# Patient Record
Sex: FEMALE | Race: WHITE | NOT HISPANIC OR LATINO | Employment: OTHER | URBAN - METROPOLITAN AREA
[De-identification: names, ages, dates, MRNs, and addresses within clinical notes are randomized per-mention and may not be internally consistent; named-entity substitution may affect disease eponyms.]

---

## 2017-12-21 ENCOUNTER — CONVERSION ENCOUNTER (OUTPATIENT)
Dept: GENERAL RADIOLOGY | Facility: HOSPITAL | Age: 72
End: 2017-12-21

## 2018-04-16 ENCOUNTER — OFFICE VISIT CONVERTED (OUTPATIENT)
Dept: FAMILY MEDICINE CLINIC | Facility: CLINIC | Age: 73
End: 2018-04-16
Attending: FAMILY MEDICINE

## 2018-07-10 ENCOUNTER — OFFICE VISIT CONVERTED (OUTPATIENT)
Dept: FAMILY MEDICINE CLINIC | Facility: CLINIC | Age: 73
End: 2018-07-10
Attending: FAMILY MEDICINE

## 2018-10-16 ENCOUNTER — CONVERSION ENCOUNTER (OUTPATIENT)
Dept: OTHER | Facility: HOSPITAL | Age: 73
End: 2018-10-16

## 2018-10-16 ENCOUNTER — OFFICE VISIT CONVERTED (OUTPATIENT)
Dept: FAMILY MEDICINE CLINIC | Facility: CLINIC | Age: 73
End: 2018-10-16
Attending: FAMILY MEDICINE

## 2019-01-02 ENCOUNTER — OFFICE VISIT CONVERTED (OUTPATIENT)
Dept: FAMILY MEDICINE CLINIC | Facility: CLINIC | Age: 74
End: 2019-01-02
Attending: NURSE PRACTITIONER

## 2019-01-14 ENCOUNTER — HOSPITAL ENCOUNTER (OUTPATIENT)
Dept: URGENT CARE | Facility: CLINIC | Age: 74
Discharge: HOME | End: 2019-01-14

## 2019-01-21 ENCOUNTER — OFFICE VISIT CONVERTED (OUTPATIENT)
Dept: FAMILY MEDICINE CLINIC | Facility: CLINIC | Age: 74
End: 2019-01-21
Attending: FAMILY MEDICINE

## 2019-01-29 ENCOUNTER — OFFICE VISIT CONVERTED (OUTPATIENT)
Dept: FAMILY MEDICINE CLINIC | Facility: CLINIC | Age: 74
End: 2019-01-29
Attending: FAMILY MEDICINE

## 2019-01-30 ENCOUNTER — HOSPITAL ENCOUNTER (OUTPATIENT)
Dept: GENERAL RADIOLOGY | Facility: HOSPITAL | Age: 74
Discharge: HOME | End: 2019-01-30
Attending: NURSE PRACTITIONER

## 2019-02-01 ENCOUNTER — HOSPITAL ENCOUNTER (OUTPATIENT)
Dept: FAMILY MEDICINE CLINIC | Facility: CLINIC | Age: 74
Discharge: HOME | End: 2019-02-01
Attending: FAMILY MEDICINE

## 2019-02-03 LAB — BACTERIA SPEC AEROBE CULT: NORMAL

## 2019-03-04 ENCOUNTER — CONVERSION ENCOUNTER (OUTPATIENT)
Dept: FAMILY MEDICINE CLINIC | Facility: CLINIC | Age: 74
End: 2019-03-04

## 2019-03-04 ENCOUNTER — OFFICE VISIT CONVERTED (OUTPATIENT)
Dept: FAMILY MEDICINE CLINIC | Facility: CLINIC | Age: 74
End: 2019-03-04
Attending: FAMILY MEDICINE

## 2019-04-01 ENCOUNTER — OFFICE VISIT CONVERTED (OUTPATIENT)
Dept: FAMILY MEDICINE CLINIC | Facility: CLINIC | Age: 74
End: 2019-04-01
Attending: FAMILY MEDICINE

## 2019-04-05 ENCOUNTER — HOSPITAL ENCOUNTER (OUTPATIENT)
Dept: FAMILY MEDICINE CLINIC | Facility: CLINIC | Age: 74
Discharge: HOME | End: 2019-04-05
Attending: FAMILY MEDICINE

## 2019-04-05 LAB
ALBUMIN SERPL-MCNC: 4.2 G/DL (ref 3.5–5)
ALBUMIN/GLOB SERPL: 1.3 {RATIO} (ref 1.4–2.6)
ALP SERPL-CCNC: 85 U/L (ref 43–160)
ALT SERPL-CCNC: 43 U/L (ref 10–40)
ANION GAP SERPL CALC-SCNC: 14 MMOL/L (ref 8–19)
AST SERPL-CCNC: 32 U/L (ref 15–50)
BILIRUB SERPL-MCNC: 0.42 MG/DL (ref 0.2–1.3)
BUN SERPL-MCNC: 12 MG/DL (ref 5–25)
BUN/CREAT SERPL: 14 {RATIO} (ref 6–20)
CALCIUM SERPL-MCNC: 9.5 MG/DL (ref 8.7–10.4)
CHLORIDE SERPL-SCNC: 98 MMOL/L (ref 99–111)
CHOLEST SERPL-MCNC: 165 MG/DL (ref 107–200)
CHOLEST/HDLC SERPL: 3.4 {RATIO} (ref 3–6)
CONV CO2: 29 MMOL/L (ref 22–32)
CONV TOTAL PROTEIN: 7.5 G/DL (ref 6.3–8.2)
CREAT UR-MCNC: 0.86 MG/DL (ref 0.5–0.9)
GFR SERPLBLD BASED ON 1.73 SQ M-ARVRAT: >60 ML/MIN/{1.73_M2}
GLOBULIN UR ELPH-MCNC: 3.3 G/DL (ref 2–3.5)
GLUCOSE SERPL-MCNC: 113 MG/DL (ref 65–99)
HDLC SERPL-MCNC: 48 MG/DL (ref 40–60)
LDLC SERPL CALC-MCNC: 94 MG/DL (ref 70–100)
OSMOLALITY SERPL CALC.SUM OF ELEC: 285 MOSM/KG (ref 273–304)
POTASSIUM SERPL-SCNC: 3.7 MMOL/L (ref 3.5–5.3)
SODIUM SERPL-SCNC: 137 MMOL/L (ref 135–147)
T4 FREE SERPL-MCNC: 1 NG/DL (ref 0.9–1.8)
TRIGL SERPL-MCNC: 115 MG/DL (ref 40–150)
TSH SERPL-ACNC: 5.21 M[IU]/L (ref 0.27–4.2)
VLDLC SERPL-MCNC: 23 MG/DL (ref 5–37)

## 2019-04-24 ENCOUNTER — CONVERSION ENCOUNTER (OUTPATIENT)
Dept: FAMILY MEDICINE CLINIC | Facility: CLINIC | Age: 74
End: 2019-04-24

## 2019-04-24 ENCOUNTER — OFFICE VISIT CONVERTED (OUTPATIENT)
Dept: FAMILY MEDICINE CLINIC | Facility: CLINIC | Age: 74
End: 2019-04-24
Attending: NURSE PRACTITIONER

## 2019-04-30 ENCOUNTER — HOSPITAL ENCOUNTER (OUTPATIENT)
Dept: MRI IMAGING | Facility: HOSPITAL | Age: 74
Discharge: HOME | End: 2019-04-30
Attending: NURSE PRACTITIONER

## 2019-05-02 ENCOUNTER — OFFICE VISIT CONVERTED (OUTPATIENT)
Dept: UROLOGY | Facility: CLINIC | Age: 74
End: 2019-05-02
Attending: UROLOGY

## 2019-05-02 ENCOUNTER — HOSPITAL ENCOUNTER (OUTPATIENT)
Dept: ULTRASOUND IMAGING | Facility: HOSPITAL | Age: 74
Discharge: HOME | End: 2019-05-02
Attending: NURSE PRACTITIONER

## 2019-05-02 ENCOUNTER — CONVERSION ENCOUNTER (OUTPATIENT)
Dept: SURGERY | Facility: CLINIC | Age: 74
End: 2019-05-02

## 2019-05-10 ENCOUNTER — HOSPITAL ENCOUNTER (OUTPATIENT)
Dept: ULTRASOUND IMAGING | Facility: HOSPITAL | Age: 74
Discharge: HOME | End: 2019-05-10

## 2019-08-01 ENCOUNTER — HOSPITAL ENCOUNTER (OUTPATIENT)
Dept: FAMILY MEDICINE CLINIC | Facility: CLINIC | Age: 74
Discharge: HOME | End: 2019-08-01
Attending: FAMILY MEDICINE

## 2019-08-01 ENCOUNTER — OFFICE VISIT CONVERTED (OUTPATIENT)
Dept: FAMILY MEDICINE CLINIC | Facility: CLINIC | Age: 74
End: 2019-08-01
Attending: FAMILY MEDICINE

## 2019-08-01 LAB
ANION GAP SERPL CALC-SCNC: 18 MMOL/L (ref 8–19)
BASOPHILS # BLD AUTO: 0.07 10*3/UL (ref 0–0.2)
BASOPHILS NFR BLD AUTO: 0.8 % (ref 0–3)
BUN SERPL-MCNC: 14 MG/DL (ref 5–25)
BUN/CREAT SERPL: 17 {RATIO} (ref 6–20)
CALCIUM SERPL-MCNC: 9.9 MG/DL (ref 8.7–10.4)
CHLORIDE SERPL-SCNC: 99 MMOL/L (ref 99–111)
CONV ABS IMM GRAN: 0.04 10*3/UL (ref 0–0.2)
CONV CO2: 27 MMOL/L (ref 22–32)
CONV IMMATURE GRAN: 0.5 % (ref 0–1.8)
CREAT UR-MCNC: 0.81 MG/DL (ref 0.5–0.9)
DEPRECATED RDW RBC AUTO: 45.1 FL (ref 36.4–46.3)
EOSINOPHIL # BLD AUTO: 0.27 10*3/UL (ref 0–0.7)
EOSINOPHIL # BLD AUTO: 3.2 % (ref 0–7)
ERYTHROCYTE [DISTWIDTH] IN BLOOD BY AUTOMATED COUNT: 13.2 % (ref 11.7–14.4)
GFR SERPLBLD BASED ON 1.73 SQ M-ARVRAT: >60 ML/MIN/{1.73_M2}
GLUCOSE SERPL-MCNC: 83 MG/DL (ref 65–99)
HBA1C MFR BLD: 13.7 G/DL (ref 12–16)
HCT VFR BLD AUTO: 42.4 % (ref 37–47)
LYMPHOCYTES # BLD AUTO: 1.92 10*3/UL (ref 1–5)
MCH RBC QN AUTO: 30 PG (ref 27–31)
MCHC RBC AUTO-ENTMCNC: 32.3 G/DL (ref 33–37)
MCV RBC AUTO: 93 FL (ref 81–99)
MONOCYTES # BLD AUTO: 0.68 10*3/UL (ref 0.2–1.2)
MONOCYTES NFR BLD AUTO: 8.1 % (ref 3–10)
NEUTROPHILS # BLD AUTO: 5.38 10*3/UL (ref 2–8)
NEUTROPHILS NFR BLD AUTO: 64.4 % (ref 30–85)
NRBC CBCN: 0 % (ref 0–0.7)
OSMOLALITY SERPL CALC.SUM OF ELEC: 290 MOSM/KG (ref 273–304)
PLATELET # BLD AUTO: 326 10*3/UL (ref 130–400)
PMV BLD AUTO: 10.4 FL (ref 9.4–12.3)
POTASSIUM SERPL-SCNC: 3.8 MMOL/L (ref 3.5–5.3)
RBC # BLD AUTO: 4.56 10*6/UL (ref 4.2–5.4)
SODIUM SERPL-SCNC: 140 MMOL/L (ref 135–147)
T4 FREE SERPL-MCNC: 1.2 NG/DL (ref 0.9–1.8)
TSH SERPL-ACNC: 4.15 M[IU]/L (ref 0.27–4.2)
VARIANT LYMPHS NFR BLD MANUAL: 23 % (ref 20–45)
WBC # BLD AUTO: 8.36 10*3/UL (ref 4.8–10.8)

## 2019-08-08 ENCOUNTER — HOSPITAL ENCOUNTER (OUTPATIENT)
Dept: ONCOLOGY | Facility: HOSPITAL | Age: 74
Discharge: HOME | End: 2019-08-08
Attending: INTERNAL MEDICINE

## 2019-08-08 ENCOUNTER — OFFICE VISIT CONVERTED (OUTPATIENT)
Dept: ONCOLOGY | Facility: HOSPITAL | Age: 74
End: 2019-08-08
Attending: INTERNAL MEDICINE

## 2019-08-14 ENCOUNTER — OFFICE VISIT CONVERTED (OUTPATIENT)
Dept: SURGERY | Facility: CLINIC | Age: 74
End: 2019-08-14
Attending: SURGERY

## 2019-08-20 ENCOUNTER — HOSPITAL ENCOUNTER (OUTPATIENT)
Dept: PERIOP | Facility: HOSPITAL | Age: 74
Setting detail: HOSPITAL OUTPATIENT SURGERY
Discharge: HOME | End: 2019-08-20
Attending: SURGERY

## 2019-08-20 LAB
ANION GAP SERPL CALC-SCNC: 19 MMOL/L (ref 8–19)
BUN SERPL-MCNC: 15 MG/DL (ref 5–25)
BUN/CREAT SERPL: 18 {RATIO} (ref 6–20)
CALCIUM SERPL-MCNC: 9.6 MG/DL (ref 8.7–10.4)
CHLORIDE SERPL-SCNC: 101 MMOL/L (ref 99–111)
CONV CO2: 25 MMOL/L (ref 22–32)
CREAT UR-MCNC: 0.85 MG/DL (ref 0.5–0.9)
GFR SERPLBLD BASED ON 1.73 SQ M-ARVRAT: >60 ML/MIN/{1.73_M2}
GLUCOSE SERPL-MCNC: 138 MG/DL (ref 65–99)
OSMOLALITY SERPL CALC.SUM OF ELEC: 295 MOSM/KG (ref 273–304)
POTASSIUM SERPL-SCNC: 3.5 MMOL/L (ref 3.5–5.3)
SODIUM SERPL-SCNC: 141 MMOL/L (ref 135–147)

## 2019-08-28 ENCOUNTER — OFFICE VISIT CONVERTED (OUTPATIENT)
Dept: SURGERY | Facility: CLINIC | Age: 74
End: 2019-08-28
Attending: SURGERY

## 2019-09-02 ENCOUNTER — HOSPITAL ENCOUNTER (OUTPATIENT)
Dept: URGENT CARE | Facility: CLINIC | Age: 74
Discharge: HOME | End: 2019-09-02
Attending: FAMILY MEDICINE

## 2019-09-04 ENCOUNTER — OFFICE VISIT CONVERTED (OUTPATIENT)
Dept: SURGERY | Facility: CLINIC | Age: 74
End: 2019-09-04
Attending: SURGERY

## 2019-09-05 LAB
BACTERIA SPEC AEROBE CULT: ABNORMAL
CIPROFLOXACIN SUSC ISLT: >=8
CLINDAMYCIN SUSC ISLT: 0.25
DAPTOMYCIN SUSC ISLT: 0.5
DOXYCYCLINE SUSC ISLT: <=0.5
ERYTHROMYCIN SUSC ISLT: >=8
GENTAMICIN SUSC ISLT: >=16
LEVOFLOXACIN SUSC ISLT: 4
OXACILLIN SUSC ISLT: >=4
RIFAMPIN SUSC ISLT: <=0.5
TETRACYCLINE SUSC ISLT: <=1
TMP SMX SUSC ISLT: <=10
VANCOMYCIN SUSC ISLT: 1

## 2019-09-11 ENCOUNTER — OFFICE VISIT CONVERTED (OUTPATIENT)
Dept: SURGERY | Facility: CLINIC | Age: 74
End: 2019-09-11
Attending: SURGERY

## 2019-09-25 ENCOUNTER — OFFICE VISIT CONVERTED (OUTPATIENT)
Dept: SURGERY | Facility: CLINIC | Age: 74
End: 2019-09-25
Attending: NURSE PRACTITIONER

## 2019-10-01 ENCOUNTER — OFFICE VISIT CONVERTED (OUTPATIENT)
Dept: FAMILY MEDICINE CLINIC | Facility: CLINIC | Age: 74
End: 2019-10-01
Attending: FAMILY MEDICINE

## 2019-10-09 ENCOUNTER — OFFICE VISIT CONVERTED (OUTPATIENT)
Dept: SURGERY | Facility: CLINIC | Age: 74
End: 2019-10-09
Attending: NURSE PRACTITIONER

## 2019-12-09 ENCOUNTER — OFFICE VISIT CONVERTED (OUTPATIENT)
Dept: SURGERY | Facility: CLINIC | Age: 74
End: 2019-12-09
Attending: SURGERY

## 2020-02-03 ENCOUNTER — HOSPITAL ENCOUNTER (OUTPATIENT)
Dept: FAMILY MEDICINE CLINIC | Facility: CLINIC | Age: 75
Discharge: HOME | End: 2020-02-03
Attending: FAMILY MEDICINE

## 2020-02-03 ENCOUNTER — OFFICE VISIT CONVERTED (OUTPATIENT)
Dept: FAMILY MEDICINE CLINIC | Facility: CLINIC | Age: 75
End: 2020-02-03
Attending: FAMILY MEDICINE

## 2020-02-03 ENCOUNTER — HOSPITAL ENCOUNTER (OUTPATIENT)
Dept: GENERAL RADIOLOGY | Facility: HOSPITAL | Age: 75
Discharge: HOME | End: 2020-02-03
Attending: INTERNAL MEDICINE

## 2020-02-03 LAB
ALBUMIN SERPL-MCNC: 4.1 G/DL (ref 3.5–5)
ALBUMIN/GLOB SERPL: 1.3 {RATIO} (ref 1.4–2.6)
ALP SERPL-CCNC: 77 U/L (ref 43–160)
ALT SERPL-CCNC: 57 U/L (ref 10–40)
ANION GAP SERPL CALC-SCNC: 16 MMOL/L (ref 8–19)
AST SERPL-CCNC: 46 U/L (ref 15–50)
BASOPHILS # BLD AUTO: 0.06 10*3/UL (ref 0–0.2)
BASOPHILS NFR BLD AUTO: 0.6 % (ref 0–3)
BILIRUB SERPL-MCNC: 0.41 MG/DL (ref 0.2–1.3)
BUN SERPL-MCNC: 15 MG/DL (ref 5–25)
BUN/CREAT SERPL: 16 {RATIO} (ref 6–20)
CALCIUM SERPL-MCNC: 9.8 MG/DL (ref 8.7–10.4)
CHLORIDE SERPL-SCNC: 99 MMOL/L (ref 99–111)
CHOLEST SERPL-MCNC: 164 MG/DL (ref 107–200)
CHOLEST/HDLC SERPL: 4.2 {RATIO} (ref 3–6)
CONV ABS IMM GRAN: 0.05 10*3/UL (ref 0–0.2)
CONV CO2: 27 MMOL/L (ref 22–32)
CONV IMMATURE GRAN: 0.5 % (ref 0–1.8)
CONV TOTAL PROTEIN: 7.3 G/DL (ref 6.3–8.2)
CREAT UR-MCNC: 0.95 MG/DL (ref 0.5–0.9)
DEPRECATED RDW RBC AUTO: 43.3 FL (ref 36.4–46.3)
EOSINOPHIL # BLD AUTO: 0.2 10*3/UL (ref 0–0.7)
EOSINOPHIL # BLD AUTO: 2 % (ref 0–7)
ERYTHROCYTE [DISTWIDTH] IN BLOOD BY AUTOMATED COUNT: 13.2 % (ref 11.7–14.4)
GFR SERPLBLD BASED ON 1.73 SQ M-ARVRAT: 58 ML/MIN/{1.73_M2}
GLOBULIN UR ELPH-MCNC: 3.2 G/DL (ref 2–3.5)
GLUCOSE SERPL-MCNC: 89 MG/DL (ref 65–99)
HCT VFR BLD AUTO: 40.4 % (ref 37–47)
HDLC SERPL-MCNC: 39 MG/DL (ref 40–60)
HGB BLD-MCNC: 13.6 G/DL (ref 12–16)
LDLC SERPL CALC-MCNC: 107 MG/DL (ref 70–100)
LYMPHOCYTES # BLD AUTO: 2.64 10*3/UL (ref 1–5)
LYMPHOCYTES NFR BLD AUTO: 27 % (ref 20–45)
MCH RBC QN AUTO: 30.4 PG (ref 27–31)
MCHC RBC AUTO-ENTMCNC: 33.7 G/DL (ref 33–37)
MCV RBC AUTO: 90.4 FL (ref 81–99)
MONOCYTES # BLD AUTO: 0.79 10*3/UL (ref 0.2–1.2)
MONOCYTES NFR BLD AUTO: 8.1 % (ref 3–10)
NEUTROPHILS # BLD AUTO: 6.03 10*3/UL (ref 2–8)
NEUTROPHILS NFR BLD AUTO: 61.8 % (ref 30–85)
NRBC CBCN: 0 % (ref 0–0.7)
OSMOLALITY SERPL CALC.SUM OF ELEC: 288 MOSM/KG (ref 273–304)
PLATELET # BLD AUTO: 336 10*3/UL (ref 130–400)
PMV BLD AUTO: 10.5 FL (ref 9.4–12.3)
POTASSIUM SERPL-SCNC: 3.2 MMOL/L (ref 3.5–5.3)
RBC # BLD AUTO: 4.47 10*6/UL (ref 4.2–5.4)
SODIUM SERPL-SCNC: 139 MMOL/L (ref 135–147)
T4 FREE SERPL-MCNC: 1.2 NG/DL (ref 0.9–1.8)
TRIGL SERPL-MCNC: 92 MG/DL (ref 40–150)
TSH SERPL-ACNC: 3.62 M[IU]/L (ref 0.27–4.2)
VLDLC SERPL-MCNC: 18 MG/DL (ref 5–37)
WBC # BLD AUTO: 9.77 10*3/UL (ref 4.8–10.8)

## 2020-02-06 ENCOUNTER — HOSPITAL ENCOUNTER (OUTPATIENT)
Dept: GENERAL RADIOLOGY | Facility: HOSPITAL | Age: 75
Discharge: HOME | End: 2020-02-06
Attending: FAMILY MEDICINE

## 2020-02-10 ENCOUNTER — HOSPITAL ENCOUNTER (OUTPATIENT)
Dept: FAMILY MEDICINE CLINIC | Facility: CLINIC | Age: 75
Discharge: HOME | End: 2020-02-10
Attending: FAMILY MEDICINE

## 2020-02-10 LAB
ANION GAP SERPL CALC-SCNC: 15 MMOL/L (ref 8–19)
BUN SERPL-MCNC: 13 MG/DL (ref 5–25)
BUN/CREAT SERPL: 16 {RATIO} (ref 6–20)
CALCIUM SERPL-MCNC: 9.2 MG/DL (ref 8.7–10.4)
CHLORIDE SERPL-SCNC: 93 MMOL/L (ref 99–111)
CONV CO2: 27 MMOL/L (ref 22–32)
CREAT UR-MCNC: 0.82 MG/DL (ref 0.5–0.9)
GFR SERPLBLD BASED ON 1.73 SQ M-ARVRAT: >60 ML/MIN/{1.73_M2}
GLUCOSE SERPL-MCNC: 93 MG/DL (ref 65–99)
OSMOLALITY SERPL CALC.SUM OF ELEC: 274 MOSM/KG (ref 273–304)
POTASSIUM SERPL-SCNC: 3.2 MMOL/L (ref 3.5–5.3)
SODIUM SERPL-SCNC: 132 MMOL/L (ref 135–147)

## 2020-02-13 ENCOUNTER — HOSPITAL ENCOUNTER (OUTPATIENT)
Dept: URGENT CARE | Facility: CLINIC | Age: 75
Discharge: HOME | End: 2020-02-13

## 2020-02-15 LAB — BACTERIA SPEC AEROBE CULT: NORMAL

## 2020-02-17 ENCOUNTER — OFFICE VISIT CONVERTED (OUTPATIENT)
Dept: FAMILY MEDICINE CLINIC | Facility: CLINIC | Age: 75
End: 2020-02-17
Attending: FAMILY MEDICINE

## 2020-02-17 ENCOUNTER — HOSPITAL ENCOUNTER (OUTPATIENT)
Dept: SURGERY | Facility: CLINIC | Age: 75
Discharge: HOME | End: 2020-02-17
Attending: FAMILY MEDICINE

## 2020-02-17 LAB
ANION GAP SERPL CALC-SCNC: 17 MMOL/L (ref 8–19)
BUN SERPL-MCNC: 10 MG/DL (ref 5–25)
BUN/CREAT SERPL: 11 {RATIO} (ref 6–20)
CALCIUM SERPL-MCNC: 9.2 MG/DL (ref 8.7–10.4)
CHLORIDE SERPL-SCNC: 90 MMOL/L (ref 99–111)
CONV CO2: 26 MMOL/L (ref 22–32)
CREAT UR-MCNC: 0.87 MG/DL (ref 0.5–0.9)
GFR SERPLBLD BASED ON 1.73 SQ M-ARVRAT: >60 ML/MIN/{1.73_M2}
GLUCOSE SERPL-MCNC: 94 MG/DL (ref 65–99)
OSMOLALITY SERPL CALC.SUM OF ELEC: 269 MOSM/KG (ref 273–304)
POTASSIUM SERPL-SCNC: 3.4 MMOL/L (ref 3.5–5.3)
SODIUM SERPL-SCNC: 130 MMOL/L (ref 135–147)

## 2020-02-24 ENCOUNTER — HOSPITAL ENCOUNTER (OUTPATIENT)
Dept: FAMILY MEDICINE CLINIC | Facility: CLINIC | Age: 75
Discharge: HOME | End: 2020-02-24
Attending: FAMILY MEDICINE

## 2020-02-24 LAB
ANION GAP SERPL CALC-SCNC: 14 MMOL/L (ref 8–19)
BUN SERPL-MCNC: 13 MG/DL (ref 5–25)
BUN/CREAT SERPL: 15 {RATIO} (ref 6–20)
CALCIUM SERPL-MCNC: 9.3 MG/DL (ref 8.7–10.4)
CHLORIDE SERPL-SCNC: 102 MMOL/L (ref 99–111)
CONV CO2: 25 MMOL/L (ref 22–32)
CREAT UR-MCNC: 0.84 MG/DL (ref 0.5–0.9)
GFR SERPLBLD BASED ON 1.73 SQ M-ARVRAT: >60 ML/MIN/{1.73_M2}
GLUCOSE SERPL-MCNC: 92 MG/DL (ref 65–99)
OSMOLALITY SERPL CALC.SUM OF ELEC: 284 MOSM/KG (ref 273–304)
POTASSIUM SERPL-SCNC: 4.3 MMOL/L (ref 3.5–5.3)
SODIUM SERPL-SCNC: 137 MMOL/L (ref 135–147)

## 2020-03-13 ENCOUNTER — OFFICE VISIT CONVERTED (OUTPATIENT)
Dept: FAMILY MEDICINE CLINIC | Facility: CLINIC | Age: 75
End: 2020-03-13
Attending: FAMILY MEDICINE

## 2020-03-13 ENCOUNTER — CONVERSION ENCOUNTER (OUTPATIENT)
Dept: FAMILY MEDICINE CLINIC | Facility: CLINIC | Age: 75
End: 2020-03-13

## 2020-03-31 ENCOUNTER — HOSPITAL ENCOUNTER (OUTPATIENT)
Dept: FAMILY MEDICINE CLINIC | Facility: CLINIC | Age: 75
Discharge: HOME | End: 2020-03-31
Attending: FAMILY MEDICINE

## 2020-03-31 ENCOUNTER — OFFICE VISIT CONVERTED (OUTPATIENT)
Dept: FAMILY MEDICINE CLINIC | Facility: CLINIC | Age: 75
End: 2020-03-31
Attending: FAMILY MEDICINE

## 2020-03-31 LAB
ANION GAP SERPL CALC-SCNC: 17 MMOL/L (ref 8–19)
BUN SERPL-MCNC: 10 MG/DL (ref 5–25)
BUN/CREAT SERPL: 13 {RATIO} (ref 6–20)
CALCIUM SERPL-MCNC: 9.6 MG/DL (ref 8.7–10.4)
CHLORIDE SERPL-SCNC: 97 MMOL/L (ref 99–111)
CONV CO2: 27 MMOL/L (ref 22–32)
CREAT UR-MCNC: 0.79 MG/DL (ref 0.5–0.9)
GFR SERPLBLD BASED ON 1.73 SQ M-ARVRAT: >60 ML/MIN/{1.73_M2}
GLUCOSE SERPL-MCNC: 117 MG/DL (ref 65–99)
OSMOLALITY SERPL CALC.SUM OF ELEC: 286 MOSM/KG (ref 273–304)
POTASSIUM SERPL-SCNC: 3.3 MMOL/L (ref 3.5–5.3)
SODIUM SERPL-SCNC: 138 MMOL/L (ref 135–147)

## 2020-04-02 LAB
AMPICILLIN SUSC ISLT: <=2
BACTERIA UR CULT: ABNORMAL
CIPROFLOXACIN SUSC ISLT: 1
CONV GENTAMICIN HIGH LEVEL SYNERGY: ABNORMAL
CONV STREPTOMYCIN HIGH LEVEL SYNERGY: ABNORMAL
DAPTOMYCIN SUSC ISLT: 2
DOXYCYCLINE SUSC ISLT: >=16
ERYTHROMYCIN SUSC ISLT: >=8
LEVOFLOXACIN SUSC ISLT: 2
LINEZOLID SUSC ISLT: 2
NITROFURANTOIN SUSC ISLT: <=16
TETRACYCLINE SUSC ISLT: >=16
VANCOMYCIN SUSC ISLT: 2

## 2020-04-14 ENCOUNTER — HOSPITAL ENCOUNTER (OUTPATIENT)
Dept: LAB | Facility: HOSPITAL | Age: 75
Discharge: HOME | End: 2020-04-14
Attending: FAMILY MEDICINE

## 2020-04-14 ENCOUNTER — TELEPHONE CONVERTED (OUTPATIENT)
Dept: FAMILY MEDICINE CLINIC | Facility: CLINIC | Age: 75
End: 2020-04-14
Attending: FAMILY MEDICINE

## 2020-04-14 LAB
ALBUMIN SERPL-MCNC: 4.1 G/DL (ref 3.5–5)
ALBUMIN/GLOB SERPL: 1.2 {RATIO} (ref 1.4–2.6)
ALP SERPL-CCNC: 78 U/L (ref 43–160)
ALT SERPL-CCNC: 45 U/L (ref 10–40)
AMYLASE SERPL-CCNC: 37 U/L (ref 30–150)
ANION GAP SERPL CALC-SCNC: 18 MMOL/L (ref 8–19)
APPEARANCE UR: ABNORMAL
AST SERPL-CCNC: 29 U/L (ref 15–50)
BASOPHILS # BLD AUTO: 0.04 10*3/UL (ref 0–0.2)
BASOPHILS NFR BLD AUTO: 0.4 % (ref 0–3)
BILIRUB SERPL-MCNC: 0.81 MG/DL (ref 0.2–1.3)
BILIRUB UR QL: NEGATIVE
BUN SERPL-MCNC: 8 MG/DL (ref 5–25)
BUN/CREAT SERPL: 10 {RATIO} (ref 6–20)
CALCIUM SERPL-MCNC: 9.6 MG/DL (ref 8.7–10.4)
CHLORIDE SERPL-SCNC: 89 MMOL/L (ref 99–111)
COLOR UR: YELLOW
CONV ABS IMM GRAN: 0.05 10*3/UL (ref 0–0.2)
CONV BACTERIA: NEGATIVE
CONV CO2: 25 MMOL/L (ref 22–32)
CONV COLLECTION SOURCE (UA): ABNORMAL
CONV IMMATURE GRAN: 0.5 % (ref 0–1.8)
CONV TOTAL PROTEIN: 7.6 G/DL (ref 6.3–8.2)
CONV UROBILINOGEN IN URINE BY AUTOMATED TEST STRIP: 0.2 {EHRLICHU}/DL (ref 0.1–1)
CREAT UR-MCNC: 0.79 MG/DL (ref 0.5–0.9)
DEPRECATED RDW RBC AUTO: 38.4 FL (ref 36.4–46.3)
EOSINOPHIL # BLD AUTO: 0.12 10*3/UL (ref 0–0.7)
EOSINOPHIL # BLD AUTO: 1.1 % (ref 0–7)
ERYTHROCYTE [DISTWIDTH] IN BLOOD BY AUTOMATED COUNT: 12.1 % (ref 11.7–14.4)
GFR SERPLBLD BASED ON 1.73 SQ M-ARVRAT: >60 ML/MIN/{1.73_M2}
GLOBULIN UR ELPH-MCNC: 3.5 G/DL (ref 2–3.5)
GLUCOSE SERPL-MCNC: 122 MG/DL (ref 65–99)
GLUCOSE UR QL: NEGATIVE MG/DL
HCT VFR BLD AUTO: 42.2 % (ref 37–47)
HGB BLD-MCNC: 14.9 G/DL (ref 12–16)
HGB UR QL STRIP: ABNORMAL
KETONES UR QL STRIP: ABNORMAL MG/DL
LEUKOCYTE ESTERASE UR QL STRIP: NEGATIVE
LIPASE SERPL-CCNC: 26 U/L (ref 5–51)
LYMPHOCYTES # BLD AUTO: 1.59 10*3/UL (ref 1–5)
LYMPHOCYTES NFR BLD AUTO: 15.2 % (ref 20–45)
MCH RBC QN AUTO: 30.4 PG (ref 27–31)
MCHC RBC AUTO-ENTMCNC: 35.3 G/DL (ref 33–37)
MCV RBC AUTO: 86.1 FL (ref 81–99)
MONOCYTES # BLD AUTO: 0.66 10*3/UL (ref 0.2–1.2)
MONOCYTES NFR BLD AUTO: 6.3 % (ref 3–10)
NEUTROPHILS # BLD AUTO: 8.03 10*3/UL (ref 2–8)
NEUTROPHILS NFR BLD AUTO: 76.5 % (ref 30–85)
NITRITE UR QL STRIP: NEGATIVE
NRBC CBCN: 0 % (ref 0–0.7)
OSMOLALITY SERPL CALC.SUM OF ELEC: 268 MOSM/KG (ref 273–304)
PH UR STRIP.AUTO: 7.5 [PH] (ref 5–8)
PLATELET # BLD AUTO: 360 10*3/UL (ref 130–400)
PMV BLD AUTO: 10.1 FL (ref 9.4–12.3)
POTASSIUM SERPL-SCNC: 3 MMOL/L (ref 3.5–5.3)
PROT UR QL: NEGATIVE MG/DL
RBC # BLD AUTO: 4.9 10*6/UL (ref 4.2–5.4)
RBC #/AREA URNS HPF: ABNORMAL /[HPF]
SODIUM SERPL-SCNC: 129 MMOL/L (ref 135–147)
SP GR UR: 1.01 (ref 1–1.03)
WBC # BLD AUTO: 10.49 10*3/UL (ref 4.8–10.8)
WBC #/AREA URNS HPF: ABNORMAL /[HPF]

## 2020-04-16 LAB — BACTERIA UR CULT: NORMAL

## 2020-04-21 ENCOUNTER — HOSPITAL ENCOUNTER (OUTPATIENT)
Dept: LAB | Facility: HOSPITAL | Age: 75
Discharge: HOME | End: 2020-04-21
Attending: FAMILY MEDICINE

## 2020-04-21 LAB
ANION GAP SERPL CALC-SCNC: 16 MMOL/L (ref 8–19)
BUN SERPL-MCNC: 10 MG/DL (ref 5–25)
BUN/CREAT SERPL: 12 {RATIO} (ref 6–20)
CALCIUM SERPL-MCNC: 9.2 MG/DL (ref 8.7–10.4)
CHLORIDE SERPL-SCNC: 95 MMOL/L (ref 99–111)
CONV CO2: 26 MMOL/L (ref 22–32)
CREAT UR-MCNC: 0.86 MG/DL (ref 0.5–0.9)
GFR SERPLBLD BASED ON 1.73 SQ M-ARVRAT: >60 ML/MIN/{1.73_M2}
GLUCOSE SERPL-MCNC: 74 MG/DL (ref 65–99)
OSMOLALITY SERPL CALC.SUM OF ELEC: 274 MOSM/KG (ref 273–304)
POTASSIUM SERPL-SCNC: 3.5 MMOL/L (ref 3.5–5.3)
SODIUM SERPL-SCNC: 133 MMOL/L (ref 135–147)

## 2020-05-05 ENCOUNTER — CONVERSION ENCOUNTER (OUTPATIENT)
Dept: FAMILY MEDICINE CLINIC | Facility: CLINIC | Age: 75
End: 2020-05-05

## 2020-05-05 ENCOUNTER — OFFICE VISIT CONVERTED (OUTPATIENT)
Dept: FAMILY MEDICINE CLINIC | Facility: CLINIC | Age: 75
End: 2020-05-05
Attending: FAMILY MEDICINE

## 2020-06-01 ENCOUNTER — TELEPHONE CONVERTED (OUTPATIENT)
Dept: UROLOGY | Facility: CLINIC | Age: 75
End: 2020-06-01
Attending: UROLOGY

## 2020-06-02 ENCOUNTER — CONVERSION ENCOUNTER (OUTPATIENT)
Dept: GASTROENTEROLOGY | Facility: CLINIC | Age: 75
End: 2020-06-02
Attending: INTERNAL MEDICINE

## 2020-06-09 ENCOUNTER — CONVERSION ENCOUNTER (OUTPATIENT)
Dept: FAMILY MEDICINE CLINIC | Facility: CLINIC | Age: 75
End: 2020-06-09

## 2020-06-09 ENCOUNTER — OFFICE VISIT CONVERTED (OUTPATIENT)
Dept: FAMILY MEDICINE CLINIC | Facility: CLINIC | Age: 75
End: 2020-06-09
Attending: FAMILY MEDICINE

## 2020-07-07 ENCOUNTER — HOSPITAL ENCOUNTER (OUTPATIENT)
Dept: NUCLEAR MEDICINE | Facility: HOSPITAL | Age: 75
Discharge: HOME | End: 2020-07-07
Attending: FAMILY MEDICINE

## 2020-07-10 ENCOUNTER — TELEPHONE CONVERTED (OUTPATIENT)
Dept: FAMILY MEDICINE CLINIC | Facility: CLINIC | Age: 75
End: 2020-07-10
Attending: FAMILY MEDICINE

## 2020-07-31 ENCOUNTER — HOSPITAL ENCOUNTER (OUTPATIENT)
Dept: PREADMISSION TESTING | Facility: HOSPITAL | Age: 75
Discharge: HOME | End: 2020-07-31
Attending: INTERNAL MEDICINE

## 2020-08-02 LAB — SARS-COV-2 RNA SPEC QL NAA+PROBE: NOT DETECTED

## 2020-08-03 ENCOUNTER — OFFICE VISIT CONVERTED (OUTPATIENT)
Dept: FAMILY MEDICINE CLINIC | Facility: CLINIC | Age: 75
End: 2020-08-03
Attending: FAMILY MEDICINE

## 2020-08-03 ENCOUNTER — CONVERSION ENCOUNTER (OUTPATIENT)
Dept: FAMILY MEDICINE CLINIC | Facility: CLINIC | Age: 75
End: 2020-08-03

## 2020-08-04 ENCOUNTER — HOSPITAL ENCOUNTER (OUTPATIENT)
Dept: GASTROENTEROLOGY | Facility: HOSPITAL | Age: 75
Setting detail: HOSPITAL OUTPATIENT SURGERY
Discharge: HOME | End: 2020-08-04
Attending: INTERNAL MEDICINE

## 2020-08-06 ENCOUNTER — OFFICE VISIT CONVERTED (OUTPATIENT)
Dept: ONCOLOGY | Facility: HOSPITAL | Age: 75
End: 2020-08-06
Attending: INTERNAL MEDICINE

## 2020-08-06 ENCOUNTER — HOSPITAL ENCOUNTER (OUTPATIENT)
Dept: ONCOLOGY | Facility: HOSPITAL | Age: 75
Discharge: HOME | End: 2020-08-06
Attending: INTERNAL MEDICINE

## 2020-09-15 ENCOUNTER — OFFICE VISIT CONVERTED (OUTPATIENT)
Dept: GASTROENTEROLOGY | Facility: CLINIC | Age: 75
End: 2020-09-15
Attending: INTERNAL MEDICINE

## 2020-09-17 ENCOUNTER — OFFICE VISIT CONVERTED (OUTPATIENT)
Dept: FAMILY MEDICINE CLINIC | Facility: CLINIC | Age: 75
End: 2020-09-17
Attending: FAMILY MEDICINE

## 2020-09-19 ENCOUNTER — HOSPITAL ENCOUNTER (OUTPATIENT)
Dept: URGENT CARE | Facility: CLINIC | Age: 75
Discharge: HOME | End: 2020-09-19
Attending: FAMILY MEDICINE

## 2020-10-06 ENCOUNTER — OFFICE VISIT CONVERTED (OUTPATIENT)
Dept: FAMILY MEDICINE CLINIC | Facility: CLINIC | Age: 75
End: 2020-10-06
Attending: FAMILY MEDICINE

## 2020-10-06 ENCOUNTER — CONVERSION ENCOUNTER (OUTPATIENT)
Dept: FAMILY MEDICINE CLINIC | Facility: CLINIC | Age: 75
End: 2020-10-06

## 2020-10-06 ENCOUNTER — HOSPITAL ENCOUNTER (OUTPATIENT)
Dept: LAB | Facility: HOSPITAL | Age: 75
Discharge: HOME | End: 2020-10-06
Attending: FAMILY MEDICINE

## 2020-10-06 LAB
ALBUMIN SERPL-MCNC: 4.2 G/DL (ref 3.5–5)
ALBUMIN/GLOB SERPL: 1.4 {RATIO} (ref 1.4–2.6)
ALP SERPL-CCNC: 78 U/L (ref 43–160)
ALT SERPL-CCNC: 18 U/L (ref 10–40)
ANION GAP SERPL CALC-SCNC: 16 MMOL/L (ref 8–19)
AST SERPL-CCNC: 22 U/L (ref 15–50)
BILIRUB SERPL-MCNC: 0.44 MG/DL (ref 0.2–1.3)
BUN SERPL-MCNC: 13 MG/DL (ref 5–25)
BUN/CREAT SERPL: 13 {RATIO} (ref 6–20)
CALCIUM SERPL-MCNC: 9.7 MG/DL (ref 8.7–10.4)
CHLORIDE SERPL-SCNC: 103 MMOL/L (ref 99–111)
CHOLEST SERPL-MCNC: 158 MG/DL (ref 107–200)
CHOLEST/HDLC SERPL: 3.6 {RATIO} (ref 3–6)
CONV CO2: 25 MMOL/L (ref 22–32)
CONV TOTAL PROTEIN: 7.2 G/DL (ref 6.3–8.2)
CREAT UR-MCNC: 0.97 MG/DL (ref 0.5–0.9)
GFR SERPLBLD BASED ON 1.73 SQ M-ARVRAT: 57 ML/MIN/{1.73_M2}
GLOBULIN UR ELPH-MCNC: 3 G/DL (ref 2–3.5)
GLUCOSE SERPL-MCNC: 100 MG/DL (ref 65–99)
HDLC SERPL-MCNC: 44 MG/DL (ref 40–60)
LDLC SERPL CALC-MCNC: 91 MG/DL (ref 70–100)
OSMOLALITY SERPL CALC.SUM OF ELEC: 290 MOSM/KG (ref 273–304)
POTASSIUM SERPL-SCNC: 4.2 MMOL/L (ref 3.5–5.3)
SODIUM SERPL-SCNC: 140 MMOL/L (ref 135–147)
T4 FREE SERPL-MCNC: 1.1 NG/DL (ref 0.9–1.8)
TRIGL SERPL-MCNC: 114 MG/DL (ref 40–150)
TSH SERPL-ACNC: 3.93 M[IU]/L (ref 0.27–4.2)
VLDLC SERPL-MCNC: 23 MG/DL (ref 5–37)

## 2020-10-16 ENCOUNTER — HOSPITAL ENCOUNTER (OUTPATIENT)
Dept: PHYSICAL THERAPY | Facility: CLINIC | Age: 75
Setting detail: RECURRING SERIES
Discharge: HOME | End: 2020-10-20
Attending: INTERNAL MEDICINE

## 2020-11-09 ENCOUNTER — HOSPITAL ENCOUNTER (OUTPATIENT)
Dept: URGENT CARE | Facility: CLINIC | Age: 75
Discharge: HOME | End: 2020-11-09

## 2020-11-11 LAB — BACTERIA UR CULT: NORMAL

## 2020-11-12 LAB — SARS-COV-2 RNA SPEC QL NAA+PROBE: DETECTED

## 2021-02-01 ENCOUNTER — HOSPITAL ENCOUNTER (OUTPATIENT)
Dept: URGENT CARE | Facility: CLINIC | Age: 76
Discharge: HOME | End: 2021-02-01
Attending: PHYSICIAN ASSISTANT

## 2021-02-04 ENCOUNTER — HOSPITAL ENCOUNTER (OUTPATIENT)
Dept: GENERAL RADIOLOGY | Facility: HOSPITAL | Age: 76
Discharge: HOME | End: 2021-02-04
Attending: INTERNAL MEDICINE

## 2021-02-08 ENCOUNTER — HOSPITAL ENCOUNTER (OUTPATIENT)
Dept: GASTROENTEROLOGY | Facility: HOSPITAL | Age: 76
Setting detail: HOSPITAL OUTPATIENT SURGERY
Discharge: HOME | End: 2021-02-08
Attending: INTERNAL MEDICINE

## 2021-02-12 ENCOUNTER — HOSPITAL ENCOUNTER (OUTPATIENT)
Dept: VACCINE CLINIC | Facility: HOSPITAL | Age: 76
Discharge: HOME | End: 2021-02-12
Attending: INTERNAL MEDICINE

## 2021-02-19 ENCOUNTER — OFFICE VISIT CONVERTED (OUTPATIENT)
Dept: FAMILY MEDICINE CLINIC | Facility: CLINIC | Age: 76
End: 2021-02-19
Attending: FAMILY MEDICINE

## 2021-03-12 ENCOUNTER — HOSPITAL ENCOUNTER (OUTPATIENT)
Dept: VACCINE CLINIC | Facility: HOSPITAL | Age: 76
Discharge: HOME | End: 2021-03-12
Attending: INTERNAL MEDICINE

## 2021-04-01 ENCOUNTER — OFFICE VISIT CONVERTED (OUTPATIENT)
Dept: ONCOLOGY | Facility: HOSPITAL | Age: 76
End: 2021-04-01
Attending: INTERNAL MEDICINE

## 2021-04-01 ENCOUNTER — HOSPITAL ENCOUNTER (OUTPATIENT)
Dept: ONCOLOGY | Facility: HOSPITAL | Age: 76
Discharge: HOME | End: 2021-04-01
Attending: INTERNAL MEDICINE

## 2021-05-10 NOTE — H&P
History and Physical      Patient Name: Alina Velasquez   Patient ID: [id number]   Sex: Female   Birthdate: Lacy 3, 1945    Primary Care Provider: Magnus Lima DO   Referring Provider: Eileen Gutierrez MD    Visit Date: June 2, 2020    Provider: Nelson Almonte MD   Location: [facility]   Location Address: [address] [place] [address]  [id number]   Location Phone: [phone]          Chief Complaint  · Surgical History and Physical  · Screening Colonoscopy      History Of Present Illness  NON-INPATIENT HISTORY AND PHYSICAL  Allergies: I.V. Dye   Chief Complaint/History of Present Illness: EGD for GERD and Screening Colonoscopy, History Colon Polyps-TA   Colon Recall: No   Failed Outpatient Treatment/Contraindications: N/A   Current Medications: atenolol 50 mg oral tablet, benazepril 40 mg oral tablet, clonazepam 0.5 mg oral tablet, Diflucan 150 mg oral tablet, NuLYTELY with Flavor Packs 420 gram oral recon soln, omeprazole 40 mg oral capsule,delayed release(DR/EC), pravastatin 20 mg oral tablet, spironolactone 25 mg oral tablet, and Suprep Bowel Prep Kit 17.5-3.13-1.6 gram oral recon soln   Significant Past Medical History: Bloating, Breast cancer, Breast Neoplasm, Malignant, Essential hypertension, Generalized anxiety disorder, Hematuria, Herpes simplex, Hyperlipidemia, Osteoarthritis; multiple sites, Polyarthritis of multiple sites, Subclinical hypothyroidism, Tinnitus, and Vertigo   Significant Family Medical History: No Family History of Colon Cancer   Significant Past Surgical History: Ankle surgery, Artificial Joints/Limbs, Cataract exctraction with lens implant, Colonoscopy, EGD, Eye Implant, Hysterectomy, Joint Surgery, Knee replacement, right, Laparoscopic cholecystectomy, and Mastectomy, Right   Previous Colonoscopy: Yes YEAR: 2016 By Whom: Rhys Tomlinson MD   Previous EGD: Yes YEAR: 2013 By Whom: Rhys Tomlinson MD   PHYSICAL EXAM:  Heart: Regular Rate and Rhythm   Lungs: Breathing  Unlabored           Assessment  · Preoperative examination     V72.84/Z01.818  · Screening for colon cancer     V76.51/Z12.11  · GERD (gastroesophageal reflux disease)     530.81/K21.9  · History of colon polyps     V12.72/Z86.010      Plan  · Orders  o Consent for Esophagogastroduodenoscopy (EGD) with biopsy - Possible risks/complications, benefits, and alternatives to surgical or invasive procedure have been explained to patient and/or legal guardian. - Patient has been evaluated and can tolerate anesthesia and/or sedation. Risks, benefits, and alternatives to anesthesia and sedation have been explained to patient and/or legal guardian. ([id number]) - 530.81/K21.9 - 08/04/2020  o Consent for Colonoscopy Screening -Possible risk/complications, benefits, and alternatives to surgical or invasive procedure have been explained to patient and/or legal gaurdian. -Patient has been evaluated and can tolerate anethesia and/or sedation. Risk, benefits, and alternatives to anesthesia and sedation have been explained to patient or legal gaurdian. () - V72.84/Z01.818, V76.51/Z12.11, V12.72/Z86.010 - 08/04/2020  · Medications  o Medications have been Reconciled  o Transition of Care or Provider Policy  · Instructions  o ****Surgical Orders****  o ***************  o Outpatient  o ***************  o RISK AND BENEFITS:  o Possible risks/complications, benefits and alternatives to surgical or invasive procedure have been explained to the patient and/or legal guardian.  o Patient has been evaluated and can tolerate anesthesia and/or sedation. Risks, benefits, and alternatives to anesthesia and sedation have been explained to the patient and/or legal guardian.  o ***************  o PREP: Per protocol  o IV: Per Anesthesia  o The above History and Physical Examination has been completed within 30 days of admission.  o This note has been transcribed by CHARLIE Kong MA. I have read and agree with the findings in this  note.  o Electronically Identified Patient Education Materials Provided Electronically  · Disposition  o Call or Return if symptoms worsen or persist.            Electronically Signed by: Bradly Kong, -Author on June 2, 2020 09:18:20 AM

## 2021-05-12 NOTE — PROGRESS NOTES
"   Quick Note      Patient Name: Alina Velasquez   Patient ID: [id number]   Sex: Female   Birthdate: Lacy 3, 1945    Primary Care Provider: Magnus Lima DO   Referring Provider: Eileen Gutierrez MD    Visit Date: April 14, 2020    Provider: Magnus Lima DO   Location: [facility]   Location Address: [address] [place] [address]  [id number]   Location Phone: [phone]          History Of Present Illness  TELEHEALTH VISIT  Chief Complaint: burning in vaginal area, nausea, shaking, BLE pain. Stopped antibiotic 04/6/2020 but had two pills left, took last two pills 04/13/2020   Alina Velasquez is a 74 year old /White female who is presenting for evaluation via telehealth visit. Verbal consent obtained before beginning visit.   Provider spent 20 minutes with the patient during telehealth visit.   The following staff were present during this visit: Marcelino Hodge LPN   Past Medical History/Overview of Patient Symptoms     Telephone visit for f/u UTI. She states that the MacroBid upset her stomach and stopped it after 7 days. Since then she still feels nauseated, shakey. She still has burning upon urination.     Her anxiety is much worse. She has NOT taken the Clonazepam.       Vitals  Date Time BP Position Site L\R Cuff Size HR RR TEMP (F) WT  HT  BMI kg/m2 BSA m2 O2 Sat HC       02/17/2020 08:40 /59 Sitting    73 - R  98.1 145lbs 8oz 5'  2\" 26.61 1.7 97 %    03/13/2020 12:14 /66 Sitting    64 - R   144lbs 6oz 5'  2\" 26.41 1.69 100 %    03/31/2020 08:56 /66 Sitting    83 - R  98.7 141lbs 2oz 5'  2\" 25.81 1.67 100 %              Assessment  · Abdominal pain     789.00/R10.9  will check labs  · Anxiety disorder     300.00/F41.9  worse. She needs to use the Clonazepam   · Urinary tract infection     599.0/N39.0  She did take 6 days of the MacroBid. Will repeat her UA and Cx      Plan  · Orders  o CBC with Auto Diff [facility] ([id number]) - 789.00/R10.9 - 04/14/2020  [facility] " ([id number]) - 789.00/R10.9 - 04/14/2020  o Urinalysis with Reflex Microscopy if abnormal ([facility]) ([id number]) - 599.0/N39.0 - 04/14/2020  o Urine Culture [facility] ([id number]) - 599.0/N39.0 - 04/14/2020  o Amylase/Lipase Profile ([facility]) - 789.00/R10.9 - 04/14/2020  o H pylori breath test ([id number], 69616) - 789.00/R10.9 - 04/14/2020  · Medications  o Medications have been Reconciled  o Transition of Care or Provider Policy  · Instructions  o Instructed to seek medical attention urgently for new or worsening symptoms.  o Increase fluid intake. If you develop fever, chills, N/V, flank pain, or worsening of your symptoms return to the office or go to the ER.  o Plan Of Care:   o Chronic conditions reviewed and taken into consideration for today's treatment plan.  o Patient instructed to seek medical attention urgently for new or worsening symptoms.  o Patient was educated/instructed on their diagnosis, treatment and medications prior to discharge from the clinic today.  o Patient is taking medications as prescribed and doing well.   o Take all medications as prescribed/directed.  o Patient instructed/educated on their diet and exercise program.  · Disposition  o Call or Return if symptoms worsen or persist.  o Return Visit Request in/on 2 weeks +/- 2 days ([id number]).            Electronically Signed by: Magnus Lima DO -Author on April 14, 2020 12:41:01 PM

## 2021-05-13 NOTE — PROGRESS NOTES
Progress Note      Patient Name: Alina Velasquez   Patient ID: [id number]   Sex: Female   Birthdate: Lacy 3, 1945    Primary Care Provider: Magnus Lima DO   Referring Provider: Eileen Gutierrez MD    Visit Date: July 10, 2020    Provider: Magnus Lima DO   Location: [facility]   Location Address: [address] [place] [address]  [id number]   Location Phone: [phone]          Chief Complaint  · pt c/o abdominal pain, pt was seen in [facility] ER, discuss medicaion       History Of Present Illness  TELEHEALTH TELEPHONE VISIT  Chief Complaint: pt c/o abdominal pain, pt was seen in [facility] ER, discuss medicaion   Alina Velasquez is a 75 year old /White female who is presenting for evaluation via telehealth telephone visit. Verbal consent obtained before beginning visit.   Provider spent 15 minutes with patient during telehealth visit.   The following staff were present during this visit: Ailyn Mejia CMA   Past Medical History/Overview of Patient Symptoms     She was recently to [facility] ER with worsening abdominal pain. Her evaluation there was completely normal. They added Carafate. She feels better. She is scheduled for endoscopy the 1st week of August.       Past Medical History  Disease Name Date Onset Notes   Bloating 06/17/2016 --    Breast cancer 10/13/2017 --    Breast Neoplasm, Malignant 1996 Rt breast   Essential hypertension 03/17/2017 --    Generalized anxiety disorder 03/04/2019 --    Hematuria 10/30/2017 --    Herpes simplex 04/16/2018 --    Hyperlipidemia 06/17/2016 --    Osteoarthritis; multiple sites 1994 throughout whole body   Polyarthritis of multiple sites 07/13/2017 --    Subclinical hypothyroidism 06/17/2016 --    Tinnitus 07/13/2017 --    Vertigo 07/13/2017 --          Past Surgical History  Procedure Name Date Notes   Ankle surgery 1995 Lt ankle d/t broken ankle injury   Artificial Joints/Limbs --  --    Cataract exctraction with lens implant 2008 --    Colonoscopy  2013 2016 --    EGD 2013 --    Eye Implant --  yes   Hysterectomy --  Partial hysterectomy   Joint Surgery --  --    Knee replacement, right 2012 Dr. Gupta-Ortho   Laparoscopic cholecystectomy 2015 --    Mastectomy, Right 1996 Dr. Trent Sun         Medication List  Name Date Started Instructions   atenolol 50 mg oral tablet 07/09/2020 TAKE 1 TABLET BY MOUTH ONCE DAILY FOR 90 DAYS   benazepril 40 mg oral tablet 06/05/2020 TAKE 1 TABLET BY MOUTH ONCE DAILY   Carafate 1 gram oral tablet  take 1 tablet (1 gram) by oral route 4 times per day on an empty stomach 1 hour before meals and at bedtime   clonazepam 0.5 mg oral tablet 06/09/2020 TAKE 1 TABLET BY MOUTH ONCE DAILY IN THE MORNING   dicyclomine 10 mg oral capsule 06/09/2020 take 1 capsule (10 mg) by oral route 3 times per day before meals for 30 days   NuLYTELY with Flavor Packs 420 gram oral recon soln 06/02/2020 take as directed for bowel prep   omeprazole 40 mg oral capsule,delayed release(/EC) 01/29/2019 take 1 capsule (40 mg) by oral route once daily before a meal for 90 days   pravastatin 20 mg oral tablet 08/12/2019 TAKE ONE TABLET BY MOUTH ONCE DAILY AT BEDTIME   spironolactone 25 mg oral tablet 07/06/2020 TAKE 1 TABLET BY MOUTH ONCE DAILY   Suprep Bowel Prep Kit 17.5-3.13-1.6 gram oral recon soln 06/02/2020 take as directed for bowel prep         Allergy List  Allergen Name Date Reaction Notes   I.V. Dye --  --  --          Family Medical History  Disease Name Relative/Age Notes   Heart Disease Brother/   Brother   Hypertension Brother/  Mother/   --    Diabetes, unspecified type Brother/   Brother   - No Family History of Colorectal Cancer  --    Family history of certain chronic disabling diseases; arthritis Mother/  Sister/   Mother; Sister   Osteoporosis Mother/  Sister/   Mother; Sister         Reproductive History  Menstrual   Age Menarche: 11   Pregnancy Summary   Total Pregnancies: 2 Full Term: 2 Premature: 0   Ab Induced: 0 Ab Spontaneous: 0  "Ectopics: 0   Multiples: 0 Livin         Social History  Finding Status Start/Stop Quantity Notes   Active but no formal exercise --  --/-- --  --    Alcohol Never --/-- --  2019 - 2019 - 2019 - 10/16/2018 -    Alcohol Use Never --/-- --  does not drink   Claustophobic Unknown --/-- --  yes   Homemaker. --  --/-- --  --    lives with spouse --  --/-- --  --     --  --/-- --  --    No known infection risk --  --/-- --  --    Tobacco Never --/-- --  never smoker          Immunizations  NameDate Admin Mfg Trade Name Lot Number Route Inj VIS Given VIS Publication   Nmfcdpmbt17/2019 [facility] Fluzone Quadrivalent [id number] IM LD 10/01/2019    Comments: NDC 98723-598-21 Pt tolerated without complaints   Ooidezjay51/ [facility] Fluzone Quadrivalent [id number] IM LD 10/16/2018 2015   Comments: NDC:[id number]   Axrcsklwb97/ [facility] Fluzone Quadrivalent [id number] IM LD 10/13/2017 2015   Comments: pt tolerated well   Uzclngqlf29/ [facility] Fluarix, quadrivalent, preservative free [id number] ID LA 10/14/2015 2015   Comments:    InfluenzaRefused 2014 NE Not Entered  NE NE     Comments: Pt refused.   Dfyujrexrxwy20/20/2014 MSD PREVNAR 13 [id number] IM RD 2014   Comments: Pt tolerated well.   Prevnar 131 WAL PREVNAR 13 [id number] IM LD 10/16/2018 2015   Comments: NDC:[id number]   [id number] SKB BOOSTRIX [id number] IM LD 2014   Comments: Pt tolerated well.         Review of Systems  · Constitutional  o Denies  o : fatigue  · Gastrointestinal  o Admits  o : nausea, constipation, abdominal pain  o Denies  o : vomiting, diarrhea, blood in stools, melena      Vitals  Date Time BP Position Site L\R Cuff Size HR RR TEMP (F) WT  HT  BMI kg/m2 BSA m2 O2 Sat HC       2020 03:30 /71 Sitting    72 - R 20 98.4 136lbs 8oz 5'  2\" 24.97 1.65 98 %    2020 04:08 /76 Sitting               2020 02:08 /58 Sitting    60 - R  99 137lbs " "6oz 5'  2\" 25.13 1.65 100 %              Assessment  · Abdominal pain     789.00/R10.9  will continue her current meds and await her endoscopy. I think it is more likely IBS  · Bloating     787.3/R14.0  unchanged      Plan  · Orders  o ACO-39: Current medications updated and reviewed () - - 07/10/2020  · Medications  o Medications have been Reconciled  o Transition of Care or Provider Policy  · Instructions  o Instructed to seek medical attention urgently for new or worsening symptoms.  o Plan Of Care:   o Chronic conditions reviewed and taken into consideration for today's treatment plan.  o Patient instructed to seek medical attention urgently for new or worsening symptoms.  o Patient was educated/instructed on their diagnosis, treatment and medications prior to discharge from the clinic today.  o Patient is taking medications as prescribed and doing well.   o Take all medications as prescribed/directed.  o Patient instructed/educated on their diet and exercise program.  · Disposition  o Call or Return if symptoms worsen or persist.  o Return Visit Request in/on 2 months +/- 2 days ([id number]).            Electronically Signed by: Magnus Lima, DO -Author on July 10, 2020 12:34:43 PM  "

## 2021-05-13 NOTE — PROGRESS NOTES
Progress Note      Patient Name: Alina Velasquez   Patient ID: [id number]   Sex: Female   Birthdate: Lacy 3, 1945    Primary Care Provider: Magnus Lima DO    Visit Date: October 6, 2020    Provider: Magnus Lima DO   Location: [facility]   Location Address: [address] [place] [address]  [id number]   Location Phone: [phone]          Chief Complaint  · f/u HTN and JUICE      History Of Present Illness  Alina Velasquez is a 75 year old /White female who presents for evaluation and treatment of: HTN. She states that she has been checking her BP and it has been better. It has been 120-140/70-80.      She also has generalized anxiety d/o. She states that it is improved. She has been watching less new.       Past Medical History  Disease Name Date Onset Notes   Bloating 06/17/2016 --    Breast cancer 10/13/2017 --    Breast Neoplasm, Malignant 1996 Rt breast   Essential hypertension 03/17/2017 --    Generalized anxiety disorder 03/04/2019 --    Hematuria 10/30/2017 --    Herpes simplex 04/16/2018 --    Hyperlipidemia 06/17/2016 --    Osteoarthritis; multiple sites 1994 throughout whole body   Polyarthritis of multiple sites 07/13/2017 --    Subclinical hypothyroidism 06/17/2016 --    Tinnitus 07/13/2017 --    Vertigo 07/13/2017 --          Past Surgical History  Procedure Name Date Notes   Ankle surgery 1995 Lt ankle d/t broken ankle injury   Artificial Joints/Limbs --  --    Cataract exctraction with lens implant 2008 --    Colonoscopy 2013 2016 2020 --    EGD 2013 2020 --    Eye Implant --  yes   Hysterectomy --  Partial hysterectomy   Joint Surgery --  --    Knee replacement, right 2012 Dr. Gupta-Orth[facility]   Laparoscopic cholecystectomy 2015 --    Mastectomy, Right 1996 Dr. Trent Sun         Medication List  Name Date Started Instructions   atenolol 50 mg oral tablet 09/17/2020 take 1 tablet (50 mg) by oral route 2 times per day for 90 days   benazepril 40 mg  oral tablet 2020 TAKE 1 TABLET BY MOUTH ONCE DAILY   Carafate 1 gram oral tablet 2020 take 1 tablet (1 gram) by oral route 4 times per day on an empty stomach 1 hour before meals and at bedtime for 90 days   clonazepam 0.5 mg oral tablet 2020 TAKE 1 TABLET BY MOUTH ONCE DAILY IN THE MORNING   dicyclomine 10 mg oral capsule 2020 TAKE 1 CAPSULE BY MOUTH THREE TIMES DAILY BEFORE MEAL(S) FOR 30 DAYS   Miralax 17 gram/dose oral powder 09/15/2020 take 17 gram mixed with 8 oz. water, juice, soda, coffee or tea by oral route once daily   omeprazole 40 mg oral capsule,delayed release(/EC) 2019 take 1 capsule (40 mg) by oral route once daily before a meal for 90 days   pravastatin 20 mg oral tablet 2020 TAKE 1 TABLET BY MOUTH ONCE DAILY AT BEDTIME   spironolactone 25 mg oral tablet 2020 TAKE 1 TABLET BY MOUTH ONCE DAILY         Allergy List  Allergen Name Date Reaction Notes   I.V. Dye --  --  --          Family Medical History  Disease Name Relative/Age Notes   Heart Disease Brother/   Brother   Hypertension Brother/  Mother/   --    Diabetes, unspecified type Brother/   Brother   - No Family History of Colorectal Cancer  --    Family history of certain chronic disabling diseases; arthritis Mother/  Sister/   Mother; Sister   Osteoporosis Mother/  Sister/   Mother; Sister         Reproductive History  Menstrual   Age Menarche: 11   Pregnancy Summary   Total Pregnancies: 2 Full Term: 2 Premature: 0   Ab Induced: 0 Ab Spontaneous: 0 Ectopics: 0   Multiples: 0 Livin         Social History  Finding Status Start/Stop Quantity Notes   Active but no formal exercise --  --/-- --  --    Alcohol Never --/-- --  2019 - 2019 - 2019 - 10/16/2018 -    Alcohol Use Never --/-- --  does not drink   Claustophobic Unknown --/-- --  yes   Homemaker. --  --/-- --  --    lives with spouse --  --/-- --  --     --  --/-- --  --    No known infection risk --  --/-- --  --   "  Tobacco Never --/-- --  never smoker          Immunizations  NameDate Admin Mfg Trade Name Lot Number Route Inj VIS Given VIS Publication   Mwuftmcdy02/06/2020 SKB Fluarix, quadrivalent, preservative free [id number] IM LD 10/06/2020 08/15/2019   Comments: PT TOLERATED WELL NDC 35502-778-42   Iurjckbptwee08/20/2014 MSD PREVNAR 13 [id number] IM RD 11/20/2014 02/27/2013   Comments: Pt tolerated well.   Prevnar 1310/16/2018 WAL PREVNAR 13 [id number] IM LD 10/16/2018 11/05/2015   Comments: NDC:[id number]   Tdap11/20/2014 SKB BOOSTRIX [id number] IM LD 11/20/2014 05/09/2013   Comments: Pt tolerated well.         Review of Systems  · Constitutional  o Denies  o : fatigue  · Eyes  o Denies  o : changes in vision  · HENT  o Denies  o : headaches  · Cardiovascular  o Denies  o : chest pain, irregular heart beats, rapid heart rate, dyspnea on exertion  · Respiratory  o Denies  o : shortness of breath, wheezing, cough  · Gastrointestinal  o Admits  o : constipation  o Denies  o : nausea, vomiting, diarrhea, abdominal pain  · Psychiatric  o Admits  o : anxiety  o Denies  o : depression, suicidal ideation, homicidal ideation      Vitals  Date Time BP Position Site L\R Cuff Size HR RR TEMP (F) WT  HT  BMI kg/m2 BSA m2 O2 Sat FR L/min FiO2 HC       09/17/2020 01:30 /78 Sitting    65 - R  98.9 132lbs 2oz 5'  2\" 24.17 1.62 100 %  21%    10/06/2020 10:06 /71 Sitting    67 - R  96.8 131lbs 8oz 5'  2\" 24.05 1.62 100 %  21%    10/06/2020 10:41 /72 Sitting                       Physical Examination  · Constitutional  o Appearance  o : well-nourished, well developed, alert, in no acute distress, well-tended appearance  · Head and Face  o Head  o :   § Inspection  § : atraumatic, normocephalic  o Face  o :   § Inspection  § : no facial lesions  o HEENT  o : Unremarkable  · Eyes  o Conjunctivae  o : conjunctivae normal  o Sclerae  o : sclerae white  o Pupils and Irises  o : pupils equal and round, pupils reactive to light " bilaterally  o Eyelids/Ocular Adnexae  o : eyelid appearance normal  · Ears, Nose, Mouth and Throat  o Ears  o :   § External Ears  § : appearance within normal limits, no lesions present  § Otoscopic Examination  § : tympanic membrane appearance within normal limits bilaterally without perforations, mobility normal  o Nose  o :   § External Nose  § : appearance normal  o Oral Cavity  o :   § Oral Mucosa  § : oral mucosa normal  § Lips  § : lip appearance normal  § Teeth  § : normal dentition for age  § Gums  § : gums pink, non-swollen, no bleeding present  § Tongue  § : tongue appearance normal  § Palate  § : hard palate normal, soft palate appearance normal  o Throat  o :   § Oropharynx  § : no inflammation or lesions present, tonsils within normal limits  · Neck  o Inspection/Palpation  o : normal appearance, no masses or tenderness, trachea midline  o Thyroid  o : gland size normal, nontender, no nodules or masses present on palpation  · Respiratory  o Respiratory Effort  o : breathing unlabored  o Auscultation of Lungs  o : normal breath sounds  · Cardiovascular  o Heart  o :   § Auscultation of Heart  § : regular rate, normal rhythm, no murmurs present  o Peripheral Vascular System  o :   § Extremities  § : no edema  · Lymphatic  o Neck  o : no lymphadenopathy           Assessment  · Need for influenza vaccination     V04.81/Z23  · Essential hypertension     401.9/I10  Her home BP readings are overall good, but elevated here this am. Will recheck. Upon re-check it was better. Her anxiety seems to be an aggravating factor  · GERD (gastroesophageal reflux disease)     530.81/K21.9  improved  · Bloating     787.3/R14.0  I would continue her current meds  · Generalized anxiety disorder     300.02/F41.1  improved  · Subclinical hypothyroidism     244.8/E03.9    Problems Reconciled  Plan  · Orders  o Immunization Admin Fee (Single) ([facility]) (90024) - V04.81/Z23 - 10/06/2020  o Fluzone High Dose Flu Vaccine (88720) -  V04.81/Z23 - 10/06/2020   Vaccine - Influenza; Dose: 0.5; Site: Left Deltoid; Route: Intramuscular; Date: 10/06/2020 11:16:00; Exp: 06/30/2021; Lot: [id number]; Mfg: [employer]; TradeName: Fluarix, quadrivalent, preservative free; Administered By: Juliet Ramires MA; Comment: PT TOLERATED WELL NDC 29310-202-59  o CMP [facility] (79726) - 401.9/I10 - 10/06/2020  o Lipid Panel [facility] (28774) - 401.9/I10 - 10/06/2020  o Thyroid Profile (38441, 76093, THYII) - 244.8/E03.9 - 10/06/2020  o ACO-39: Current medications updated and reviewed (1159F, ) - - 10/06/2020  · Medications  o Medications have been Reconciled  o Transition of Care or Provider Policy  · Instructions  o Patient advised to monitor blood pressure (B/P) at home and journal readings. Patient informed that a B/P reading at home of more than 130/80 is considered hypertension. For readings greater bpbe640/90 or higher patient is advised to follow up in the office with readings for management. Patient advised to limit sodium intake.  o Maintain a healthy weight. Avoid tight fitting clothes. Avoid fried, fatty foods, tomato sauce, chocolate, mint, garlic, onion, alcohol. caffeine. Eat smaller meals, dont lie down after a meal, dont smoke. Elevate the head of your bed 6-9 inches.  o Patient is taking medications as prescribed and doing well.   o Take all medications as prescribed/directed.  o Patient instructed/educated on their diet and exercise program.  o Patient was educated/instructed on their diagnosis, treatment and medications prior to discharge from the clinic today.  o Patient instructed to seek medical attention urgently for new or worsening symptoms.  o Call the office with any concerns or questions.  o Bring all medicines with their bottles to each office visit.  · Disposition  o Call or Return if symptoms worsen or persist.  o Return Visit Request in/on 4 months +/- 2 days (11226).            Electronically Signed by: Magnus Lima, DO -Author on  October 6, 2020 03:01:23 PM

## 2021-05-13 NOTE — PROGRESS NOTES
Quick Note      Patient Name: Alina Velasquez   Patient ID: [id number]   Sex: Female   Birthdate: Lacy 3, 1945    Primary Care Provider: Magnus Lima DO   Referring Provider: Eileen Gutierrez MD    Visit Date: June 1, 2020    Provider: Luciana Dukes MD   Location: [facility]   Location Address: [address] [place] [address]  [id number]   Location Phone: [phone]          History Of Present Illness  The patient is a 72 year old /White female , who is a follow up from Magnus Lima DO , for the evaluation of microhematuria. The patient was found to have microhematuria on an urinalysis approximately 6 years ago.     She states the color of her urine has been grossly clear. The patient has no additional complaints. She denies frequency, urgency, dysuria, back pain, fever, chills, nausea, vomiting, and weight loss.     She states that there is no history of recent abdominal or flank trauma. The patient's past medical history is noncontributory.     The patient has not been previously evaluated for hematuria.   TELEHEALTH TELEPHONE VISIT  Alina Velasquez is a 74 year old /White female who is presenting for evaluation via telehealth telephone visit. Verbal consent obtained before beginning visit.   Provider spent 7 minutes with the patient during telehealth visit.   The following staff were present during this visit: Oanh Arboleda and Luciana Dukes MD      She has had microscopic hematuria dating back in the early 2000's.  She has had recent hematuria in the office with PCP.    All urine cultures negative.  No history of stones  No smoking history    Patient is allergic to IVP dye.  She had oral contrast and it showed - no stones.    She needs to complete her workup with a cysto and retrogrades.    Her cysto and retrogrades were normal in 2017.           Assessment  · Hematuria     599.70/R31.9  · Microscopic hematuria     599.72/R31.29      Plan  · Orders  o Physican  Telephone evaluation, 5-10 min (04734) - 599.72/R31.29, 599.70/R31.9 - 06/01/2020  · Medications  o Diflucan 150 mg oral tablet   SIG: take 1 tablet by oral route QD for 2 days   DISP: (2) tablets with 0 refills  Prescribed on 06/01/2020     o Medications have been Reconciled  o Transition of Care or Provider Policy  · Instructions  o Folllow up in one year. Workup for hematuria is negative.   o Plan Of Care:   o Chronic conditions reviewed and taken into consideration for today's treatment plan.  o Patient instructed to seek medical attention urgently for new or worsening symptoms.  o Patient was educated/instructed on their diagnosis, treatment and medications prior to discharge from the clinic today.  o Discussed Covid-19 precautions including, but not limited to, social distancing, avoid touching your face, and hand washing.             Electronically Signed by: Luciana Dukes MD -Author on June 1, 2020 05:11:56 PM

## 2021-05-13 NOTE — PROGRESS NOTES
Progress Note      Patient Name: Alina Velasquez   Patient ID: [id number]   Sex: Female   Birthdate: Lacy 3, 1945    Primary Care Provider: Magnus Lima DO    Visit Date: September 15, 2020    Provider: Nelson Almonte MD   [facility]   Location Address: [address] [place] [address]  [id number]   Location Phone: [phone]          Chief Complaint  · Follow up of Colonoscopy      History Of Present Illness     75-year-old female returns after having an EGD/colonoscopy on 08/04/2020 by Dr. Almonte.  EGD/colonoscopy revealed small hiatal hernia, diverticuli in the sigmoid colon, 4 mm tubular adenoma polyp in the cecum, 4 mm tubular adenoma in the hepatic flexure, and a single 2 cm tubulovillous adenoma in the ascending colon that was removed in piecemeal fashion.  She does report that her epigastric pain is better, especially after taking Carafate 1 g 4 times daily.  She has been describe dicyclomine, which did help at first, but she feels this not helping as well.  She reports that she is having a bowel movement every 3 days, especially after starting her new GI medications.  She reports using Colace every other day with no relief. She is taking omeprazole 40 mg which is controlling her reflux and heartburn.  She does report she does have occasional nausea, but it does not occur very often.  She denies vomiting.  She denies NSAID use and smoking.  She does have a history of breast cancer which was treated with a right mastectomy, chemo, and radiation.    Her BP is high in office today.  Pt self-administered a blood pressure pill in office because it was due.  She reports that her blood pressure has been running high lately, even after taking her medications.  She reports that she has been getting headaches.       Past Medical History  Bloating; Breast cancer; Breast Neoplasm, Malignant; Essential hypertension; Generalized anxiety disorder; Hematuria; Herpes simplex;  "Hyperlipidemia; Osteoarthritis; multiple sites; Polyarthritis of multiple sites; Subclinical hypothyroidism; Tinnitus; Vertigo         Past Surgical History  Ankle surgery; Artificial Joints/Limbs; Cataract exctraction with lens implant; Colonoscopy; EGD; Eye Implant; Hysterectomy; Joint Surgery; Knee replacement, right; Laparoscopic cholecystectomy; Mastectomy, Right         Medication List  atenolol 50 mg oral tablet; benazepril 40 mg oral tablet; Carafate 1 gram oral tablet; clonazepam 0.5 mg oral tablet; dicyclomine 10 mg oral capsule; omeprazole 40 mg oral capsule,delayed release(DR/EC); pravastatin 20 mg oral tablet; spironolactone 25 mg oral tablet         Allergy List  I.V. Dye         Family Medical History  Heart Disease; Hypertension; Diabetes, unspecified type; - No Family History of Colorectal Cancer; Family history of certain chronic disabling diseases; arthritis; Osteoporosis         Reproductive History   2 Para 2 0 0 0       Social History  Active but no formal exercise; Alcohol (Never); Alcohol Use (Never); Claustophobic (Unknown); Homemaker.; lives with spouse; ; No known infection risk; Tobacco (Never)         Immunizations  Name Date Admin   Influenza    Influenza    Influenza    Influenza    Influenza Refused   Pneumococcal    Prevnar 13    Tdap          Review of Systems  · Constitutional  o Denies  o : chills, fever  · Cardiovascular  o Denies  o : chest pain  · Respiratory  o Denies  o : shortness of breath  · Gastrointestinal  o Denies  o : nausea, vomiting, dysphagia  · Endocrine  o Denies  o : weight gain, weight loss      Vitals  Date Time BP Position Site L\R Cuff Size HR RR TEMP (F) WT  HT  BMI kg/m2 BSA m2 O2 Sat        09/15/2020 03:29 /68 Sitting    64 - R 12  133lbs 1oz 5'  2\" 24.34 1.62 100 %          Physical Examination  · Constitutional  o Appearance  o : Healthy-appearing, awake and alert in no acute distress  · Head and Face  o Head  o : Normocephalic " with no worriesome skin lesions  · Eyes  o Vision  o :   § Visual Fields  § : eyes move symmetrical in all directions  o Sclerae  o : sclerae anicteric  · Neck  o Inspection/Palpation  o : Trachea is midline, no adenopathy  · Respiratory  o Respiratory Effort  o : Breathing is unlabored.  o Inspection of Chest  o : normal appearance  o Auscultation of Lungs  o : Chest is clear to auscultation bilaterally.  · Cardiovascular  o Heart  o :   § Auscultation of Heart  § : no murmurs, rubs, or gallops, RRR  o Peripheral Vascular System  o :   § Extremities  § : no cyanosis, clubbing or edema;   · Gastrointestinal  o Abdominal Examination  o : Abdomen is soft, nontender to palpation, with normal active bowel sounds, no appreciable hepatosplenomegaly.          Assessment  · Colonic Polyps, Personal History of     V12.72  · Gastroesophageal Reflux     530.81/K21.9  · Screening for colon cancer     V76.51/Z12.11      Plan  · Orders  o Consent for Colonoscopy Screening -Possible risk/complications, benefits, and alternatives to surgical or invasive procedure have been explained to patient and/or legal guardian. -Patient has been evaluated and can tolerate anesthesia and/or sedation. Risks, benefits, and alternatives to anesthesia and sedation have been explained to patient and/or legal guardian. () - V12.72, V76.51/Z12.11 - 09/15/2020  · Medications  o Miralax 17 gram/dose oral powder   SIG: take 17 gram mixed with 8 oz. water, juice, soda, coffee or tea by oral route once daily   DISP: (1) 119 gm jar with 3 refills  Prescribed on 09/15/2020     o Medications have been Reconciled  o Transition of Care or Provider Policy  · Instructions  o 75-year-old female returning after having EGD/colonoscopy 2020. I have discussed the results of her EGD/colonoscopy. I recommended that she take dicyclomine only as needed as constipation is a side effect. I have also sent in a prescription of MiraLAX to help improve her bowel movements.  The patient states that she has been weaning herself off the Carafate and has had success. She had a 2 cm tubal villous adenoma removed in her ascending colon in a piecemeal fashion. I have recommended that she have a repeat colonoscopy due to the type and size of her polyp that was removed. Pt is agreeable. We will get her scheduled for a colonoscopy. We are calling to get her into her PCP this week to address her high blood pressure.  o Electronically Identified Patient Education Materials Provided Electronically            Electronically Signed by: Nelson Almonte MD -Author on September 15, 2020 04:59:32 PM

## 2021-05-13 NOTE — PROGRESS NOTES
Progress Note      Patient Name: Alina Velasquez   Patient ID: [id number]   Sex: Female   Birthdate: Lacy 3, 1945    Primary Care Provider: Magnus Lima DO   Referring Provider: Eileen Gutierrez MD    Visit Date: June 9, 2020    Provider: Magnus Lima DO   Location: [facility]   Location Address: [address] [place] [address]  [id number]   Location Phone: [phone]          Chief Complaint  · 4 week follow up - HTN, Bloating, anxiety       History Of Present Illness  Alina Velasquez is a 75 year old /White female who presents for evaluation and treatment of: anxiety. She states that her anxiety has been better. She has been using the Clonazepam once daily. Her appetite is poor, but she is eating. She still has nausea upon eating.       Past Medical History  Disease Name Date Onset Notes   Bloating 06/17/2016 --    Breast cancer 10/13/2017 --    Breast Neoplasm, Malignant 1996 Rt breast   Essential hypertension 03/17/2017 --    Generalized anxiety disorder 03/04/2019 --    Hematuria 10/30/2017 --    Herpes simplex 04/16/2018 --    Hyperlipidemia 06/17/2016 --    Osteoarthritis; multiple sites 1994 throughout whole body   Polyarthritis of multiple sites 07/13/2017 --    Subclinical hypothyroidism 06/17/2016 --    Tinnitus 07/13/2017 --    Vertigo 07/13/2017 --          Past Surgical History  Procedure Name Date Notes   Ankle surgery 1995 Lt ankle d/t broken ankle injury   Artificial Joints/Limbs --  --    Cataract exctraction with lens implant 2008 --    Colonoscopy 2013 2016 --    EGD 2013 --    Eye Implant --  yes   Hysterectomy --  Partial hysterectomy   Joint Surgery --  --    Knee replacement, right 2012 Dr. Gupta-Ortho   Laparoscopic cholecystectomy 2015 --    Mastectomy, Right 1996 Dr. Trent Sun         Medication List  Name Date Started Instructions   atenolol 50 mg oral tablet 04/15/2020 take 1 tablet (50 mg) by oral route once daily for 90 days   benazepril 40 mg  oral tablet 2020 TAKE 1 TABLET BY MOUTH ONCE DAILY   clonazepam 0.5 mg oral tablet 2020 TAKE 1 TABLET BY MOUTH ONCE DAILY IN THE MORNING   NuLYTELY with Flavor Packs 420 gram oral recon soln 2020 take as directed for bowel prep   omeprazole 40 mg oral capsule,delayed release(/EC) 2019 take 1 capsule (40 mg) by oral route once daily before a meal for 90 days   pravastatin 20 mg oral tablet 2019 TAKE ONE TABLET BY MOUTH ONCE DAILY AT BEDTIME   spironolactone 25 mg oral tablet  take 1 tablet (25 mg) by oral route once daily   Suprep Bowel Prep Kit 17.5-3.13-1.6 gram oral recon soln 2020 take as directed for bowel prep         Allergy List  Allergen Name Date Reaction Notes   I.V. Dye --  --  --          Family Medical History  Disease Name Relative/Age Notes   Heart Disease Brother/   Brother   Hypertension Brother/  Mother/   --    Diabetes, unspecified type Brother/   Brother   - No Family History of Colorectal Cancer  --    Family history of certain chronic disabling diseases; arthritis Mother/  Sister/   Mother; Sister   Osteoporosis Mother/  Sister/   Mother; Sister         Reproductive History  Menstrual   Age Menarche: 11   Pregnancy Summary   Total Pregnancies: 2 Full Term: 2 Premature: 0   Ab Induced: 0 Ab Spontaneous: 0 Ectopics: 0   Multiples: 0 Livin         Social History  Finding Status Start/Stop Quantity Notes   Active but no formal exercise --  --/-- --  --    Alcohol Never --/-- --  2019 - 2019 - 2019 - 10/16/2018 -    Alcohol Use Never --/-- --  does not drink   Claustophobic Unknown --/-- --  yes   Homemaker. --  --/-- --  --    lives with spouse --  --/-- --  --     --  --/-- --  --    No known infection risk --  --/-- --  --    Tobacco Never --/-- --  never smoker          Immunizations  NameDate Admin Mfg Trade Name Lot Number Route Inj VIS Given VIS Publication   Cxalftxzg27/2019 [facility] Fluzone Quadrivalent [id number]  LD  "10/01/2019    Comments: NDC 66246-670-50 Pt tolerated without complaints   Lyhvezjuu26/16/2018 [facility] Fluzone Quadrivalent [id number] IM LD 10/16/2018 08/07/2015   Comments: NDC:[id number]   Wfeoasbvm59/13/2017 [facility] Fluzone Quadrivalent [id number] IM LD 10/13/2017 08/07/2015   Comments: pt tolerated well   Hqsurlnul43/14/2015 [facility] Fluarix, quadrivalent, preservative free [id number] ID LA 10/14/2015 06/22/2015   Comments:    InfluenzaRefused 11/20/2014 NE Not Entered  NE NE     Comments: Pt refused.   Ttrujnntwvdr62/20/2014 MSD PREVNAR 13 [id number] IM RD 11/20/2014 02/27/2013   Comments: Pt tolerated well.   Prevnar 1310/16/2018 [facility] PREVNAR 13 [id number] IM LD 10/16/2018 11/05/2015   Comments: NDC:[id number]   Tdap11/20/2014 [facility] BOOSTRIX 9544Y IM LD 11/20/2014 05/09/2013   Comments: Pt tolerated well.         Review of Systems  · Constitutional  o Admits  o : loss of appetite  o Denies  o : fatigue  · Gastrointestinal  o Admits  o : nausea, bloating  o Denies  o : vomiting, diarrhea, constipation  · Psychiatric  o Admits  o : anxiety  o Denies  o : depression, suicidal ideation, homicidal ideation      Vitals  Date Time BP Position Site L\R Cuff Size HR RR TEMP (F) WT  HT  BMI kg/m2 BSA m2 O2 Sat HC       05/05/2020 03:30 /71 Sitting    72 - R 20 98.4 136lbs 8oz 5'  2\" 24.97 1.65 98 %    05/05/2020 04:08 /76 Sitting               06/09/2020 02:08 /58 Sitting    60 - R  99 137lbs 6oz 5'  2\" 25.13 1.65 100 %          Physical Examination  · Constitutional  o Appearance  o : well-nourished, well developed, alert, in no acute distress, well-tended appearance  · Head and Face  o Head  o :   § Inspection  § : atraumatic, normocephalic  o Face  o :   § Inspection  § : no facial lesions  o HEENT  o : Unremarkable  · Eyes  o Conjunctivae  o : conjunctivae normal  o Sclerae  o : sclerae white  o Pupils and Irises  o : pupils equal and round, pupils reactive to light bilaterally  o Eyelids/Ocular Adnexae  o : eyelid appearance " normal  · Ears, Nose, Mouth and Throat  o Ears  o :   § External Ears  § : appearance within normal limits, no lesions present  § Otoscopic Examination  § : tympanic membrane appearance within normal limits bilaterally without perforations, mobility normal  o Nose  o :   § External Nose  § : appearance normal  o Oral Cavity  o :   § Oral Mucosa  § : oral mucosa normal  § Lips  § : lip appearance normal  § Teeth  § : normal dentition for age  § Gums  § : gums pink, non-swollen, no bleeding present  § Tongue  § : tongue appearance normal  § Palate  § : hard palate normal, soft palate appearance normal  o Throat  o :   § Oropharynx  § : no inflammation or lesions present, tonsils within normal limits  · Neck  o Inspection/Palpation  o : normal appearance, no masses or tenderness, trachea midline  o Thyroid  o : gland size normal, nontender, no nodules or masses present on palpation  · Respiratory  o Respiratory Effort  o : breathing unlabored  o Auscultation of Lungs  o : normal breath sounds  · Cardiovascular  o Heart  o :   § Auscultation of Heart  § : regular rate, normal rhythm, no murmurs present  o Peripheral Vascular System  o :   § Extremities  § : no edema  · Gastrointestinal  o Abdominal Examination  o : diffuse abdominal tenderness to palpation present, normal bowel sounds, tone normal without rigidity or guarding, no masses present  o Liver and spleen  o : no hepatomegaly present  · Lymphatic  o Neck  o : no lymphadenopathy           Assessment  · Bloating     787.3/R14.0  Imprved w/o bulk fiber. SHe probably also has IBS  · Generalized anxiety disorder     300.02/F41.1  Improved with the Clonazepam  · Weight loss     783.21/R63.4  her labs have all dameon normal. I think it is more di=ue to inadequate intake.   · Numbness and tingling of both feet       Anesthesia of skin     782.0/R20.0  Paresthesia of skin     782.0/R20.2  She has numbness of both feet for the past year. No burning or pain. Will get an  EMG      Plan  · Orders  o ACO-39: Current medications updated and reviewed () - - 06/09/2020  o EEG (Sleep Deprived) [facility] ([id number]) - 782.0/R20.0, 782.0/R20.2 - 06/09/2020  · Medications  o dicyclomine 10 mg oral capsule   SIG: take 1 capsule (10 mg) by oral route 3 times per day before meals for 30 days   DISP: (90) capsules with 2 refills  Prescribed on 06/09/2020     o clonazepam 0.5 mg oral tablet   SIG: TAKE 1 TABLET BY MOUTH ONCE DAILY IN THE MORNING   DISP: (30) Tablet with 2 refills  Adjusted on 06/09/2020     o Medications have been Reconciled  o Transition of Care or Provider Policy  · Instructions  o Patient is taking medications as prescribed and doing well.   o Take all medications as prescribed/directed.  o Patient instructed/educated on their diet and exercise program.  o Patient was educated/instructed on their diagnosis, treatment and medications prior to discharge from the clinic today.  o Patient instructed to seek medical attention urgently for new or worsening symptoms.  o Call the office with any concerns or questions.  o Bring all medicines with their bottles to each office visit.  · Disposition  o Call or Return if symptoms worsen or persist.  o Return Visit Request in/on 2 months +/- 2 days ([id number]).            Electronically Signed by: Magnus Lima DO -Author on June 9, 2020 02:52:03 PM

## 2021-05-13 NOTE — PROGRESS NOTES
Progress Note      Patient Name: Alina Velasquez   Patient ID: [id number]   Sex: Female   Birthdate: Lacy 3, 1945    Primary Care Provider: Magnus Lima DO    Visit Date: September 17, 2020    Provider: Magnus Lima DO   Location: [facility]   Location Address: [address] [place] [address]  [id number]   Location Phone: [phone]          Chief Complaint  · follow up- high blood pressure at colonoscopy      History Of Present Illness  Alina Velasquez is a 75 year old /White female who presents for evaluation and treatment of: HTN. She was to see GI for consultation and her BP was markedly elevated. She states that she has dameon taking her meds daily as directed. She checks her BP at home. It is typically better in the am and worsens as the day progresses.       Past Medical History  Disease Name Date Onset Notes   Bloating 06/17/2016 --    Breast cancer 10/13/2017 --    Breast Neoplasm, Malignant 1996 Rt breast   Essential hypertension 03/17/2017 --    Generalized anxiety disorder 03/04/2019 --    Hematuria 10/30/2017 --    Herpes simplex 04/16/2018 --    Hyperlipidemia 06/17/2016 --    Osteoarthritis; multiple sites 1994 throughout whole body   Polyarthritis of multiple sites 07/13/2017 --    Subclinical hypothyroidism 06/17/2016 --    Tinnitus 07/13/2017 --    Vertigo 07/13/2017 --          Past Surgical History  Procedure Name Date Notes   Ankle surgery 1995 Lt ankle d/t broken ankle injury   Artificial Joints/Limbs --  --    Cataract exctraction with lens implant 2008 --    Colonoscopy 2013 2016 2020 --    EGD 2013 2020 --    Eye Implant --  yes   Hysterectomy --  Partial hysterectomy   Joint Surgery --  --    Knee replacement, right 2012 Dr. Gupta-Ortho   Laparoscopic cholecystectomy 2015 --    Mastectomy, Right 1996 Dr. Trent Sun         Medication List  Name Date Started Instructions   atenolol 50 mg oral tablet 07/09/2020 TAKE 1 TABLET BY MOUTH ONCE  DAILY FOR 90 DAYS   benazepril 40 mg oral tablet 2020 TAKE 1 TABLET BY MOUTH ONCE DAILY   Carafate 1 gram oral tablet 2020 take 1 tablet (1 gram) by oral route 4 times per day on an empty stomach 1 hour before meals and at bedtime for 90 days   clonazepam 0.5 mg oral tablet 2020 TAKE 1 TABLET BY MOUTH ONCE DAILY IN THE MORNING   dicyclomine 10 mg oral capsule 2020 TAKE 1 CAPSULE BY MOUTH THREE TIMES DAILY BEFORE MEAL(S) FOR 30 DAYS   Miralax 17 gram/dose oral powder 09/15/2020 take 17 gram mixed with 8 oz. water, juice, soda, coffee or tea by oral route once daily   omeprazole 40 mg oral capsule,delayed release(/EC) 2019 take 1 capsule (40 mg) by oral route once daily before a meal for 90 days   pravastatin 20 mg oral tablet 2020 TAKE 1 TABLET BY MOUTH ONCE DAILY AT BEDTIME   spironolactone 25 mg oral tablet 2020 TAKE 1 TABLET BY MOUTH ONCE DAILY         Allergy List  Allergen Name Date Reaction Notes   I.V. Dye --  --  --          Family Medical History  Disease Name Relative/Age Notes   Heart Disease Brother/   Brother   Hypertension Brother/  Mother/   --    Diabetes, unspecified type Brother/   Brother   - No Family History of Colorectal Cancer  --    Family history of certain chronic disabling diseases; arthritis Mother/  Sister/   Mother; Sister   Osteoporosis Mother/  Sister/   Mother; Sister         Reproductive History  Menstrual   Age Menarche: 11   Pregnancy Summary   Total Pregnancies: 2 Full Term: 2 Premature: 0   Ab Induced: 0 Ab Spontaneous: 0 Ectopics: 0   Multiples: 0 Livin         Social History  Finding Status Start/Stop Quantity Notes   Active but no formal exercise --  --/-- --  --    Alcohol Never --/-- --  2019 - 2019 - 2019 - 10/16/2018 -    Alcohol Use Never --/-- --  does not drink   Claustophobic Unknown --/-- --  yes   Homemaker. --  --/-- --  --    lives with spouse --  --/-- --  --     --  --/-- --  --    No known  "infection risk --  --/-- --  --    Tobacco Never --/-- --  never smoker          Immunizations  NameDate Admin Mfg Trade Name Lot Number Route Inj VIS Given VIS Publication   Ezcozcfax33/01/2019 [facility] Fluzone Quadrivalent [id number] IM LD 10/01/2019    Comments: NDC 04942-978-77 Pt tolerated without complaints   Zcoucgtok29/16/2018 [facility] Fluzone Quadrivalent [id number] IM LD 10/16/2018 08/07/2015   Comments: NDC:[id number]   Invzdkhdj77/13/2017 [facility] Fluzone Quadrivalent [id number] IM LD 10/13/2017 08/07/2015   Comments: pt tolerated well   Gnnrghlws29/14/2015 [facility] Fluarix, quadrivalent, preservative free 32NZ7 ID LA 10/14/2015 06/22/2015   Comments:    InfluenzaRefused 11/20/2014 NE Not Entered  NE NE     Comments: Pt refused.   Vedirtyygyrn52/20/2014 MSD PREVNAR 13 [id number] IM RD 11/20/2014 02/27/2013   Comments: Pt tolerated well.   Prevnar 1310/16/2018 [facility] PREVNAR 13 [id number] IM LD 10/16/2018 11/05/2015   Comments: NDC:[id number]   Tdap11/20/2014 SKB BOOSTRIX 9544Y IM LD 11/20/2014 05/09/2013   Comments: Pt tolerated well.         Review of Systems  · Constitutional  o Denies  o : fatigue  · HENT  o Admits  o : headaches  · Cardiovascular  o Denies  o : chest pain, irregular heart beats, rapid heart rate  · Respiratory  o Denies  o : shortness of breath, wheezing, cough, dyspnea on exertion      Vitals  Date Time BP Position Site L\R Cuff Size HR RR TEMP (F) WT  HT  BMI kg/m2 BSA m2 O2 Sat HC       08/03/2020 01:56 /70 Sitting    57 - R  97.1 132lbs 8oz 5'  2\" 24.23 1.62 100 %    09/15/2020 03:29 /68 Sitting    64 - R 12  133lbs 1oz 5'  2\" 24.34 1.62 100 %    09/17/2020 01:30 /78 Sitting    65 - R  98.9 132lbs 2oz 5'  2\" 24.17 1.62 100 %          Physical Examination  · Constitutional  o Appearance  o : well-nourished, well developed, no obvious deformities present  · Respiratory  o Respiratory Effort  o : breathing unlabored, no accessory muscle use  o Auscultation of Lungs  o : normal breath " sounds  · Cardiovascular  o Heart  o :   § Auscultation of Heart  § : regular rate, normal rhythm, no murmurs present          Assessment  · Screening for depression     V79.0/Z13.89  · Essential hypertension     401.9/I10  Her BP is uncontrolled. Ti adjust her medication for better control.      Plan  · Orders  o ACO-18: Negative screen for clinical depression using a standardized tool () - V79.0/Z13.89 - 09/17/2020  o ACO-39: Current medications updated and reviewed () - - 09/17/2020  · Medications  o atenolol 50 mg oral tablet   SIG: take 1 tablet (50 mg) by oral route 2 times per day for 90 days   DISP: (180) Tablet with 1 refills  Adjusted on 09/17/2020     o Medications have been Reconciled  o Transition of Care or Provider Policy  · Instructions  o Depression Screen completed and scanned into the EMR under the designated folder within the patient's documents.  o Today's PHQ-9 result is 3  o Patient advised to monitor blood pressure (B/P) at home and journal readings. Patient informed that a B/P reading at home of more than 130/80 is considered hypertension. For readings greater gvwh256/90 or higher patient is advised to follow up in the office with readings for management. Patient advised to limit sodium intake.  o Patient is taking medications as prescribed and doing well.   o Take all medications as prescribed/directed.  o Patient instructed/educated on their diet and exercise program.  o Patient was educated/instructed on their diagnosis, treatment and medications prior to discharge from the clinic today.  o Patient instructed to seek medical attention urgently for new or worsening symptoms.  o Call the office with any concerns or questions.  o Bring all medicines with their bottles to each office visit.  · Disposition  o Call or Return if symptoms worsen or persist.  o Return Visit Request in/on 2 weeks +/- 2 days ([id number]).            Electronically Signed by: Magnus Lima, DO -Author on  September 17, 2020 01:56:59 PM

## 2021-05-13 NOTE — PROGRESS NOTES
Progress Note      Patient Name: Alina Velasquez   Patient ID: [id number]   Sex: Female   Birthdate: Lacy 3, 1945    Primary Care Provider: Magnus Lima DO   Referring Provider: Eileen Gutierrez MD    Visit Date: August 3, 2020    Provider: Magnus Lima DO   Location: [facility]   Location Address: [address] [place] [address]  [id number]   Location Phone: [phone]          Chief Complaint  · 6 month follow up - Anxiety, HTN, HLD, abdominal pain, bloating  · pt does not remember being in here at last visit   · pt has EGD/Colonoscopy in the morning      History Of Present Illness  Alina Velasquez is a 75 year old /White female who presents for evaluation and treatment of: abd pain. She states that her abdominal pain is improved. She is still doing the Carafate She has an endoscopy tomorrow AM. She states that she has no appetite, but has been attempting to eat.      She has chronic anxiety. She states that she is taking her medication daily and feels that it is well controlled       Past Medical History  Disease Name Date Onset Notes   Bloating 06/17/2016 --    Breast cancer 10/13/2017 --    Breast Neoplasm, Malignant 1996 Rt breast   Essential hypertension 03/17/2017 --    Generalized anxiety disorder 03/04/2019 --    Hematuria 10/30/2017 --    Herpes simplex 04/16/2018 --    Hyperlipidemia 06/17/2016 --    Osteoarthritis; multiple sites 1994 throughout whole body   Polyarthritis of multiple sites 07/13/2017 --    Subclinical hypothyroidism 06/17/2016 --    Tinnitus 07/13/2017 --    Vertigo 07/13/2017 --          Past Surgical History  Procedure Name Date Notes   Ankle surgery 1995 Lt ankle d/t broken ankle injury   Artificial Joints/Limbs --  --    Cataract exctraction with lens implant 2008 --    Colonoscopy 2013 2016 --    EGD 2013 --    Eye Implant --  yes   Hysterectomy --  Partial hysterectomy   Joint Surgery --  --    Knee replacement, right 2012 Dr. Gupta-Ortho    Laparoscopic cholecystectomy  --    Mastectomy, Right 1996 Dr. Trent Sun         Medication List  Name Date Started Instructions   atenolol 50 mg oral tablet 2020 TAKE 1 TABLET BY MOUTH ONCE DAILY FOR 90 DAYS   benazepril 40 mg oral tablet 2020 TAKE 1 TABLET BY MOUTH ONCE DAILY   Carafate 1 gram oral tablet 2020 take 1 tablet (1 gram) by oral route 4 times per day on an empty stomach 1 hour before meals and at bedtime for 90 days   clonazepam 0.5 mg oral tablet 2020 TAKE 1 TABLET BY MOUTH ONCE DAILY IN THE MORNING   dicyclomine 10 mg oral capsule 2020 take 1 capsule (10 mg) by oral route 3 times per day before meals for 30 days   NuLYTELY with Flavor Packs 420 gram oral recon soln 2020 take as directed for bowel prep   omeprazole 40 mg oral capsule,delayed release(/EC) 2019 take 1 capsule (40 mg) by oral route once daily before a meal for 90 days   pravastatin 20 mg oral tablet 2019 TAKE ONE TABLET BY MOUTH ONCE DAILY AT BEDTIME   spironolactone 25 mg oral tablet 2020 TAKE 1 TABLET BY MOUTH ONCE DAILY   Suprep Bowel Prep Kit 17.5-3.13-1.6 gram oral recon soln 2020 take as directed for bowel prep         Allergy List  Allergen Name Date Reaction Notes   I.V. Dye --  --  --          Family Medical History  Disease Name Relative/Age Notes   Heart Disease Brother/   Brother   Hypertension Brother/  Mother/   --    Diabetes, unspecified type Brother/   Brother   - No Family History of Colorectal Cancer  --    Family history of certain chronic disabling diseases; arthritis Mother/  Sister/   Mother; Sister   Osteoporosis Mother/  Sister/   Mother; Sister         Reproductive History  Menstrual   Age Menarche: 11   Pregnancy Summary   Total Pregnancies: 2 Full Term: 2 Premature: 0   Ab Induced: 0 Ab Spontaneous: 0 Ectopics: 0   Multiples: 0 Livin         Social History  Finding Status Start/Stop Quantity Notes   Active but no formal exercise --  --/--  --  --    Alcohol Never --/-- --  03/04/2019 - 01/29/2019 - 01/21/2019 - 10/16/2018 -    Alcohol Use Never --/-- --  does not drink   Claustophobic Unknown --/-- --  yes   Homemaker. --  --/-- --  --    lives with spouse --  --/-- --  --     --  --/-- --  --    No known infection risk --  --/-- --  --    Tobacco Never --/-- --  never smoker          Immunizations  NameDate Admin Mfg Trade Name Lot Number Route Inj VIS Given VIS Publication   Kkrficnxg13/01/2019 [facility] Fluzone Quadrivalent [id number] IM LD 10/01/2019    Comments: NDC 92520-982-24 Pt tolerated without complaints   Mayoopdhf07/16/2018 [facility] Fluzone Quadrivalent [id number] IM  10/16/2018 08/07/2015   Comments: NDC:[id number]   Fwsgrbgbk28/13/2017 [facility] Fluzone Quadrivalent [id number] IM LD 10/13/2017 08/07/2015   Comments: pt tolerated well   Bvhktkyjx97/14/2015 [facility] Fluarix, quadrivalent, preservative free [id number] ID LA 10/14/2015 06/22/2015   Comments:    InfluenzaRefused 11/20/2014 NE Not Entered  NE NE     Comments: Pt refused.   Impmkppbqakg64/20/2014 [employer] PREVNAR 13 [id number] IM RD 11/20/2014 02/27/2013   Comments: Pt tolerated well.   Prevnar 1310/16/2018 [facility] PREVNAR 13 [id number] IM LD 10/16/2018 11/05/2015   Comments: NDC:[id number]   Tdap11/20/2014 [facility] BOOSTRIX [id number] IM LD 11/20/2014 05/09/2013   Comments: Pt tolerated well.         Review of Systems  · Constitutional  o Admits  o : fatigue, loss of appetite  · HENT  o Denies  o : headaches  · Cardiovascular  o Admits  o : chest pain  o Denies  o : irregular heart beats, rapid heart rate  · Respiratory  o Denies  o : shortness of breath, wheezing, cough, dyspnea on exertion  · Gastrointestinal  o Admits  o : constipation, abdominal pain  o Denies  o : nausea, vomiting, diarrhea  · Psychiatric  o Admits  o : anxiety      Vitals  Date Time BP Position Site L\R Cuff Size HR RR TEMP (F) WT  HT  BMI kg/m2 BSA m2 O2 Sat HC       05/05/2020 04:08 /76 Sitting               06/09/2020 02:08 /58 Sitting     "60 - R  99 137lbs 6oz 5'  2\" 25.13 1.65 100 %    08/03/2020 01:56 /70 Sitting    57 - R  97.1 132lbs 8oz 5'  2\" 24.23 1.62 100 %    08/03/2020 02:35 /74 Sitting                     Physical Examination  · Constitutional  o Appearance  o : well-nourished, well developed, alert, in no acute distress, well-tended appearance  · Head and Face  o Head  o :   § Inspection  § : atraumatic, normocephalic  o Face  o :   § Inspection  § : no facial lesions  o HEENT  o : Unremarkable  · Eyes  o Conjunctivae  o : conjunctivae normal  o Sclerae  o : sclerae white  o Pupils and Irises  o : pupils equal and round, pupils reactive to light bilaterally  o Eyelids/Ocular Adnexae  o : eyelid appearance normal  · Ears, Nose, Mouth and Throat  o Ears  o :   § External Ears  § : appearance within normal limits, no lesions present  § Otoscopic Examination  § : tympanic membrane appearance within normal limits bilaterally without perforations, mobility normal  o Nose  o :   § External Nose  § : appearance normal  o Oral Cavity  o :   § Oral Mucosa  § : oral mucosa normal  § Lips  § : lip appearance normal  § Teeth  § : normal dentition for age  § Gums  § : gums pink, non-swollen, no bleeding present  § Tongue  § : tongue appearance normal  § Palate  § : hard palate normal, soft palate appearance normal  o Throat  o :   § Oropharynx  § : no inflammation or lesions present, tonsils within normal limits  · Neck  o Inspection/Palpation  o : normal appearance, no masses or tenderness, trachea midline  o Thyroid  o : gland size normal, nontender, no nodules or masses present on palpation  · Respiratory  o Respiratory Effort  o : breathing unlabored  o Auscultation of Lungs  o : normal breath sounds  · Cardiovascular  o Heart  o :   § Auscultation of Heart  § : regular rate, normal rhythm, grade I/VI systolic flow murmur present  o Peripheral Vascular System  o :   § Extremities  § : no edema  · Gastrointestinal  o Abdominal " Examination  o : epigastric tenderness to palpation present, normal bowel sounds, tone normal without rigidity or guarding, no masses present  o Liver and spleen  o : no hepatomegaly present  · Lymphatic  o Neck  o : no lymphadenopathy           Assessment  · Epigastric pain     789.06/R10.13  Her pain is better. She has endoscopy tomorrow am. Will continue her current meds until after that.   · Bloating     787.3/R14.0  unchanged.   · Generalized anxiety disorder     300.02/F41.1  improved      Plan  · Orders  o ACO-39: Current medications updated and reviewed () - - 08/03/2020  · Medications  o Medications have been Reconciled  o Transition of Care or Provider Policy  · Instructions  o Instructed to seek medical attention urgently for new or worsening symptoms.  o Patient is taking medications as prescribed and doing well.   o Take all medications as prescribed/directed.  o Patient instructed/educated on their diet and exercise program.  o Patient was educated/instructed on their diagnosis, treatment and medications prior to discharge from the clinic today.  o Patient instructed to seek medical attention urgently for new or worsening symptoms.  o Call the office with any concerns or questions.  o Bring all medicines with their bottles to each office visit.  · Disposition  o Call or Return if symptoms worsen or persist.  o Return Visit Request in/on 2 months +/- 2 days ([id number]).            Electronically Signed by: Magnus Lima, DO -Author on August 3, 2020 02:37:54 PM

## 2021-05-13 NOTE — PROGRESS NOTES
"   Progress Note      Patient Name: Alina Velasquez   Patient ID: [id number]   Sex: Female   Birthdate: Lacy 3, 1945    Primary Care Provider: Magnus Lima DO   Referring Provider: Eileen Gutierrez MD    Visit Date: May 5, 2020    Provider: Magnus Lima DO   Location: [facility]   Location Address: [address] [place] [address]  [id number]   Location Phone: [phone]          Chief Complaint  · Acute visit for: SOB, abdominal pain, \"reaction to medication\"      History Of Present Illness  Alina Velasquez is a 74 year old /White female who presents for evaluation and treatment of: sob. She states that she felt sob last PM, but she feels better today. Her chest felt heavy, but no palpitations or tachycardia. She has been checking her BP and it has been 112/69 - 130/80.       Past Medical History  Disease Name Date Onset Notes   Bloating 06/17/2016 --    Breast cancer 10/13/2017 --    Breast Neoplasm, Malignant 1996 Rt breast   Essential hypertension 03/17/2017 --    Generalized anxiety disorder 03/04/2019 --    Hematuria 10/30/2017 --    Herpes simplex 04/16/2018 --    Hyperlipidemia 06/17/2016 --    Osteoarthritis; multiple sites 1994 throughout whole body   Polyarthritis of multiple sites 07/13/2017 --    Subclinical hypothyroidism 06/17/2016 --    Tinnitus 07/13/2017 --    Vertigo 07/13/2017 --          Past Surgical History  Procedure Name Date Notes   Ankle surgery 1995 Lt ankle d/t broken ankle injury   Artificial Joints/Limbs --  --    Cataract exctraction with lens implant 2008 --    Colonoscopy --  --    Eye Implant --  yes   Gallbladder --  --    Hysterectomy --  Partial hysterectomy   Joint Surgery --  --    Knee replacement, right 2012 Dr. Gupta-Ortho   Laparoscopic cholecystectomy 2015 --    Mastectomy, Right 1996 Dr. Trent Sun         Medication List  Name Date Started Instructions   atenolol 50 mg oral tablet 04/15/2020 take 1 tablet (50 mg) by oral route once " daily for 90 days   benazepril 40 mg oral tablet 2019 TAKE 1 TABLET BY MOUTH ONCE DAILY   clonazepam 0.5 mg oral tablet 2019 take 1 tablet by oral route daily as needed for 30 days   fiber 2 gram oral tablet,chewable  chew 1 tablet by oral route daily for 30 days   omeprazole 40 mg oral capsule,delayed release(DR/EC) 2019 take 1 capsule (40 mg) by oral route once daily before a meal for 90 days   pravastatin 20 mg oral tablet 2019 TAKE ONE TABLET BY MOUTH ONCE DAILY AT BEDTIME   spironolactone 25 mg oral tablet 04/15/2020 take 1 tablet (25 mg) by oral route once daily for 90 days         Allergy List  Allergen Name Date Reaction Notes   I.V. Dye --  --  --          Family Medical History  Disease Name Relative/Age Notes   Heart Disease Brother/   Brother   Hypertension Brother/  Mother/   --    Diabetes, unspecified type Brother/   Brother   Family history of certain chronic disabling diseases; arthritis Mother/  Sister/   Mother; Sister   Osteoporosis Mother/  Sister/   Mother; Sister         Reproductive History  Menstrual   Age Menarche: 11   Pregnancy Summary   Total Pregnancies: 2 Full Term: 2 Premature: 0   Ab Induced: 0 Ab Spontaneous: 0 Ectopics: 0   Multiples: 0 Livin         Social History  Finding Status Start/Stop Quantity Notes   Active but no formal exercise --  --/-- --  --    Alcohol Never --/-- --  2019 - 2019 - 2019 - 10/16/2018 -    Alcohol Use Never --/-- --  does not drink   Claustophobic Unknown --/-- --  yes   Homemaker. --  --/-- --  --    lives with spouse --  --/-- --  --     --  --/-- --  --    No known infection risk --  --/-- --  --    Tobacco Never --/-- --  never smoker          Immunizations  NameDate Admin Mfg Trade Name Lot Number Route Inj VIS Given VIS Publication   Xbbagwfer55/2019 [facility] Fluzone Quadrivalent [id number] IM LD 10/01/2019    Comments: NDC 70920-471-88 Pt tolerated without complaints   Iuhnplmdm06/ [facility]  "Fluzone Quadrivalent [id number] IM LD 10/16/2018 08/07/2015   Comments: NDC:[id number]   Jthppovbj46/13/2017 [facility] Fluzone Quadrivalent [id number] IM LD 10/13/2017 08/07/2015   Comments: pt tolerated well   Bfpyeiwap12/14/2015 [facility] Fluarix, quadrivalent, preservative free 32NZ7 ID LA 10/14/2015 06/22/2015   Comments:    InfluenzaRefused 11/20/2014 NE Not Entered  NE NE     Comments: Pt refused.   Aklgsqltlgqa99/20/2014 MSD PREVNAR 13 [id number] IM RD 11/20/2014 02/27/2013   Comments: Pt tolerated well.   Prevnar 1310/16/2018 [facility] PREVNAR 13 [id number] IM LD 10/16/2018 11/05/2015   Comments: NDC:[id number]   Tdap11/20/2014 [facility] BOOSTRIX 9544Y IM LD 11/20/2014 05/09/2013   Comments: Pt tolerated well.         Review of Systems  · Constitutional  o Admits  o : fatigue  · HENT  o Denies  o : headaches  · Cardiovascular  o Admits  o : chest pain  o Denies  o : irregular heart beats, rapid heart rate, dyspnea on exertion  · Respiratory  o Admits  o : shortness of breath  o Denies  o : wheezing, cough, dyspnea on exertion  · Psychiatric  o Admits  o : depression, difficulty sleeping      Vitals  Date Time BP Position Site L\R Cuff Size HR RR TEMP (F) WT  HT  BMI kg/m2 BSA m2 O2 Sat        03/13/2020 12:14 /66 Sitting    64 - R   144lbs 6oz 5'  2\" 26.41 1.69 100 %    03/31/2020 08:56 /66 Sitting    83 - R  98.7 141lbs 2oz 5'  2\" 25.81 1.67 100 %    05/05/2020 03:30 /71 Sitting    72 - R 20 98.4 136lbs 8oz 5'  2\" 24.97 1.65 98 %    05/05/2020 04:08 /76 Sitting                     Physical Examination  · Constitutional  o Appearance  o : well-nourished, well developed, alert, no obvious deformities present  · Head and Face  o Head  o :   § Inspection  § : atraumatic, normocephalic  o Face  o :   § Inspection  § : no facial lesions  o HEENT  o : Unremarkable  · Eyes  o Conjunctivae  o : conjunctivae normal  o Sclerae  o : sclerae white  o Pupils and Irises  o : pupils equal and round, pupils reactive to light " bilaterally  o Eyelids/Ocular Adnexae  o : eyelid appearance normal  · Ears, Nose, Mouth and Throat  o Ears  o :   § External Ears  § : appearance within normal limits, no lesions present  § Otoscopic Examination  § : tympanic membrane appearance within normal limits bilaterally without perforations, mobility normal  o Nose  o :   § External Nose  § : appearance normal  o Oral Cavity  o :   § Oral Mucosa  § : oral mucosa normal  § Lips  § : lip appearance normal  § Teeth  § : normal dentition for age  § Gums  § : gums pink, non-swollen, no bleeding present  § Tongue  § : tongue appearance normal  § Palate  § : hard palate normal, soft palate appearance normal  o Throat  o :   § Oropharynx  § : no inflammation or lesions present, tonsils within normal limits  · Neck  o Inspection/Palpation  o : normal appearance, no masses or tenderness, trachea midline  o Thyroid  o : gland size normal, nontender, no nodules or masses present on palpation  · Respiratory  o Respiratory Effort  o : breathing unlabored  o Auscultation of Lungs  o : normal breath sounds  · Cardiovascular  o Heart  o :   § Auscultation of Heart  § : regular rate, normal rhythm, no murmurs present  · Lymphatic  o Neck  o : no lymphadenopathy   · Psychiatric  o Mood and Affect  o : anxiety, affect appropriate          Assessment  · Chest pain     786.50/R07.9  She has chest heaviness and sob. This is probably anxiety related. SHe has NOT had any cardiac testing in the past 6-7 years.   · Essential hypertension     401.9/I10  Her initial BP was elevated, but upon re-check it was normal  · Bloating     787.3/R14.0  he has been taking her Omeprazole daily. She also does bulk fiber which may be causing her bloating. She had a gastric emptying study 3 years ago for the same thing and it was normal. Her symptoms may be IBS  · Generalized anxiety disorder     300.02/F41.1  She appears rather anxious.       Plan  · Orders  o ACO-39: Current medications updated and  reviewed () - - 05/05/2020  o Dual Isotope Treadmill Stress Test [facility] ([id number], [id number]) - 786.50/R07.9 - 05/05/2020  · Medications  o citalopram 20 mg oral tablet   SIG: take 1 tablet (20 mg) by oral route once daily in the evening for 30 days   DISP: (30) tablets with 1 refills  Prescribed on 05/05/2020     o Medications have been Reconciled  o Transition of Care or Provider Policy  · Instructions  o Patient advised to monitor blood pressure (B/P) at home and journal readings. Patient informed that a B/P reading at home of more than 130/80 is considered hypertension. For readings greater lvml749/90 or higher patient is advised to follow up in the office with readings for management. Patient advised to limit sodium intake.  o Patient is taking medications as prescribed and doing well.   o Take all medications as prescribed/directed.  o Patient instructed/educated on their diet and exercise program.  o Patient was educated/instructed on their diagnosis, treatment and medications prior to discharge from the clinic today.  o Patient instructed to seek medical attention urgently for new or worsening symptoms.  o Call the office with any concerns or questions.  o Bring all medicines with their bottles to each office visit.  · Disposition  o Call or Return if symptoms worsen or persist.  o Return Visit Request in/on 4 weeks +/- 2 days ([id number]).            Electronically Signed by: Magnus Lima DO -Author on May 5, 2020 04:18:26 PM

## 2021-05-14 VITALS
HEIGHT: 62 IN | DIASTOLIC BLOOD PRESSURE: 68 MMHG | OXYGEN SATURATION: 100 % | RESPIRATION RATE: 12 BRPM | BODY MASS INDEX: 24.48 KG/M2 | HEART RATE: 64 BPM | SYSTOLIC BLOOD PRESSURE: 207 MMHG | WEIGHT: 133.06 LBS

## 2021-05-14 VITALS
WEIGHT: 133.37 LBS | HEART RATE: 63 BPM | BODY MASS INDEX: 24.54 KG/M2 | HEIGHT: 62 IN | DIASTOLIC BLOOD PRESSURE: 58 MMHG | TEMPERATURE: 97.5 F | OXYGEN SATURATION: 100 % | SYSTOLIC BLOOD PRESSURE: 148 MMHG

## 2021-05-14 VITALS
OXYGEN SATURATION: 100 % | HEART RATE: 65 BPM | DIASTOLIC BLOOD PRESSURE: 78 MMHG | HEIGHT: 62 IN | SYSTOLIC BLOOD PRESSURE: 182 MMHG | WEIGHT: 132.12 LBS | TEMPERATURE: 98.9 F | BODY MASS INDEX: 24.31 KG/M2

## 2021-05-14 VITALS
OXYGEN SATURATION: 100 % | WEIGHT: 131.5 LBS | SYSTOLIC BLOOD PRESSURE: 153 MMHG | HEART RATE: 67 BPM | DIASTOLIC BLOOD PRESSURE: 71 MMHG | HEIGHT: 62 IN | TEMPERATURE: 96.8 F | BODY MASS INDEX: 24.2 KG/M2

## 2021-05-14 NOTE — PROGRESS NOTES
Progress Note      Patient Name: Alina Velasquez   Patient ID: [id number]   Sex: Female   Birthdate: Lacy 3, 1945    Primary Care Provider: Magnus Lima DO    Visit Date: February 19, 2021    Provider: Magnus Lima DO   Location: [facility]   Location Address: [address] [place] [address]  [id number]   Location Phone: [phone]          Chief Complaint  · Follow up - HTN, GERD, Anxiety, bloating, Subclinical Hypothyroidism      History Of Present Illness  Alina Velasquez is a 75 year old /White female who presents for evaluation and treatment of: HTN, HLD, and GERD.      HTN- SHe states that she has been checking her BP occasionally and it has been good. She is taking her meds daily      HLD- She is taking her Pravastatin daily.      GERD- Her reflux is well controlled. She takes her Omeprazole every other day       Past Medical History  Disease Name Date Onset Notes   Bloating 06/17/2016 --    Breast cancer 10/13/2017 --    Breast Neoplasm, Malignant 1996 Rt breast   Essential hypertension 03/17/2017 --    Generalized anxiety disorder 03/04/2019 --    Hematuria 10/30/2017 --    Herpes simplex 04/16/2018 --    Hyperlipidemia 06/17/2016 --    Osteoarthritis; multiple sites 1994 throughout whole body   Polyarthritis of multiple sites 07/13/2017 --    Subclinical hypothyroidism 06/17/2016 --    Tinnitus 07/13/2017 --    Vertigo 07/13/2017 --          Past Surgical History  Procedure Name Date Notes   Ankle surgery 1995 Lt ankle d/t broken ankle injury   Artificial Joints/Limbs --  --    Cataract exctraction with lens implant 2008 --    Colonoscopy 2013 2016 2020 2021 --    EGD 2013 2020 --    Eye Implant --  yes   Hysterectomy --  Partial hysterectomy   Joint Surgery --  --    Knee replacement, right 2012 Dr. Gupta-Ortho   Laparoscopic cholecystectomy 2015 --    Mastectomy, Right 1996 Dr. Trent Sun         Medication List  Name Date Started Instructions    atenolol 50 mg oral tablet 2020 take 1 tablet (50 mg) by oral route 2 times per day for 90 days   benazepril 40 mg oral tablet 2020 TAKE 1 TABLET BY MOUTH ONCE DAILY   clonazepam 0.5 mg oral tablet 2020 TAKE 1 TABLET BY MOUTH ONCE DAILY IN THE MORNING   omeprazole 40 mg oral capsule,delayed release(DR/EC) 2019 take 1 capsule (40 mg) by oral route once daily before a meal for 90 days   pravastatin 20 mg oral tablet 2020 TAKE 1 TABLET BY MOUTH ONCE DAILY AT BEDTIME   spironolactone 25 mg oral tablet 2021 TAKE 1 TABLET BY MOUTH ONCE DAILY         Allergy List  Allergen Name Date Reaction Notes   I.V. Dye --  --  --          Family Medical History  Disease Name Relative/Age Notes   Heart Disease Brother/   Brother   Hypertension Brother/  Mother/   --    Diabetes, unspecified type Brother/   Brother   - No Family History of Colorectal Cancer  --    Family history of certain chronic disabling diseases; arthritis Mother/  Sister/   Mother; Sister   Osteoporosis Mother/  Sister/   Mother; Sister         Reproductive History  Menstrual   Age Menarche: 11   Pregnancy Summary   Total Pregnancies: 2 Full Term: 2 Premature: 0   Ab Induced: 0 Ab Spontaneous: 0 Ectopics: 0   Multiples: 0 Livin         Social History  Finding Status Start/Stop Quantity Notes   Active but no formal exercise --  --/-- --  --    Alcohol Never --/-- --  2019 - 2019 - 2019 - 10/16/2018 -    Alcohol Use Never --/-- --  does not drink   Claustophobic Unknown --/-- --  yes   Homemaker. --  --/-- --  --    lives with spouse --  --/-- --  --     --  --/-- --  --    No known infection risk --  --/-- --  --    Tobacco Never --/-- --  never smoker          Immunizations  NameDate Admin Mfg Trade Name Lot Number Route Inj VIS Given VIS Publication   Vnqmtsrbe56/2020 SKB Fluarix, quadrivalent, preservative free [id number] IM LD 10/06/2020 08/15/2019   Comments: PT TOLERATED WELL NDC 43293-121-45  "  Hbnskzfdzwor71/20/2014 MSD PREVNAR 13 [id number] IM RD 11/20/2014 02/27/2013   Comments: Pt tolerated well.   Prevnar 1310/16/2018 WAL PREVNAR 13 [id number] IM LD 10/16/2018 11/05/2015   Comments: NDC:[id number]   Tdap11/20/2014 SKB BOOSTRIX [id number] IM LD 11/20/2014 05/09/2013   Comments: Pt tolerated well.         Review of Systems  · Constitutional  o Denies  o : fatigue  · Eyes  o Denies  o : blurred vision, changes in vision  · HENT  o Denies  o : headaches  · Cardiovascular  o Denies  o : chest pain, irregular heart beats, rapid heart rate  · Respiratory  o Denies  o : shortness of breath, wheezing, cough  · Gastrointestinal  o Denies  o : nausea, vomiting, diarrhea, constipation, heartburn, reflux      Vitals  Date Time BP Position Site L\R Cuff Size HR RR TEMP (F) WT  HT  BMI kg/m2 BSA m2 O2 Sat FR L/min FiO2 HC       09/17/2020 01:30 /78 Sitting    65 - R  98.9 132lbs 2oz 5'  2\" 24.17 1.62 100 %  21%    10/06/2020 10:06 /71 Sitting    67 - R  96.8 131lbs 8oz 5'  2\" 24.05 1.62 100 %  21%    10/06/2020 10:41 /72 Sitting                 02/19/2021 11:58 /58 Sitting    63 - R  97.5 133lbs 6oz 5'  2\" 24.39 1.63 100 %  21%          Physical Examination  · Constitutional  o Appearance  o : well-nourished, well developed, alert, in no acute distress, well-tended appearance  · Head and Face  o Head  o :   § Inspection  § : atraumatic, normocephalic  o Face  o :   § Inspection  § : no facial lesions  o HEENT  o : Unremarkable  · Eyes  o Conjunctivae  o : conjunctivae normal  o Sclerae  o : sclerae white  o Pupils and Irises  o : pupils equal and round, pupils reactive to light bilaterally  o Eyelids/Ocular Adnexae  o : eyelid appearance normal  · Ears, Nose, Mouth and Throat  o Ears  o :   § External Ears  § : appearance within normal limits, no lesions present  § Otoscopic Examination  § : tympanic membrane appearance within normal limits bilaterally without perforations, mobility normal  o Nose  o : "   § External Nose  § : appearance normal  o Oral Cavity  o :   § Oral Mucosa  § : oral mucosa normal  § Lips  § : lip appearance normal  § Teeth  § : normal dentition for age  § Gums  § : gums pink, non-swollen, no bleeding present  § Tongue  § : tongue appearance normal  § Palate  § : hard palate normal, soft palate appearance normal  o Throat  o :   § Oropharynx  § : no inflammation or lesions present, tonsils within normal limits  · Neck  o Inspection/Palpation  o : normal appearance, no masses or tenderness, trachea midline  o Thyroid  o : gland size normal, nontender, no nodules or masses present on palpation  · Respiratory  o Respiratory Effort  o : breathing unlabored  o Auscultation of Lungs  o : normal breath sounds  · Cardiovascular  o Heart  o :   § Auscultation of Heart  § : regular rate, normal rhythm, no murmurs present  o Peripheral Vascular System  o :   § Extremities  § : no edema  · Lymphatic  o Neck  o : no lymphadenopathy           Assessment  · Essential hypertension     401.9/I10  adequate control  · GERD (gastroesophageal reflux disease)     530.81/K21.9  controlled  · Hyperlipidemia     272.4/E78.5  stable  · Generalized anxiety disorder     300.02/F41.1  stable      Plan  · Orders  o ACO-39: Current medications updated and reviewed (, 1159F) - - 02/19/2021  · Medications  o Medications have been Reconciled  o Transition of Care or Provider Policy  · Instructions  o Patient was educated and given low cholesterol diet information.  o Recommended exercise program to assist with cholesterol, weight loss and overall health improvement.  o Advised that cheeses and other sources of dairy fats, animal fats, fast food, and the extras (candy, pastries, pies, doughnuts and cookies) all contain LDL raising nutrients. Advised to increase fruits, vegetables, whole grains, and to monitor portion sizes.   o Patient is taking medications as prescribed and doing well.   o Take all medications as  prescribed/directed.  o Patient instructed/educated on their diet and exercise program.  o Patient was educated/instructed on their diagnosis, treatment and medications prior to discharge from the clinic today.  o Patient instructed to seek medical attention urgently for new or worsening symptoms.  o Call the office with any concerns or questions.  o Bring all medicines with their bottles to each office visit.  · Disposition  o Call or Return if symptoms worsen or persist.  o Return Visit Request in/on 6 months +/- 2 days ([id number]).            Electronically Signed by: Magnus Lima DO -Author on February 19, 2021 12:58:04 PM

## 2021-05-15 VITALS
HEART RATE: 62 BPM | TEMPERATURE: 97.4 F | BODY MASS INDEX: 26.93 KG/M2 | WEIGHT: 146.37 LBS | SYSTOLIC BLOOD PRESSURE: 134 MMHG | DIASTOLIC BLOOD PRESSURE: 55 MMHG | OXYGEN SATURATION: 100 % | HEIGHT: 62 IN

## 2021-05-15 VITALS
WEIGHT: 132.5 LBS | OXYGEN SATURATION: 100 % | HEART RATE: 57 BPM | SYSTOLIC BLOOD PRESSURE: 177 MMHG | TEMPERATURE: 97.1 F | BODY MASS INDEX: 24.38 KG/M2 | SYSTOLIC BLOOD PRESSURE: 138 MMHG | HEIGHT: 62 IN | DIASTOLIC BLOOD PRESSURE: 74 MMHG | DIASTOLIC BLOOD PRESSURE: 70 MMHG

## 2021-05-15 VITALS
BODY MASS INDEX: 26.75 KG/M2 | HEIGHT: 62 IN | WEIGHT: 145.37 LBS | SYSTOLIC BLOOD PRESSURE: 122 MMHG | DIASTOLIC BLOOD PRESSURE: 82 MMHG

## 2021-05-15 VITALS
WEIGHT: 145 LBS | SYSTOLIC BLOOD PRESSURE: 118 MMHG | HEART RATE: 67 BPM | OXYGEN SATURATION: 97 % | RESPIRATION RATE: 30 BRPM | BODY MASS INDEX: 26.68 KG/M2 | TEMPERATURE: 98.1 F | DIASTOLIC BLOOD PRESSURE: 46 MMHG | HEIGHT: 62 IN

## 2021-05-15 VITALS
HEART RATE: 64 BPM | OXYGEN SATURATION: 100 % | DIASTOLIC BLOOD PRESSURE: 66 MMHG | HEIGHT: 62 IN | BODY MASS INDEX: 26.57 KG/M2 | WEIGHT: 144.37 LBS | SYSTOLIC BLOOD PRESSURE: 133 MMHG

## 2021-05-15 VITALS
SYSTOLIC BLOOD PRESSURE: 180 MMHG | DIASTOLIC BLOOD PRESSURE: 76 MMHG | RESPIRATION RATE: 20 BRPM | HEIGHT: 62 IN | TEMPERATURE: 98.4 F | HEART RATE: 72 BPM | SYSTOLIC BLOOD PRESSURE: 138 MMHG | WEIGHT: 136.5 LBS | BODY MASS INDEX: 25.12 KG/M2 | DIASTOLIC BLOOD PRESSURE: 71 MMHG | OXYGEN SATURATION: 98 %

## 2021-05-15 VITALS — RESPIRATION RATE: 14 BRPM | HEIGHT: 62 IN | BODY MASS INDEX: 26.15 KG/M2 | WEIGHT: 142.12 LBS

## 2021-05-15 VITALS — HEIGHT: 62 IN | RESPIRATION RATE: 14 BRPM | BODY MASS INDEX: 26.5 KG/M2 | WEIGHT: 144 LBS

## 2021-05-15 VITALS
OXYGEN SATURATION: 100 % | SYSTOLIC BLOOD PRESSURE: 109 MMHG | WEIGHT: 147.37 LBS | HEART RATE: 67 BPM | DIASTOLIC BLOOD PRESSURE: 55 MMHG | BODY MASS INDEX: 27.12 KG/M2 | HEIGHT: 62 IN

## 2021-05-15 VITALS
DIASTOLIC BLOOD PRESSURE: 66 MMHG | SYSTOLIC BLOOD PRESSURE: 124 MMHG | WEIGHT: 141.12 LBS | HEART RATE: 83 BPM | OXYGEN SATURATION: 100 % | TEMPERATURE: 98.7 F | BODY MASS INDEX: 25.97 KG/M2 | HEIGHT: 62 IN

## 2021-05-15 VITALS — HEIGHT: 62 IN | WEIGHT: 144 LBS | BODY MASS INDEX: 26.5 KG/M2 | RESPIRATION RATE: 16 BRPM

## 2021-05-15 VITALS
HEIGHT: 62 IN | SYSTOLIC BLOOD PRESSURE: 130 MMHG | DIASTOLIC BLOOD PRESSURE: 57 MMHG | BODY MASS INDEX: 26.68 KG/M2 | WEIGHT: 145 LBS | HEART RATE: 69 BPM

## 2021-05-15 VITALS
SYSTOLIC BLOOD PRESSURE: 149 MMHG | HEART RATE: 60 BPM | WEIGHT: 137.37 LBS | DIASTOLIC BLOOD PRESSURE: 58 MMHG | HEIGHT: 62 IN | OXYGEN SATURATION: 100 % | TEMPERATURE: 99 F | BODY MASS INDEX: 25.28 KG/M2

## 2021-05-15 VITALS — HEIGHT: 62 IN | RESPIRATION RATE: 14 BRPM | WEIGHT: 143 LBS | BODY MASS INDEX: 26.31 KG/M2

## 2021-05-15 VITALS
BODY MASS INDEX: 26.78 KG/M2 | OXYGEN SATURATION: 97 % | SYSTOLIC BLOOD PRESSURE: 100 MMHG | TEMPERATURE: 98.1 F | DIASTOLIC BLOOD PRESSURE: 59 MMHG | WEIGHT: 145.5 LBS | HEIGHT: 62 IN | HEART RATE: 73 BPM

## 2021-05-15 VITALS — RESPIRATION RATE: 16 BRPM | WEIGHT: 143 LBS | HEIGHT: 62 IN | BODY MASS INDEX: 26.31 KG/M2

## 2021-05-15 VITALS
SYSTOLIC BLOOD PRESSURE: 121 MMHG | HEART RATE: 74 BPM | BODY MASS INDEX: 26.5 KG/M2 | HEIGHT: 62 IN | DIASTOLIC BLOOD PRESSURE: 61 MMHG | WEIGHT: 144 LBS

## 2021-05-15 VITALS — BODY MASS INDEX: 26.87 KG/M2 | HEIGHT: 62 IN | WEIGHT: 146 LBS | RESPIRATION RATE: 12 BRPM

## 2021-05-15 VITALS — HEIGHT: 62 IN | WEIGHT: 143 LBS | BODY MASS INDEX: 26.31 KG/M2 | RESPIRATION RATE: 14 BRPM

## 2021-05-16 VITALS
HEIGHT: 62 IN | DIASTOLIC BLOOD PRESSURE: 60 MMHG | BODY MASS INDEX: 26.87 KG/M2 | SYSTOLIC BLOOD PRESSURE: 134 MMHG | WEIGHT: 146 LBS | TEMPERATURE: 98.1 F | HEART RATE: 76 BPM | OXYGEN SATURATION: 98 % | RESPIRATION RATE: 23 BRPM

## 2021-05-16 VITALS
OXYGEN SATURATION: 99 % | DIASTOLIC BLOOD PRESSURE: 62 MMHG | BODY MASS INDEX: 25.95 KG/M2 | WEIGHT: 141 LBS | HEIGHT: 62 IN | TEMPERATURE: 99.2 F | HEART RATE: 78 BPM | SYSTOLIC BLOOD PRESSURE: 125 MMHG

## 2021-05-16 VITALS
TEMPERATURE: 98.6 F | BODY MASS INDEX: 26.58 KG/M2 | HEART RATE: 70 BPM | DIASTOLIC BLOOD PRESSURE: 49 MMHG | SYSTOLIC BLOOD PRESSURE: 138 MMHG | HEIGHT: 63 IN | RESPIRATION RATE: 12 BRPM | OXYGEN SATURATION: 97 % | WEIGHT: 150 LBS

## 2021-05-16 VITALS
OXYGEN SATURATION: 99 % | DIASTOLIC BLOOD PRESSURE: 76 MMHG | BODY MASS INDEX: 26.31 KG/M2 | HEIGHT: 62 IN | HEART RATE: 70 BPM | SYSTOLIC BLOOD PRESSURE: 116 MMHG | TEMPERATURE: 97.8 F | WEIGHT: 143 LBS

## 2021-05-16 VITALS
WEIGHT: 149.37 LBS | BODY MASS INDEX: 27.49 KG/M2 | SYSTOLIC BLOOD PRESSURE: 111 MMHG | OXYGEN SATURATION: 96 % | TEMPERATURE: 97.5 F | DIASTOLIC BLOOD PRESSURE: 49 MMHG | HEART RATE: 70 BPM | HEIGHT: 62 IN

## 2021-05-16 VITALS
DIASTOLIC BLOOD PRESSURE: 57 MMHG | HEIGHT: 63 IN | HEART RATE: 67 BPM | SYSTOLIC BLOOD PRESSURE: 123 MMHG | BODY MASS INDEX: 25.87 KG/M2 | WEIGHT: 146 LBS

## 2021-05-16 VITALS
BODY MASS INDEX: 27.05 KG/M2 | OXYGEN SATURATION: 98 % | WEIGHT: 147 LBS | DIASTOLIC BLOOD PRESSURE: 56 MMHG | SYSTOLIC BLOOD PRESSURE: 118 MMHG | HEIGHT: 62 IN | TEMPERATURE: 97.4 F | HEART RATE: 78 BPM

## 2021-05-22 ENCOUNTER — TRANSCRIBE ORDERS (OUTPATIENT)
Dept: ONCOLOGY | Facility: HOSPITAL | Age: 76
End: 2021-05-22

## 2021-05-22 DIAGNOSIS — Z12.31 SCREENING MAMMOGRAM FOR HIGH-RISK PATIENT: Primary | ICD-10-CM

## 2021-05-28 VITALS
HEART RATE: 59 BPM | TEMPERATURE: 97.1 F | TEMPERATURE: 98.1 F | BODY MASS INDEX: 27.02 KG/M2 | WEIGHT: 146.83 LBS | OXYGEN SATURATION: 100 % | RESPIRATION RATE: 16 BRPM | HEART RATE: 66 BPM | WEIGHT: 132.5 LBS | BODY MASS INDEX: 24.23 KG/M2 | DIASTOLIC BLOOD PRESSURE: 53 MMHG | DIASTOLIC BLOOD PRESSURE: 55 MMHG | RESPIRATION RATE: 16 BRPM | SYSTOLIC BLOOD PRESSURE: 137 MMHG | SYSTOLIC BLOOD PRESSURE: 153 MMHG | HEIGHT: 62 IN | OXYGEN SATURATION: 97 %

## 2021-05-28 VITALS
HEART RATE: 61 BPM | TEMPERATURE: 96.3 F | WEIGHT: 132.28 LBS | DIASTOLIC BLOOD PRESSURE: 62 MMHG | BODY MASS INDEX: 24.19 KG/M2 | OXYGEN SATURATION: 99 % | SYSTOLIC BLOOD PRESSURE: 129 MMHG | RESPIRATION RATE: 16 BRPM

## 2021-05-28 NOTE — PROGRESS NOTES
Patient: ELIOT DOMINIQUE     Acct: [id number]     Report: #[id number]  UNIT #: [id number]     : 1945    Encounter Date:2020  PRIMARY CARE: NATHALIA ADAME  ***Signed***  --------------------------------------------------------------------------------------------------------------------  NURSE INTAKE      Visit Type      Established Patient Visit            Chief Complaint      BREAST CA F/U      Intent of Therapy:  Curative            Referring Provider/Copies To      Referring Provider:  NATHALIA ADAME      Primary Care Provider:  NATHALIA ADAME      Copies To:   NATHALIA ADAME            History and Present Illness      Past Oncology Illness History      Ms. Dominique is a 74yo WF dx with right breast ca in .  Pt underwent right     radical mastectomy with lymph node dissection on 96.  Pathology: diffusely     infiltrating poorly differentiated ductal carcinoma with 4 of 24 lymph nodes +     for metastases.  Superior and deep margins were negative.  Per pt and     documentation states pt completed adj chemotherapy followed by radiation and     then completed 5yrs of Tamoxifen therapy.            HPI - Oncology Interim      Patient presents for ongoing follow-up of her right breast cancer.  She is 25     years out from her initial right radical mastectomy.  Status post treatments as     outlined.  On return today, she states that she has been doing well from the     breast standpoint.  She had a small cyst excised from the mastectomy scar last     year but the pathology was benign.  She denies any issues from her left breast.     She had mammogram earlier this year.  She denies any masses lymphadenopathy.  No    unusual aches or pains.  She is having issues with her stomach and reports that     she had a colonoscopy earlier this week.  She has follow-up already scheduled.      She has no other complaints.            Cancer Details            Right breast-poorly differentiated invasive  ductal carcinoma            Clinical Staging      Stage III            Treatments      Chemotherapy      1997 completed adj chemotherapy      Radiation Therapy      1997 completed adj XRT rt chest wall/axilla            Clinical Trial Participant      No            ECOG Performance Status      0            PAST, FAMILY   Past Medical History      Past Medical History:  Arthritis, High Cholesterol, Hypertension      Other PMH:        8/2020 EGD      Hematology/Oncology (F):  Breast Cancer            Past Surgical History      Cholecystectomy, Hysterectomy, Joint Replacement, Mastectomy Right            Family History            son and daughter healthy. no cancer in the family of which she is aware.            Social History      Marital Status:        Lives independently:  Yes      Number of Children:  2      Occupation:  RETIRED            Tobacco Use      Tobacco status:  Never smoker            Alcohol Use      Alcohol intake:  None            Substance Use      Substance use:  Denies use            REVIEW OF SYSTEMS      General:  Admits: Fatigue      Eye:  Admits Corrective Lenses      ENT:  Denies Sore Throat      Respiratory:  Denies: Cough, Shortness of Air      Musculoskeletal:  Denies Muscle Pain, Denies Painful Joints      Neurologic:  Denies Dizziness, Denies Numbness\Tingling      Reproductive:  Denies: Still Menstruating            VITAL SIGNS AND SCORES      Vitals      Weight 132 lbs 7.943 oz / 60.1 kg      Temperature 98.1 F / 36.72 C - Temporal      Pulse 59      Respirations 16      Blood Pressure 153/55 Sitting, Left Arm      Pulse Oximetry 100%, rm air            Pain Score      Pain Scale Used:  Numerical      Pain Intensity:  0            Fatigue Score      Fatigue (0-10 scale):  1            EXAM      General Appearance:  Positive for: Alert, Cooperative;          Negative for: Acute Distress      Neck:  Positive for: Supple;          Negative for: JVD, Masses      Respiratory:   Positive for: CTAB, Normal Respiratory Effort      Breast - Left:  Positive for: Appearance (Well-healed mastectomy incision     without evidence of intra-scar recurrence, masses or axillary adenopathy);          Negative for: Adenopathy      Breast - Right:  Positive for: Appearance (Normal-appearing female breast);          Negative for: Adenopathy      Abdomen/Gastro:  Positive for: Normal Active Bowel Sounds, Soft;          Negative for: Distention, Hepatosplenomegaly, Tenderness      Cardiovascular:  Positive for: RRR;          Negative for: Gallop, Murmur, Peripheral Edema, Rub      Psychiatric:  Positive for: Appropriate Affect, Intact Judgement      Lymphatic:  Negative for: Axillary, Cervical, Infraclavicular, Supraclavicular            PREVENTION      Hx Influenza Vaccination:  Yes      Date Influenza Vaccine Given:  Sep 1, 2019      2 or More Falls in Past Year?:  No      Fall Past Year with Injury?:  No      Hx Pneumococcal Vaccination:  Yes      Encouraged to follow-up with:  PCP regarding preventative exams.      Chart initiated by      PIOTR KEITH MA            ALLERGY/MEDS      Allergies      Coded Allergies:             IODINATED CONTRAST MEDIA (Verified  Allergy, Unknown, BURNING IN CHEST,     8/6/20)            Medications      Last Reconciled on 8/6/20 11:50 by JEZ HASSAN      Atenolol (ATENOLOL) 25 Mg Tablet      50 MG PO HS, #60 TAB 0 Refills         Reported         8/6/20       Dicyclomine Hcl (DICYCLOMINE HCL) 10 Mg Capsule      10 MG PO TID, CAP         Reported         8/3/20       Sucralfate (Sucralfate) 1 Gm Tablet      1 GM PO QIDAC, TAB         Reported         8/3/20       Spironolactone (Spironolactone*) 25 Mg Tablet      25 MG PO QDAY, #30 TAB 0 Refills         Reported         8/3/20       Omeprazole (priLOSEC) 20 Mg Capsule.dr      20 MG PO QDAY PRN for HEARTBURN, #30 CAP 0 Refills         Reported         8/15/19       clonazePAM (clonazePAM) 0.5 Mg Tablet      0.25 MG PO  QDAY PRN for ANXIETY, #30 TAB 0 Refills         Reported         8/8/19       Eliazar-Fluticasone (Flonase*) 16 Gm Naspr      1 PUFF NA PRN         Reported         9/26/12       Pravastatin Sodium (Pravachol*) 20 Mg Tablet      20 MG PO HS         Reported         9/26/12       Benazepril HCl (Benazepril HCl) 40 Mg Tab      40 MG PO QAM         Reported         9/26/12      Medications Reviewed:  No Changes made to meds            IMPRESSION/PLAN      Diagnosis      Breast cancer, right         Malignant neoplasm of right breast in female, estrogen receptor positive,        unspecified site of breast         Breast location: unspecified site of breast         Estrogen receptor status: positive         Patient sex: female      Patient is 25 years out from her diagnosis and treatment.  I see no evidence of     disease recurrence per history, physical examination or recent mammogram.  I     will plan to see her back in February of next year with mammogram prior or based    on symptoms.            Notes      New Medications      * ATENOLOL 25 MG TABLET: 50 MG PO HS #60      New Diagnostics      * Screening Mammo Left, 08/04/21         Dx: Breast cancer - C50.919            Patient Education            Aerobic Exercise      Patient Education Provided:  Yes            Electronically signed by JEZ HASSAN  08/06/2020 11:50       Disclaimer: Converted document may not contain table formatting or lab diagrams. Please see [employer] for the authenticated document.

## 2021-05-28 NOTE — PROGRESS NOTES
Patient: ELIOT DOMINIQUE     Acct: [id number]     Report: #[id number]  UNIT #: [id number]     : 1945    Encounter Date:2021  PRIMARY CARE: NATHALIA ADAME  ***Signed***  --------------------------------------------------------------------------------------------------------------------  NURSE INTAKE      Visit Type      Established Patient Visit            Chief Complaint      BREAST CA F/U      Intent of Therapy:  Curative            Referring Provider/Copies To      Primary Care Provider:  MANUEL MIR      Copies To:   MANUEL MIR            History and Present Illness      Past Oncology Illness History      Patient presents for ongoing follow-up of her right breast cancer.  She is 26     years out from her initial right radical mastectomy. Ms. Dominique is a 76yo WF dx    with right breast ca in .  Pt underwent right radical mastectomy with lymph     node dissection on 96.  Pathology: diffusely infiltrating poorly     differentiated ductal carcinoma with 4 of 24 lymph nodes + for metastases.      Superior and deep margins were negative.  Per pt and documentation states pt     completed adj chemotherapy followed by radiation and then completed 5yrs of     Tamoxifen therapy.            HPI - Oncology Interim      Patient returns for follow-up of breast cancer.  She is status post right     mastectomy .  She has not had any trouble since that time.  She does routine    mammography which she had in February of this year.  She denies any masses or     adenopathy.  No unusual aches or pains.  She does report occasional numbness in     her feet and is working with her primary care provider to further evaluate that     issue.  She reports good appetite and energy level.            Cancer Details            Right breast-poorly differentiated invasive ductal carcinoma            Clinical Staging      Stage III            Treatments      Chemotherapy       completed adj chemotherapy       Radiation Therapy      1997 completed adj XRT rt chest wall/axilla            Clinical Trial Participant      No            ECOG Performance Status      0            Most Recent Imaging Findings      Patient: ELIOT DOMINIQUE   Acct: #[id number]   Report: #[id number]            UNIT #: [id number]    DOS: 21 1100      INSURANCE:MEDICARE PART A   LOCATION:[facility]     : 1945            PROVIDERS      ADMITTING:     ATTENDING: JEZ HASSAN      FAMILY:  NONE,MD   ORDERING:  JEZ HASSAN         OTHER:    DICTATING:  YANA LR MD            REQ #:[id number]   EXAM:[id number] - DIG SCREENING SULTANA LEFT w OLIVIER      REASON FOR EXAM:        REASON FOR VISIT:  SCREEN            *******Signed******         PROCEDURE:   DIGITAL SCREENING LEFT MAMOGRAM WITH 3D TOMOSYNTHESIS             COMPARISON:   [facility], MG, DIG SCREENING SULTANA LEFT W     OLIVIER, 2017, 17:07.        [facility], MG, DIG SCREENING SULTANA LEFT W OLIVIER, 2019,    11:43.  [facility], MR, BREAST BILATERAL W/WO CONTRAST, 2019, 14:45.      [facility], MG, DIG SCREENING SULTANA LEFT W OLIVIER, 2020, 8:56.             VIEWS:  Left CC AND MLO VIEWS WERE OBTAINED UTILIZING 3D TOMOSYNTHESIS AND R2     CAD SOFTWARE             INDICATIONS:   SCREENING.  Personal history of right-sided breast cancer status     post mastectomy.             FINDINGS:             No suspicious mass, area of architectural distortion or suspicious     microcalcification is       identified.              CONCLUSION:             Benign mammogram. Suggest routine mammographic screening.             RECOMMENDATION(S):          ROUTINE MAMMOGRAM AND CLINICAL EVALUATION IN 12 MONTHS.               BIRADS:          DIAGNOSTIC CATEGORY 1--NEGATIVE.               BREAST COMPOSITION:   Scattered areas fibroglandular density.             PLEASE NOTE:  A NORMAL MAMMOGRAM DOES NOT  EXCLUDE THE POSSIBILITY OF BREAST     CANCER.       ANY CLINICALLY SUSPICIOUS PALPABLE LUMP SHOULD BE BIOPSIED.                YANA LR MD             Electronically Signed and Approved By: YANA LR MD on 2/04/2021 at 11:37                                  Until signed, this is an unconfirmed preliminary report that may contain      errors and is subject to change.                                              BATB1:      D:02/04/21 1137            PAST, FAMILY   Past Medical History      Past Medical History:  Arthritis, High Cholesterol, Hypertension      Other PMH:        8/2020 EGD      Hematology/Oncology (F):  Breast Cancer            Past Surgical History      Cholecystectomy, Hysterectomy, Joint Replacement, Mastectomy Right            Family History            son and daughter healthy. no cancer in the family of which she is aware.            Social History      Lives independently:  Yes      Number of Children:  2      Occupation:  RETIRED            Tobacco Use      Tobacco status:  Never smoker            Substance Use      Substance use:  Denies use            REVIEW OF SYSTEMS      General:  Admits: Fatigue      Eye:  Admits Corrective Lenses      ENT:  Admits Hearing Loss      Cardiovascular:  Denies Chest Pain      Respiratory:  Denies: Productive Cough, Shortness of Air      Gastrointestinal:  Denies Diarrhea      Musculoskeletal:  Admits Painful Joints      Neurologic:  Denies Seizures      Hematologic/Lymphatic:  Denies Enlarged Lymph Nodes            VITAL SIGNS AND SCORES      Vitals      Weight 132 lbs 4.416 oz / 60 kg      Temperature 96.3 F / 35.72 C - Temporal      Pulse 61      Respirations 16      Blood Pressure 129/62 Sitting, Left Arm      Pulse Oximetry 99%, RM AIR            Pain Score      Pain Scale Used:  Numerical      Pain Intensity:  0            Fatigue Score      Fatigue (0-10 scale):  3            EXAM      General Appearance:  Positive for: Alert, Cooperative;           Negative for: Acute Distress      Eye:  Positive for: Anicteric Sclerae, Moist Conjunctiva      Respiratory:  Positive for: CTAB, Normal Respiratory Effort      Breast - Left:  Positive for: Appearance (Normal-appearing female breast);          Negative for: Adenopathy      Breast - Right:  Positive for: Appearance ( well-healed mastectomy incision     without evidence of intra scar recurrence, masses or axillary adenopathy);          Negative for: Adenopathy      Abdomen/Gastro:  Positive for: Normal Active Bowel Sounds, Soft;          Negative for: Hepatosplenomegaly, Tenderness      Cardiovascular:  Positive for: RRR;          Negative for: Gallop, Murmur, Peripheral Edema, Rub      Psychiatric:  Positive for: Appropriate Affect, Intact Judgement      Lymphatic:  Negative for: Axillary, Cervical, Infraclavicular, Supraclavicular            PREVENTION      Date Influenza Vaccine Given:  Sep 1, 2019      2 or More Falls in Past Year?:  No      Fall Past Year with Injury?:  No      Encouraged to follow-up with:  PCP regarding preventative exams.      Chart initiated by      PIOTR KEITH MA            ALLERGY/MEDS      Allergies      Coded Allergies:             IODINATED CONTRAST MEDIA (Verified  Allergy, Severe, BURNING IN CHEST,     4/1/21)            Medications      Last Reconciled on 4/1/21 14:46 by JEZ HASSAN      clonazePAM (clonazePAM) 0.5 Mg Tablet      0.5 MG PO QDAY PRN for ANXIETY, #30 TAB 0 Refills         Reported         2/5/21       Atenolol (ATENOLOL) 50 Mg Tablet      50 MG PO QPM, #30 TAB 0 Refills         Reported         2/5/21       Spironolactone (Spironolactone*) 25 Mg Tablet      25 MG PO QDAY, #30 TAB 0 Refills         Reported         11/9/20       Omeprazole (priLOSEC) 20 Mg Capsule.dr      20 MG PO QDAY PRN for HEARTBURN, #30 CAP 0 Refills         Reported         8/15/19       Pravastatin Sodium (Pravachol*) 20 Mg Tablet      20 MG PO HS         Reported         9/26/12        Benazepril HCl (Benazepril HCl) 40 Mg Tab      40 MG PO QAM         Reported         9/26/12      Medications Reviewed:  No Changes made to meds            IMPRESSION/PLAN      Diagnosis      Breast cancer, right         Malignant neoplasm of right female breast, unspecified estrogen receptor        status, unspecified site of breast         Breast location: unspecified site of breast         Estrogen receptor status: unspecified         Patient sex: female      Status post treatments as outlined.  Patient is doing well.  I see no evidence     of disease recurrence per history, physical examination or recent mammogram.      Continue follow-up with mammogram prior on a yearly basis            Notes      New Diagnostics      * Screening Mammo Left, 02/07/22         Dx: Breast cancer, right - C50.911            Advanced Care Plan Discussion      Declines Discussion 1124F            Patient Education            Aerobic Exercise      Eat Well, Exercise Well, Be Well: Dietary and Fitness Guidelines      Patient Education Provided:  Yes            Electronically signed by JEZ HASSAN  04/01/2021 14:46       Disclaimer: Converted document may not contain table formatting or lab diagrams. Please see [employer] for the authenticated document.

## 2021-05-28 NOTE — PROGRESS NOTES
Patient: ELIOT DOMINIQUE     Acct: [id number]     Report: #[id number]  UNIT #: [id number]     : 1945    Encounter Date:2019  PRIMARY CARE: NATHALIA ADAME  ***Signed***  --------------------------------------------------------------------------------------------------------------------  NURSE INTAKE      Visit Type      New Patient Visit            Chief Complaint      HX BREAST CA      Intent of Therapy:  Curative            Referring Provider/Copies To      Referring Provider:  NATHALIA ADAME      Primary Care Provider:  NATHALIA ADAME            History and Present Illness      Past Oncology Illness History      Ms. Dominique is a 73yo WF dx with right breast ca in .  Pt underwent right     radical mastectomy with lymph node dissection on 96.  Pathology: diffusely     infiltrating poorly differentiated ductal carcinoma with 4 of 24 lymph nodes +     for metastases.  Superior and deep margins were negative.  Per pt and     documentation states pt completed adj chemotherapy followed by radiation and     then completed 5yrs of Tamoxifen therapy.  Pt has had routine follow-up w/o     issues until recently when she developed a small area that opened on mastectomy     incision.  scab-felt to be infection and was treated with PO antbx.  Since that     time the area has either remained scabbed or opens and had an opening.  She was     previously seen by Dr. Edwards/oncology and he retired.  Was sent to Dr. Ryan Carmona/[facility]-breast surgeon for evaluation. He recommended excising the area but    patient wasn't inclined to proceed at that time.            HPI - Oncology Interim      Initial visit to establish care--she currently has small scabbed at the inner     aspect of rt mastectomy incision.  Scab is dry at this time; no s/sx infection.     Pt was seen by breast surgeon regarding resection of area; however, told her it     was up to her when she did it-no urgency.  She is anxious and  concerned about po    ssible cancer recurrence.  Pt denies changes to left breast-4/30/19 MRI     breasts--benign.  No distress noted at this time.            Cancer Details            Right breast-poorly differentiated invasive ductal carcinoma            Clinical Staging      Stage III            Treatments      Chemotherapy      1997 completed adj chemotherapy      Radiation Therapy      1997 completed adj XRT rt chest wall/axilla            Clinical Trial Participant      No            ECOG Performance Status      0            Most Recent Lab Findings      Laboratory Tests      8/1/19 12:55            PAST, FAMILY   Past Medical History      Past Medical History:  Arthritis, High Cholesterol, Hypertension      Hematology/Oncology (F):  Breast Cancer            Past Surgical History      Cholecystectomy, Hysterectomy, Joint Replacement, Mastectomy Right            Family History            son and daughter healthy. no cancer in the family of which she is aware.            Social History      Marital Status:        Lives independently:  Yes      Number of Children:  2      Occupation:  RETIRED            Tobacco Use      Tobacco status:  Never smoker            Alcohol Use      Alcohol intake:  None            Substance Use      Substance use:  Denies use            REVIEW OF SYSTEMS      General:  Denies: Night Sweats      Eye:  Denies: Eye Redness      ENT:  Denies: Seizures      Cardiovascular:  Denies: Irregular Heartbeat      Respiratory:  Denies: Shortness of Air      Gastrointestinal:  Admits: Frequent Heartburn      Musculoskeletal:  Denies: Swollen Joints      Integumentary:  Denies: Mole Changes      Neurologic:  Admits: Dizziness      Psychiatric:  Admits: Anxiety            VITAL SIGNS AND SCORES      Vitals      Height 5 ft 1.61 in / 156.5 cm      Weight 146 lbs 13.222 oz / 66.6 kg      BSA 1.67 m2      BMI 27.2 kg/m2      Temperature 97.1 F / 36.17 C - Temporal      Pulse 66      Respirations  16      Blood Pressure 137/53 Sitting, Right Arm      Pulse Oximetry 97%, RM AIR            Pain Score      Pain Scale Used:  Numerical      Pain Intensity:  0            Fatigue Score      Fatigue (0-10 scale):  0 (none)            EXAM      General Appearance:  Positive for: Alert, Oriented x3, Cooperative;          Negative for: Acute Distress      Neck:  Positive for: Supple;          Negative for: JVD, Masses      Respiratory:  Positive for: CTAB, Normal Respiratory Effort      Breast - Left:  Positive for: Appearance (Normal-appearing female breast);          Negative for: Adenopathy, Discharge, Mass, Skin Changes      Breast - Right:  Positive for: Appearance (Well-healed mastectomy incision with     small area of scabbing in the medial scar.  No obvious drainage or erythema);          Negative for: Adenopathy, Discharge, Mass, Skin Changes      Abdomen/Gastro:  Positive for: Normal Active Bowel Sounds, Soft;          Negative for: Distention, Hepatosplenomegaly, Tenderness      Cardiovascular:  Positive for: RRR;          Negative for: Gallop, Murmur, Peripheral Edema, Rub      Psychiatric:  Positive for: Appropriate Affect, Intact Judgement      Lymphatic:  Negative for: Axillary, Cervical, Infraclavicular, Supraclavicular            PREVENTION      Hx Influenza Vaccination:  Yes      Date Influenza Vaccine Given:  Nov 1, 2018      2 or More Falls Past Year?:  No      Fall Past Year with Injury?:  No      Hx Pneumococcal Vaccination:  Yes      Encouraged to follow-up with:  PCP regarding preventative exams.      Chart initiated by      PIOTR KEITH MA            ALLERGY/MEDS      Allergies      Coded Allergies:             IODINATED CONTRAST- ORAL AND IV DYE (Verified  Allergy, Unknown, BURNING IN    CHEST, 8/8/19)            Medications      Last Reconciled on 8/8/19 14:21 by JAREN ORDAZ      ClonazePAM (ClonazePAM) 0.5 Mg Tablet      0.5 MG PO QDAY, #30 TAB 0 Refills         Reported          8/8/19       Amoxicillin (Amoxicillin) 500 Mg Capsule      500 MG PO TID for 10 Days, #30 CAP 0 Refills         Prov: OSCAR MENDEZ cfr         1/14/19       Potassium Chloride (K-Dur*) 10 Meq Tab.er.prt      20 MEQ PO QDAY, #60 TAB 0 Refills         Reported         12/12/17       Eliazar-Fluticasone (Flonase*) 16 Gm Naspr      1 PUFF NA PRN         Reported         9/26/12       Pravastatin Sodium (Pravachol*) 20 Mg Tablet      20 MG PO HS         Reported         9/26/12       Benazepril HCl (Benazepril HCl) 40 Mg Tab      40 MG PO QAM         Reported         9/26/12       Atenolol/Chlorthalidone (Atenolol/Chlorthalidone 50/25 MG) 1 Tab Tablet      1 EACH PO QDAY         Reported         9/26/12      Medications Reviewed:  No Changes made to meds            IMPRESSION/PLAN      Diagnosis      Cyst      Patient appears to have a small cyst or abscess in the medial mastectomy     incision on the right.  The area has opened and drained a few times over the     last few months and she is required antibiotics.  I do not have high suspicion     that this represents recurrence of disease but given the problems she is having     from the area I think scar revision would be appropriate.  The tissue would be     sent for pathology but again I have low suspicion that this represents recurrent    disease after 23 years.  Referral to surgery here locally as the patient does     not wish to travel to [place].      New Referrals      * Surgery, As Soon As Possible         NICOLAS SANTO MD            History of breast cancer - Z85.3      Remote history, 23 years prior.  Stage III with treatment as outlined.  Her     records will be requested.  I will plan to see her back yearly with mammogram     prior            Notes      New Medications      * ClonazePAM 0.5 MG TABLET: 0.5 MG PO QDAY #30            Patient Education            Scars -- Overview                 Disclaimer: Converted document may not contain table  formatting or lab diagrams. Please see [employer] for the authenticated document.

## 2022-02-07 ENCOUNTER — HOSPITAL ENCOUNTER (OUTPATIENT)
Dept: MAMMOGRAPHY | Facility: HOSPITAL | Age: 77
Discharge: HOME | End: 2022-02-07
Admitting: INTERNAL MEDICINE

## 2022-02-07 DIAGNOSIS — Z12.31 SCREENING MAMMOGRAM FOR HIGH-RISK PATIENT: ICD-10-CM

## 2022-02-07 PROBLEM — Z85.3 HISTORY OF RIGHT BREAST CANCER: Status: ACTIVE | Noted: 2022-02-07

## 2022-02-07 PROCEDURE — 77063 BREAST TOMOSYNTHESIS BI: CPT

## 2022-02-07 PROCEDURE — 77067 SCR MAMMO BI INCL CAD: CPT

## 2022-02-08 ENCOUNTER — OFFICE VISIT (OUTPATIENT)
Dept: ONCOLOGY | Facility: HOSPITAL | Age: 77
End: 2022-02-08

## 2022-02-08 VITALS
BODY MASS INDEX: 26.39 KG/M2 | TEMPERATURE: 98.7 F | WEIGHT: 146.61 LBS | HEART RATE: 69 BPM | OXYGEN SATURATION: 100 % | RESPIRATION RATE: 16 BRPM | DIASTOLIC BLOOD PRESSURE: 64 MMHG | SYSTOLIC BLOOD PRESSURE: 146 MMHG

## 2022-02-08 DIAGNOSIS — Z12.31 ENCOUNTER FOR SCREENING MAMMOGRAM FOR BREAST CANCER: ICD-10-CM

## 2022-02-08 DIAGNOSIS — Z85.3 HISTORY OF RIGHT BREAST CANCER: Primary | ICD-10-CM

## 2022-02-08 PROBLEM — H93.19 TINNITUS: Status: ACTIVE | Noted: 2017-07-13

## 2022-02-08 PROBLEM — I10 ESSENTIAL HYPERTENSION: Status: ACTIVE | Noted: 2017-03-17

## 2022-02-08 PROBLEM — F41.1 GENERALIZED ANXIETY DISORDER: Status: ACTIVE | Noted: 2019-03-04

## 2022-02-08 PROBLEM — C50.919 MALIGNANT NEOPLASM OF BREAST (FEMALE): Status: ACTIVE | Noted: 2022-02-08

## 2022-02-08 PROBLEM — R42 VERTIGO: Status: ACTIVE | Noted: 2017-07-13

## 2022-02-08 PROBLEM — B00.9 HERPES SIMPLEX: Status: ACTIVE | Noted: 2018-04-16

## 2022-02-08 PROBLEM — R31.9 HEMATURIA: Status: ACTIVE | Noted: 2017-10-30

## 2022-02-08 PROBLEM — K21.9 GERD (GASTROESOPHAGEAL REFLUX DISEASE): Status: ACTIVE | Noted: 2019-05-06

## 2022-02-08 PROCEDURE — 99213 OFFICE O/P EST LOW 20 MIN: CPT | Performed by: INTERNAL MEDICINE

## 2022-02-08 PROCEDURE — G0463 HOSPITAL OUTPT CLINIC VISIT: HCPCS

## 2022-02-08 RX ORDER — ALENDRONATE SODIUM 70 MG/1
TABLET ORAL
COMMUNITY
Start: 2022-01-24

## 2022-02-08 RX ORDER — SODIUM PHOSPHATE,MONO-DIBASIC 19G-7G/118
ENEMA (ML) RECTAL DAILY
COMMUNITY

## 2022-02-08 RX ORDER — CETIRIZINE HYDROCHLORIDE 10 MG/1
10 TABLET ORAL DAILY
COMMUNITY

## 2022-02-08 RX ORDER — DULOXETIN HYDROCHLORIDE 30 MG/1
30 CAPSULE, DELAYED RELEASE ORAL DAILY
COMMUNITY
End: 2022-06-03

## 2022-02-08 NOTE — PROGRESS NOTES
Chief Complaint  Breast Cancer and Appointment    Claudia Patel PA-C Morrow, Beth E, PA-C    Subjective          Alina Velasquez presents to [facility] for for follow-up of right breast cancer.  She was diagnosed and treated 26 years ago.  She returns for yearly follow-up and mammogram.  She states she is doing okay.  She has a lot of arthritis complaints especially in the neck and right arm.  She denies any masses or adenopathy.  She has good appetite.  Her energy level is adequate for her daily needs.    Oncology/Hematology History    No history exists.       Review of Systems   Constitutional: Negative for appetite change, diaphoresis, fatigue, fever, unexpected weight gain and unexpected weight loss.   HENT: Negative for hearing loss, mouth sores, sore throat, swollen glands, trouble swallowing and voice change.    Eyes: Negative for blurred vision.   Respiratory: Negative for cough, shortness of breath and wheezing.    Cardiovascular: Negative for chest pain and palpitations.   Gastrointestinal: Negative for abdominal pain, blood in stool, constipation, diarrhea, nausea and vomiting.   Endocrine: Negative for cold intolerance and heat intolerance.   Genitourinary: Negative for difficulty urinating, dysuria, frequency, hematuria and urinary incontinence.   Musculoskeletal: Negative for arthralgias, back pain and myalgias.   Skin: Negative for rash, skin lesions and wound.   Neurological: Negative for dizziness, seizures, weakness, numbness and headache.   Hematological: Does not bruise/bleed easily.   Psychiatric/Behavioral: Negative for depressed mood. The patient is not nervous/anxious.      Current Outpatient Medications on File Prior to Visit   Medication Sig Dispense Refill   • alendronate (FOSAMAX) 70 MG tablet TAKE 1 TABLET BY MOUTH EVERY MONDAY MORNING     • atenolol (TENORMIN) 50 MG tablet      • azelastine (ASTELIN) 0.1 % nasal spray 2 sprays into the  nostril(s) as directed by provider 2 (Two) Times a Day. Use in each nostril as directed 30 mL 0   • benazepril (LOTENSIN) 40 MG tablet      • Calcium Citrate-Vitamin D 250-200 MG-UNIT tablet Take  by mouth.     • cetirizine (zyrTEC) 10 MG tablet Take 10 mg by mouth Daily.     • clonazePAM (KlonoPIN) 0.5 MG tablet      • Cyanocobalamin (B-12 COMPLIANCE INJECTION IJ) Inject  as directed. Every two weeks     • DULoxetine (CYMBALTA) 30 MG capsule Take 30 mg by mouth Daily.     • fluticasone (FLONASE) 50 MCG/ACT nasal spray      • glucosamine-chondroitin 500-400 MG capsule capsule Take  by mouth Daily.     • Omeprazole (PRILOSEC PO)      • pravastatin (PRAVACHOL) 20 MG tablet      • spironolactone (ALDACTONE) 25 MG tablet      • vitamin D3 125 MCG (5000 UT) capsule capsule Take 5,000 Units by mouth Daily.       No current facility-administered medications on file prior to visit.       Allergies   Allergen Reactions   • Contrast Dye Shortness Of Breath     Past Medical History:   Diagnosis Date   • Bloating 06/17/2016   • Breast cancer (HCC) 10/13/2017   • Essential hypertension 03/17/2017   • Generalized anxiety disorder 03/04/2019   • Hematuria 10/30/2017   • Herpes simplex 04/16/2018   • History of right breast cancer 2/8/2022   • Hyperlipemia 06/17/2016   • Neoplasm of breast, female, malignant (HCC) 1996    right breast   • Osteoarthritis, multiple sites 1994    throughout whole body   • Polyarthritis of multiple sites 07/13/2017   • Subclinical hypothyroidism 06/17/2016   • Tinnitus 07/13/2017   • Vertigo 07/13/2017     Past Surgical History:   Procedure Laterality Date   • ANKLE SURGERY Left 1995    Lt ankle d/t broken ankle injury   • CATARACT EXTRACTION  2008    with lens implant   • COLONOSCOPY      2013- 2016- [id number]   • ENDOSCOPY      2013- 2020   • EYE SURGERY      eye implant- yes   • HYSTERECTOMY      partial    • JOINT REPLACEMENT      Joint surgery   • KNEE SURGERY Right 2012    Knee replacement-   [facility]   • LAPAROSCOPIC CHOLECYSTECTOMY  2015   • MASTECTOMY Right 1996    Dr. Trent [facility]   • OTHER SURGICAL HISTORY      Artificial joints/limbs     Social History     Socioeconomic History   • Marital status:    Tobacco Use   • Smoking status: Never Smoker   • Smokeless tobacco: Never Used   Substance and Sexual Activity   • Alcohol use: Never     Comment: does not drink 3/4/2019- 1/29/2019- 1/21/2019- 10/16/2018     Family History   Problem Relation Age of Onset   • Hypertension Mother    • Arthritis Mother    • Osteoporosis Mother    • Arthritis Sister    • Osteoporosis Sister    • Heart disease Brother    • Hypertension Brother    • Diabetes Brother        Objective   Physical Exam  Vitals reviewed. Exam conducted with a chaperone present.   Constitutional:       General: She is not in acute distress.     Appearance: Normal appearance.   Cardiovascular:      Rate and Rhythm: Normal rate and regular rhythm.      Heart sounds: Normal heart sounds. No murmur heard.  No gallop.    Pulmonary:      Effort: Pulmonary effort is normal.      Breath sounds: Normal breath sounds.   Chest:   Breasts:      Right: Absent. No axillary adenopathy or supraclavicular adenopathy.      Left: No swelling, bleeding, inverted nipple, mass, nipple discharge, skin change, tenderness, axillary adenopathy or supraclavicular adenopathy.       Abdominal:      General: Abdomen is flat. Bowel sounds are normal.      Palpations: Abdomen is soft.   Musculoskeletal:      Cervical back: Neck supple.      Right lower leg: No edema.      Left lower leg: No edema.   Lymphadenopathy:      Cervical: No cervical adenopathy.      Upper Body:      Right upper body: No supraclavicular or axillary adenopathy.      Left upper body: No supraclavicular or axillary adenopathy.   Neurological:      Mental Status: She is alert and oriented to person, place, and time.   Psychiatric:         Mood and Affect: Mood normal.         Behavior: Behavior  normal.         Vitals:    02/08/22 1306   BP: 146/64   Pulse: 69   Resp: 16   Temp: 98.7 °F (37.1 °C)   SpO2: 100%   Weight: 66.5 kg (146 lb 9.7 oz)   PainSc: 0-No pain     ECOG score: 1         PHQ-9 Total Score:                    Result Review :   The following data was reviewed by: Goran Yepez MD on 02/08/2022:  Lab Results   Component Value Date    HGB 14.3 06/30/2020    HCT 42.1 06/30/2020    MCV 90.0 06/30/2020     06/30/2020    WBC 8.25 06/30/2020    NEUTROABS 5.49 06/30/2020    LYMPHSABS 1.84 06/30/2020    MONOSABS 0.62 06/30/2020    EOSABS 0.23 06/30/2020    BASOSABS 0.05 06/30/2020     Lab Results   Component Value Date    GLUCOSE 100 (H) 10/06/2020    BUN 13 10/06/2020    CREATININE 0.97 (H) 10/06/2020     10/06/2020    K 4.2 10/06/2020     10/06/2020    CO2 25 10/06/2020    CALCIUM 9.7 10/06/2020    PROTEINTOT 7.2 10/06/2020    ALBUMIN 4.2 10/06/2020    BILITOT 0.44 10/06/2020    ALKPHOS 78 10/06/2020    AST 22 10/06/2020    ALT 18 10/06/2020     Lab Results   Component Value Date    MG 1.84 01/26/2019    FREET4 1.1 10/06/2020    TSH 3.930 10/06/2020       Data reviewed: Radiologic studies Left mammogram reviewed      Assessment and Plan    Diagnoses and all orders for this visit:    1. History of right breast cancer (Primary)  Assessment & Plan:  Remote history of right breast cancer, status post mastectomy.  Patient returns for yearly follow-up.  Is no evidence of disease recurrence by history, physical examination or recent left mammogram which was reviewed with patient.  She does have a lot of osteoarthritic pain and discomfort.  She should follow-up with her primary care provider for further evaluation and treatment.  Per her request, I will see her back yearly with mammogram on the left breast prior.      2. Encounter for screening mammogram for breast cancer  -     Mammo screening modified with tomosynthesis left w CAD; Future          Patient Follow Up: 1  year    Patient was given instructions and counseling regarding her condition or for health maintenance advice. Please see specific information pulled into the AVS if appropriate.     Goran Yepez MD    2/8/2022

## 2022-02-08 NOTE — ASSESSMENT & PLAN NOTE
Remote history of right breast cancer, status post mastectomy.  Patient returns for yearly follow-up.  Is no evidence of disease recurrence by history, physical examination or recent left mammogram which was reviewed with patient.  She does have a lot of osteoarthritic pain and discomfort.  She should follow-up with her primary care provider for further evaluation and treatment.  Per her request, I will see her back yearly with mammogram on the left breast prior.

## 2022-02-28 ENCOUNTER — TELEPHONE (OUTPATIENT)
Dept: ONCOLOGY | Facility: HOSPITAL | Age: 77
End: 2022-02-28

## 2022-02-28 DIAGNOSIS — Z12.31 ENCOUNTER FOR SCREENING MAMMOGRAM FOR BREAST CANCER: Primary | ICD-10-CM

## 2022-05-03 ENCOUNTER — HOSPITAL ENCOUNTER (EMERGENCY)
Facility: HOSPITAL | Age: 77
Discharge: HOME | End: 2022-05-03
Attending: EMERGENCY MEDICINE | Admitting: EMERGENCY MEDICINE

## 2022-05-03 ENCOUNTER — APPOINTMENT (OUTPATIENT)
Dept: CT IMAGING | Facility: HOSPITAL | Age: 77
End: 2022-05-03

## 2022-05-03 VITALS
OXYGEN SATURATION: 100 % | DIASTOLIC BLOOD PRESSURE: 75 MMHG | TEMPERATURE: 97.7 F | HEART RATE: 59 BPM | HEIGHT: 63 IN | WEIGHT: 143.96 LBS | SYSTOLIC BLOOD PRESSURE: 131 MMHG | RESPIRATION RATE: 16 BRPM | BODY MASS INDEX: 25.51 KG/M2

## 2022-05-03 DIAGNOSIS — R10.9 RIGHT FLANK PAIN: Primary | ICD-10-CM

## 2022-05-03 LAB
ALBUMIN SERPL-MCNC: 4.4 G/DL (ref 3.5–5.2)
ALBUMIN/GLOB SERPL: 1.3 G/DL
ALP SERPL-CCNC: 58 U/L (ref 39–117)
ALT SERPL W P-5'-P-CCNC: 36 U/L (ref 1–33)
ANION GAP SERPL CALCULATED.3IONS-SCNC: 10.5 MMOL/L (ref 5–15)
AST SERPL-CCNC: 31 U/L (ref 1–32)
BACTERIA UR QL AUTO: ABNORMAL /HPF
BASOPHILS # BLD AUTO: 0.05 10*3/MM3 (ref 0–0.2)
BASOPHILS NFR BLD AUTO: 0.5 % (ref 0–1.5)
BILIRUB SERPL-MCNC: 0.4 MG/DL (ref 0–1.2)
BILIRUB UR QL STRIP: NEGATIVE
BUN SERPL-MCNC: 14 MG/DL (ref 8–23)
BUN/CREAT SERPL: 15.9 (ref 7–25)
CALCIUM SPEC-SCNC: 10.3 MG/DL (ref 8.6–10.5)
CHLORIDE SERPL-SCNC: 103 MMOL/L (ref 98–107)
CLARITY UR: CLEAR
CO2 SERPL-SCNC: 24.5 MMOL/L (ref 22–29)
COLOR UR: YELLOW
CREAT SERPL-MCNC: 0.88 MG/DL (ref 0.57–1)
D-LACTATE SERPL-SCNC: 1.3 MMOL/L (ref 0.5–2)
DEPRECATED RDW RBC AUTO: 42.8 FL (ref 37–54)
EGFRCR SERPLBLD CKD-EPI 2021: 67.8 ML/MIN/1.73
EOSINOPHIL # BLD AUTO: 0.26 10*3/MM3 (ref 0–0.4)
EOSINOPHIL NFR BLD AUTO: 2.6 % (ref 0.3–6.2)
ERYTHROCYTE [DISTWIDTH] IN BLOOD BY AUTOMATED COUNT: 12.9 % (ref 12.3–15.4)
GLOBULIN UR ELPH-MCNC: 3.4 GM/DL
GLUCOSE SERPL-MCNC: 114 MG/DL (ref 65–99)
GLUCOSE UR STRIP-MCNC: NEGATIVE MG/DL
HCT VFR BLD AUTO: 39.7 % (ref 34–46.6)
HGB BLD-MCNC: 13.6 G/DL (ref 12–15.9)
HGB UR QL STRIP.AUTO: ABNORMAL
HOLD SPECIMEN: NORMAL
HOLD SPECIMEN: NORMAL
HYALINE CASTS UR QL AUTO: ABNORMAL /LPF
IMM GRANULOCYTES # BLD AUTO: 0.04 10*3/MM3 (ref 0–0.05)
IMM GRANULOCYTES NFR BLD AUTO: 0.4 % (ref 0–0.5)
KETONES UR QL STRIP: NEGATIVE
LEUKOCYTE ESTERASE UR QL STRIP.AUTO: NEGATIVE
LIPASE SERPL-CCNC: 25 U/L (ref 13–60)
LYMPHOCYTES # BLD AUTO: 2.49 10*3/MM3 (ref 0.7–3.1)
LYMPHOCYTES NFR BLD AUTO: 25.2 % (ref 19.6–45.3)
MCH RBC QN AUTO: 30.9 PG (ref 26.6–33)
MCHC RBC AUTO-ENTMCNC: 34.3 G/DL (ref 31.5–35.7)
MCV RBC AUTO: 90.2 FL (ref 79–97)
MONOCYTES # BLD AUTO: 0.69 10*3/MM3 (ref 0.1–0.9)
MONOCYTES NFR BLD AUTO: 7 % (ref 5–12)
NEUTROPHILS NFR BLD AUTO: 6.37 10*3/MM3 (ref 1.7–7)
NEUTROPHILS NFR BLD AUTO: 64.3 % (ref 42.7–76)
NITRITE UR QL STRIP: NEGATIVE
NRBC BLD AUTO-RTO: 0 /100 WBC (ref 0–0.2)
PH UR STRIP.AUTO: 6.5 [PH] (ref 5–8)
PLATELET # BLD AUTO: 309 10*3/MM3 (ref 140–450)
PMV BLD AUTO: 9.5 FL (ref 6–12)
POTASSIUM SERPL-SCNC: 4.3 MMOL/L (ref 3.5–5.2)
PROT SERPL-MCNC: 7.8 G/DL (ref 6–8.5)
PROT UR QL STRIP: NEGATIVE
RBC # BLD AUTO: 4.4 10*6/MM3 (ref 3.77–5.28)
RBC # UR STRIP: ABNORMAL /HPF
REF LAB TEST METHOD: ABNORMAL
SODIUM SERPL-SCNC: 138 MMOL/L (ref 136–145)
SP GR UR STRIP: 1.01 (ref 1–1.03)
SQUAMOUS #/AREA URNS HPF: ABNORMAL /HPF
UROBILINOGEN UR QL STRIP: ABNORMAL
WBC # UR STRIP: ABNORMAL /HPF
WBC NRBC COR # BLD: 9.9 10*3/MM3 (ref 3.4–10.8)
WHOLE BLOOD HOLD SPECIMEN: NORMAL
WHOLE BLOOD HOLD SPECIMEN: NORMAL

## 2022-05-03 PROCEDURE — 80053 COMPREHEN METABOLIC PANEL: CPT

## 2022-05-03 PROCEDURE — 85025 COMPLETE CBC W/AUTO DIFF WBC: CPT

## 2022-05-03 PROCEDURE — 25010000002 FENTANYL CITRATE (PF) 50 MCG/ML SOLUTION: Performed by: EMERGENCY MEDICINE

## 2022-05-03 PROCEDURE — 96375 TX/PRO/DX INJ NEW DRUG ADDON: CPT

## 2022-05-03 PROCEDURE — 83690 ASSAY OF LIPASE: CPT

## 2022-05-03 PROCEDURE — 99283 EMERGENCY DEPT VISIT LOW MDM: CPT

## 2022-05-03 PROCEDURE — 25010000002 KETOROLAC TROMETHAMINE PER 15 MG: Performed by: EMERGENCY MEDICINE

## 2022-05-03 PROCEDURE — 74176 CT ABD & PELVIS W/O CONTRAST: CPT

## 2022-05-03 PROCEDURE — 83605 ASSAY OF LACTIC ACID: CPT

## 2022-05-03 PROCEDURE — 96374 THER/PROPH/DIAG INJ IV PUSH: CPT

## 2022-05-03 PROCEDURE — 81001 URINALYSIS AUTO W/SCOPE: CPT

## 2022-05-03 RX ORDER — VALACYCLOVIR HYDROCHLORIDE 1 G/1
1000 TABLET, FILM COATED ORAL 2 TIMES DAILY
Qty: 20 TABLET | Refills: 0 | Status: SHIPPED | OUTPATIENT
Start: 2022-05-03 | End: 2022-06-03

## 2022-05-03 RX ORDER — DIFLUNISAL 500 MG/1
500 TABLET, FILM COATED ORAL 2 TIMES DAILY PRN
Qty: 20 TABLET | Refills: 0 | Status: SHIPPED | OUTPATIENT
Start: 2022-05-03

## 2022-05-03 RX ORDER — KETOROLAC TROMETHAMINE 15 MG/ML
15 INJECTION, SOLUTION INTRAMUSCULAR; INTRAVENOUS ONCE
Status: COMPLETED | OUTPATIENT
Start: 2022-05-03 | End: 2022-05-03

## 2022-05-03 RX ORDER — FENTANYL CITRATE 50 UG/ML
25 INJECTION, SOLUTION INTRAMUSCULAR; INTRAVENOUS ONCE
Status: COMPLETED | OUTPATIENT
Start: 2022-05-03 | End: 2022-05-03

## 2022-05-03 RX ORDER — SODIUM CHLORIDE 0.9 % (FLUSH) 0.9 %
10 SYRINGE (ML) INJECTION AS NEEDED
Status: DISCONTINUED | OUTPATIENT
Start: 2022-05-03 | End: 2022-05-03 | Stop reason: HOSPADM

## 2022-05-03 RX ORDER — HYDROCODONE BITARTRATE AND ACETAMINOPHEN 5; 325 MG/1; MG/1
1 TABLET ORAL EVERY 6 HOURS PRN
Qty: 15 TABLET | Refills: 0 | Status: SHIPPED | OUTPATIENT
Start: 2022-05-03 | End: 2022-06-03

## 2022-05-03 RX ADMIN — FENTANYL CITRATE 25 MCG: 50 INJECTION, SOLUTION INTRAMUSCULAR; INTRAVENOUS at 07:29

## 2022-05-03 RX ADMIN — SODIUM CHLORIDE 1000 ML: 9 INJECTION, SOLUTION INTRAVENOUS at 07:26

## 2022-05-03 RX ADMIN — KETOROLAC TROMETHAMINE 15 MG: 15 INJECTION, SOLUTION INTRAMUSCULAR; INTRAVENOUS at 07:28

## 2022-05-03 NOTE — DISCHARGE INSTRUCTIONS
Take medications as directed.  Watch area closely for signs of shingles.  Return for worsening symptoms.  Follow-up with your doctor in 2 days if no better.

## 2022-05-03 NOTE — ED PROVIDER NOTES
"Subjective   Alina Velasquez is a 76 y.o. female who presents to the emergency department today with complaints of constant abdominal pain since 1400 yesterday afternoon. Pt states the pain is located to her RUQ and radiates around to her back. Pt notes sx began with abdominal distention followed by a \"sharp\" pain that is exacerbated with deep inspiration. Pt goes on to states that she feels as if she has to keep moving to get comfortable-though she never finds relief. She reports taking a Tylenol for the pain with mild relief. Pt goes on to complain of nausea. She denies fever, emesis, diarrhea, shortness of breath, dysuria, melena, chest pain, or any other pertinent sx.      History provided by:  Patient   used: No        Review of Systems   Constitutional: Negative for chills and fever.   HENT: Negative for congestion, rhinorrhea and sore throat.    Eyes: Negative for pain and visual disturbance.   Respiratory: Negative for apnea, cough, chest tightness and shortness of breath.    Cardiovascular: Negative for chest pain and palpitations.   Gastrointestinal: Positive for abdominal distention, abdominal pain and nausea. Negative for diarrhea and vomiting.   Genitourinary: Negative for difficulty urinating and dysuria.   Musculoskeletal: Negative for joint swelling and myalgias.   Skin: Negative for color change.   Neurological: Negative for seizures and headaches.   Psychiatric/Behavioral: Negative.    All other systems reviewed and are negative.      Past Medical History:   Diagnosis Date   • Bloating 06/17/2016   • Breast cancer (HCC) 10/13/2017   • Essential hypertension 03/17/2017   • Generalized anxiety disorder 03/04/2019   • Hematuria 10/30/2017   • Herpes simplex 04/16/2018   • History of right breast cancer 2/8/2022   • Hyperlipemia 06/17/2016   • Neoplasm of breast, female, malignant (HCC) 1996    right breast   • Osteoarthritis, multiple sites 1994    throughout whole body   • " Polyarthritis of multiple sites 07/13/2017   • Subclinical hypothyroidism 06/17/2016   • Tinnitus 07/13/2017   • Vertigo 07/13/2017       Allergies   Allergen Reactions   • Contrast Dye Shortness Of Breath       Past Surgical History:   Procedure Laterality Date   • ANKLE SURGERY Left 1995    Lt ankle d/t broken ankle injury   • CATARACT EXTRACTION  2008    with lens implant   • COLONOSCOPY      2013- 2016- [id number]   • ENDOSCOPY      2013- 2020   • EYE SURGERY      eye implant- yes   • HYSTERECTOMY      partial    • JOINT REPLACEMENT      Joint surgery   • KNEE SURGERY Right 2012    Knee replacement- [facility]   • LAPAROSCOPIC CHOLECYSTECTOMY  2015   • MASTECTOMY Right 1996    Dr. Trent [facility]   • OTHER SURGICAL HISTORY      Artificial joints/limbs       Family History   Problem Relation Age of Onset   • Hypertension Mother    • Arthritis Mother    • Osteoporosis Mother    • Arthritis Sister    • Osteoporosis Sister    • Heart disease Brother    • Hypertension Brother    • Diabetes Brother        Social History     Socioeconomic History   • Marital status:    Tobacco Use   • Smoking status: Never Smoker   • Smokeless tobacco: Never Used   Substance and Sexual Activity   • Alcohol use: Never     Comment: does not drink 3/4/2019- 1/29/2019- 1/21/2019- 10/16/2018         Objective   Physical Exam  Vitals and nursing note reviewed.   Constitutional:       General: She is not in acute distress.     Appearance: Normal appearance.   HENT:      Head: Normocephalic and atraumatic.      Nose: Nose normal.      Mouth/Throat:      Pharynx: Oropharynx is clear.   Eyes:      General: No scleral icterus.     Conjunctiva/sclera: Conjunctivae normal.   Cardiovascular:      Rate and Rhythm: Normal rate and regular rhythm.      Heart sounds: Normal heart sounds. No murmur heard.  Pulmonary:      Effort: No respiratory distress.      Breath sounds: Normal breath sounds. No wheezing, rhonchi or rales.   Chest:      Chest  wall: No tenderness.      Comments: Right sided mastectomy   Abdominal:      Palpations: Abdomen is soft.      Tenderness: There is abdominal tenderness (mild) in the right upper quadrant and epigastric area. There is no guarding or rebound.      Comments: No rigidity.   Musculoskeletal:         General: No tenderness. Normal range of motion.      Cervical back: Normal range of motion and neck supple.      Right lower leg: No edema.      Left lower leg: No edema.   Skin:     General: Skin is warm and dry.   Neurological:      Mental Status: She is alert. Mental status is at baseline.   Psychiatric:         Mood and Affect: Mood normal.         Behavior: Behavior normal.         Procedures          Labs Reviewed   COMPREHENSIVE METABOLIC PANEL - Abnormal; Notable for the following components:       Result Value    Glucose 114 (*)     ALT (SGPT) 36 (*)     All other components within normal limits    Narrative:     GFR Normal >60  Chronic Kidney Disease <60  Kidney Failure <15     URINALYSIS W/ MICROSCOPIC IF INDICATED (NO CULTURE) - Abnormal; Notable for the following components:    Blood, UA Small (1+) (*)     All other components within normal limits   URINALYSIS, MICROSCOPIC ONLY - Abnormal; Notable for the following components:    RBC, UA 3-5 (*)     WBC, UA 0-2 (*)     All other components within normal limits   LIPASE - Normal   LACTIC ACID, PLASMA - Normal   CBC WITH AUTO DIFFERENTIAL - Normal   RAINBOW DRAW    Narrative:     The following orders were created for panel order [place] Draw.  Procedure                               Abnormality         Status                     ---------                               -----------         ------                     Green Top (Gel)[[id number]]                                  Final result               Lavender Top[[id number]]                                     Final result               Gold Top - SST[[id number]]                                   Final result                Light Blue Top[[id number]]                                   Final result                 Please view results for these tests on the individual orders.   CBC AND DIFFERENTIAL    Narrative:     The following orders were created for panel order CBC & Differential.  Procedure                               Abnormality         Status                     ---------                               -----------         ------                     CBC Auto Differential[[id number]]        Normal              Final result                 Please view results for these tests on the individual orders.   GREEN TOP   LAVENDER TOP   GOLD TOP - SST   LIGHT BLUE TOP         CT Abdomen Pelvis Without Contrast    Result Date: 5/3/2022  Narrative: PROCEDURE: CT ABDOMEN PELVIS WO CONTRAST  COMPARISON: [facility], CT, ABD PEL W/O CONTRAST, 6/30/2020, 12:13.  INDICATIONS: GENERALIZED RIGHT FLANK PAIN  TECHNIQUE: CT images were created without intravenous contrast.   PROTOCOL:   Standard imaging protocol performed    RADIATION:   DLP: 395.4mGy*cm   Automated exposure control was utilized to minimize radiation dose.  FINDINGS:  No evidence of pneumonia or other acute findings in the base of the chest.  No evidence of pancreatitis or other acute process of the organs throughout the abdomen.  No bowel obstruction.  No evidence of colitis or diverticulitis.  Cholecystectomy clips are noted.  There is diffuse colonic diverticulosis but no evidence of diverticulitis.  No nephrolithiasis of either kidney.  Peripelvic cysts of both kidneys again noted incidentally.  No ascites or free air.  No abscess.   No evidence of acute process in the pelvis.  Bladder appears within normal limits.  No acute or aggressive osseous findings.      Impression:  Negative CT of the abdomen/pelvis as above.  No evidence of acute process, no findings to explain the right flank pain.     MOSHE JACOB MD       Electronically Signed and Approved By: MOSHE JACOB  MD on 5/03/2022 at 7:30                 ED Course     Labs Reviewed   COMPREHENSIVE METABOLIC PANEL - Abnormal; Notable for the following components:       Result Value    Glucose 114 (*)     ALT (SGPT) 36 (*)     All other components within normal limits    Narrative:     GFR Normal >60  Chronic Kidney Disease <60  Kidney Failure <15     URINALYSIS W/ MICROSCOPIC IF INDICATED (NO CULTURE) - Abnormal; Notable for the following components:    Blood, UA Small (1+) (*)     All other components within normal limits   URINALYSIS, MICROSCOPIC ONLY - Abnormal; Notable for the following components:    RBC, UA 3-5 (*)     WBC, UA 0-2 (*)     All other components within normal limits   LIPASE - Normal   LACTIC ACID, PLASMA - Normal   CBC WITH AUTO DIFFERENTIAL - Normal   RAINBOW DRAW    Narrative:     The following orders were created for panel order [place] Draw.  Procedure                               Abnormality         Status                     ---------                               -----------         ------                     Green Top (Gel)[[id number]]                                  Final result               Lavender Top[[id number]]                                     Final result               Gold Top - SST[[id number]]                                   Final result               Light Blue Top[[id number]]                                   Final result                 Please view results for these tests on the individual orders.   CBC AND DIFFERENTIAL    Narrative:     The following orders were created for panel order CBC & Differential.  Procedure                               Abnormality         Status                     ---------                               -----------         ------                     CBC Auto Differential[[id number]]        Normal              Final result                 Please view results for these tests on the individual orders.   GREEN TOP   LAVENDER TOP   GOLD TOP - SST   LIGHT BLUE  TOP     No Radiology Exams Resulted Within Past 24 Hours                                         MDM  Number of Diagnoses or Management Options  Right flank pain  Diagnosis management comments: I had a lengthy discussion  With the patient and family about cause of pain which I feel is either radicular or herpetic neuralgia.       Amount and/or Complexity of Data Reviewed  Clinical lab tests: reviewed  Tests in the radiology section of CPT®: reviewed  Decide to obtain previous medical records or to obtain history from someone other than the patient: yes  Obtain history from someone other than the patient: yes  Review and summarize past medical records: yes  Independent visualization of images, tracings, or specimens: yes    Patient Progress  Patient progress: improved      Final diagnoses:   Right flank pain       Documentation assistance provided by iraj Parson.  Information recorded by the scribe was done at my direction and has been verified and validated by me.     Stephanie Parson  05/03/22 0650       Stephanie Parson  05/03/22 0652       Randall Bruce DO  05/04/22 0798

## 2022-05-03 NOTE — ED TRIAGE NOTES
"Pt to ED from home with reports of right sided abd pain and nausea since yesterday at noon.  Pt states \"it's like it's gas pain and I've been real bloated\".      Pt reports pain is in RLQ abdomen and radiates around to back.  "

## 2022-05-09 ENCOUNTER — HOSPITAL ENCOUNTER (EMERGENCY)
Facility: HOSPITAL | Age: 77
Discharge: LEFT BEFORE DISCHARGE | End: 2022-05-09

## 2022-05-09 PROCEDURE — 99211 OFF/OP EST MAY X REQ PHY/QHP: CPT

## 2022-05-12 ENCOUNTER — HOSPITAL ENCOUNTER (EMERGENCY)
Facility: HOSPITAL | Age: 77
Discharge: HOME | End: 2022-05-12
Attending: EMERGENCY MEDICINE | Admitting: EMERGENCY MEDICINE

## 2022-05-12 VITALS
WEIGHT: 143.3 LBS | HEART RATE: 61 BPM | OXYGEN SATURATION: 100 % | TEMPERATURE: 97.9 F | HEIGHT: 63 IN | RESPIRATION RATE: 18 BRPM | BODY MASS INDEX: 25.39 KG/M2 | DIASTOLIC BLOOD PRESSURE: 65 MMHG | SYSTOLIC BLOOD PRESSURE: 178 MMHG

## 2022-05-12 DIAGNOSIS — B02.8 HERPES ZOSTER WITH COMPLICATION: ICD-10-CM

## 2022-05-12 DIAGNOSIS — R10.9 RIGHT FLANK PAIN: Primary | ICD-10-CM

## 2022-05-12 LAB
BACTERIA UR QL AUTO: ABNORMAL /HPF
BASOPHILS # BLD AUTO: 0.08 10*3/MM3 (ref 0–0.2)
BASOPHILS NFR BLD AUTO: 0.8 % (ref 0–1.5)
BILIRUB UR QL STRIP: NEGATIVE
CLARITY UR: CLEAR
COLOR UR: YELLOW
DEPRECATED RDW RBC AUTO: 41.7 FL (ref 37–54)
EOSINOPHIL # BLD AUTO: 0.29 10*3/MM3 (ref 0–0.4)
EOSINOPHIL NFR BLD AUTO: 2.9 % (ref 0.3–6.2)
ERYTHROCYTE [DISTWIDTH] IN BLOOD BY AUTOMATED COUNT: 13.1 % (ref 12.3–15.4)
GLUCOSE UR STRIP-MCNC: NEGATIVE MG/DL
HCT VFR BLD AUTO: 42.2 % (ref 34–46.6)
HGB BLD-MCNC: 14.6 G/DL (ref 12–15.9)
HGB UR QL STRIP.AUTO: ABNORMAL
HOLD SPECIMEN: NORMAL
HOLD SPECIMEN: NORMAL
HYALINE CASTS UR QL AUTO: ABNORMAL /LPF
IMM GRANULOCYTES # BLD AUTO: 0.04 10*3/MM3 (ref 0–0.05)
IMM GRANULOCYTES NFR BLD AUTO: 0.4 % (ref 0–0.5)
KETONES UR QL STRIP: NEGATIVE
LEUKOCYTE ESTERASE UR QL STRIP.AUTO: NEGATIVE
LIPASE SERPL-CCNC: 22 U/L (ref 13–60)
LYMPHOCYTES # BLD AUTO: 2.12 10*3/MM3 (ref 0.7–3.1)
LYMPHOCYTES NFR BLD AUTO: 21.5 % (ref 19.6–45.3)
MCH RBC QN AUTO: 30.7 PG (ref 26.6–33)
MCHC RBC AUTO-ENTMCNC: 34.6 G/DL (ref 31.5–35.7)
MCV RBC AUTO: 88.8 FL (ref 79–97)
MONOCYTES # BLD AUTO: 0.68 10*3/MM3 (ref 0.1–0.9)
MONOCYTES NFR BLD AUTO: 6.9 % (ref 5–12)
NEUTROPHILS NFR BLD AUTO: 6.66 10*3/MM3 (ref 1.7–7)
NEUTROPHILS NFR BLD AUTO: 67.5 % (ref 42.7–76)
NITRITE UR QL STRIP: NEGATIVE
NRBC BLD AUTO-RTO: 0 /100 WBC (ref 0–0.2)
PH UR STRIP.AUTO: <=5 [PH] (ref 5–8)
PLATELET # BLD AUTO: 247 10*3/MM3 (ref 140–450)
PMV BLD AUTO: 10.1 FL (ref 6–12)
PROT UR QL STRIP: NEGATIVE
RBC # BLD AUTO: 4.75 10*6/MM3 (ref 3.77–5.28)
RBC # UR STRIP: ABNORMAL /HPF
REF LAB TEST METHOD: ABNORMAL
SP GR UR STRIP: <=1.005 (ref 1–1.03)
SQUAMOUS #/AREA URNS HPF: ABNORMAL /HPF
UROBILINOGEN UR QL STRIP: ABNORMAL
WBC # UR STRIP: ABNORMAL /HPF
WBC NRBC COR # BLD: 9.87 10*3/MM3 (ref 3.4–10.8)
WHOLE BLOOD HOLD SPECIMEN: NORMAL

## 2022-05-12 PROCEDURE — 81001 URINALYSIS AUTO W/SCOPE: CPT | Performed by: NURSE PRACTITIONER

## 2022-05-12 PROCEDURE — 83690 ASSAY OF LIPASE: CPT | Performed by: NURSE PRACTITIONER

## 2022-05-12 PROCEDURE — 85025 COMPLETE CBC W/AUTO DIFF WBC: CPT | Performed by: NURSE PRACTITIONER

## 2022-05-12 PROCEDURE — 25010000002 FENTANYL CITRATE (PF) 50 MCG/ML SOLUTION: Performed by: EMERGENCY MEDICINE

## 2022-05-12 PROCEDURE — 25010000002 KETOROLAC TROMETHAMINE PER 15 MG: Performed by: EMERGENCY MEDICINE

## 2022-05-12 PROCEDURE — 96375 TX/PRO/DX INJ NEW DRUG ADDON: CPT

## 2022-05-12 PROCEDURE — 96374 THER/PROPH/DIAG INJ IV PUSH: CPT

## 2022-05-12 PROCEDURE — 99283 EMERGENCY DEPT VISIT LOW MDM: CPT

## 2022-05-12 RX ORDER — GABAPENTIN 100 MG/1
100 CAPSULE ORAL 3 TIMES DAILY
COMMUNITY

## 2022-05-12 RX ORDER — FENTANYL CITRATE 50 UG/ML
25 INJECTION, SOLUTION INTRAMUSCULAR; INTRAVENOUS ONCE
Status: COMPLETED | OUTPATIENT
Start: 2022-05-12 | End: 2022-05-12

## 2022-05-12 RX ORDER — SODIUM CHLORIDE 0.9 % (FLUSH) 0.9 %
10 SYRINGE (ML) INJECTION AS NEEDED
Status: DISCONTINUED | OUTPATIENT
Start: 2022-05-12 | End: 2022-05-12 | Stop reason: HOSPADM

## 2022-05-12 RX ORDER — KETOROLAC TROMETHAMINE 15 MG/ML
15 INJECTION, SOLUTION INTRAMUSCULAR; INTRAVENOUS ONCE
Status: COMPLETED | OUTPATIENT
Start: 2022-05-12 | End: 2022-05-12

## 2022-05-12 RX ORDER — HYDROCODONE BITARTRATE AND ACETAMINOPHEN 5; 325 MG/1; MG/1
1 TABLET ORAL EVERY 6 HOURS PRN
Status: DISCONTINUED | OUTPATIENT
Start: 2022-05-12 | End: 2022-05-12 | Stop reason: HOSPADM

## 2022-05-12 RX ADMIN — HYDROCODONE BITARTRATE AND ACETAMINOPHEN 1 TABLET: 5; 325 TABLET ORAL at 06:59

## 2022-05-12 RX ADMIN — FENTANYL CITRATE 25 MCG: 50 INJECTION, SOLUTION INTRAMUSCULAR; INTRAVENOUS at 05:48

## 2022-05-12 RX ADMIN — KETOROLAC TROMETHAMINE 15 MG: 15 INJECTION, SOLUTION INTRAMUSCULAR; INTRAVENOUS at 05:47

## 2022-05-12 NOTE — DISCHARGE INSTRUCTIONS
Continue to take your Neurontin as prescribed yesterday.    Use Norco as prescribed for pain and other anti-inflammatories.    Follow-up with your PCP for further evaluation.    Return for new or worsening symptoms

## 2022-05-12 NOTE — ED PROVIDER NOTES
Time: 22:41 EDT  Arrived by: Vehicle  Chief Complaint: Flank and abdomen pain  History provided by: Patient  History is limited by: N/A    History of Present Illness:  Patient is a 76 y.o. year old female that presents to the emergency department with right side flank and abdomen pain      Flank Pain  Pain location:  R flank  Pain quality: burning and sharp    Pain radiates to:  RUQ  Pain severity:  Severe  Onset quality:  Gradual  Duration:  10 days  Timing:  Constant  Progression:  Unchanged  Chronicity:  New  Context comment:  Diagnosed with shingles 10 days ago and is no better  Relieved by:  Nothing  Worsened by:  Palpation and movement  Ineffective treatments: She has been on hydrocodone and NSAIDs as well as started Neurontin today.  Hydrocodone helps very little for small period of time.  Associated symptoms: no anorexia, no belching, no chest pain, no chills, no constipation, no cough, no diarrhea, no dysuria, no fatigue, no fever, no flatus, no hematemesis, no hematochezia, no hematuria, no melena, no nausea, no shortness of breath, no sore throat and no vomiting    Abdominal Pain  Associated symptoms: no anorexia, no belching, no chest pain, no chills, no constipation, no cough, no diarrhea, no dysuria, no fatigue, no fever, no flatus, no hematemesis, no hematochezia, no hematuria, no melena, no nausea, no shortness of breath, no sore throat and no vomiting        Similar Symptoms Previously: Not prior to 10 days ago  Recently seen: Patient seen here on the third in urgent care on the ninth and was diagnosed with shingles and put on medication.  Saw her PCP today and was put on Neurontin for continued pain      Patient Care Team  Primary Care Provider: Claudia Patel    Past Medical History:     Allergies   Allergen Reactions   • Contrast Dye Shortness Of Breath     Past Medical History:   Diagnosis Date   • Bloating 06/17/2016   • Breast cancer (HCC) 10/13/2017   • Essential hypertension 03/17/2017   •  Generalized anxiety disorder 03/04/2019   • Hematuria 10/30/2017   • Herpes simplex 04/16/2018   • History of right breast cancer 2/8/2022   • Hyperlipemia 06/17/2016   • Neoplasm of breast, female, malignant (HCC) 1996    right breast   • Osteoarthritis, multiple sites 1994    throughout whole body   • Polyarthritis of multiple sites 07/13/2017   • Subclinical hypothyroidism 06/17/2016   • Tinnitus 07/13/2017   • Vertigo 07/13/2017     Past Surgical History:   Procedure Laterality Date   • ANKLE SURGERY Left 1995    Lt ankle d/t broken ankle injury   • CATARACT EXTRACTION  2008    with lens implant   • COLONOSCOPY      2013- 2016- [id number]   • ENDOSCOPY      2013- 2020   • EYE SURGERY      eye implant- yes   • HYSTERECTOMY      partial    • JOINT REPLACEMENT      Joint surgery   • KNEE SURGERY Right 2012    Knee replacement- Dr. Gupta-[facility]   • LAPAROSCOPIC CHOLECYSTECTOMY  2015   • MASTECTOMY Right 1996    Dr. Trent Sun   • OTHER SURGICAL HISTORY      Artificial joints/limbs     Family History   Problem Relation Age of Onset   • Hypertension Mother    • Arthritis Mother    • Osteoporosis Mother    • Arthritis Sister    • Osteoporosis Sister    • Heart disease Brother    • Hypertension Brother    • Diabetes Brother        Home Medications:  Prior to Admission medications    Medication Sig Start Date End Date Taking? Authorizing Provider   alendronate (FOSAMAX) 70 MG tablet TAKE 1 TABLET BY MOUTH EVERY MONDAY MORNING 1/24/22   Provider, MD Be   atenolol (TENORMIN) 50 MG tablet  7/19/21   Emergency, Nurse Epic, RN   azelastine (ASTELIN) 0.1 % nasal spray 2 sprays into the nostril(s) as directed by provider 2 (Two) Times a Day. Use in each nostril as directed 10/26/21   Misbah Sargent MD   benazepril (LOTENSIN) 40 MG tablet  8/28/21   Emergency, Nurse Taty, RN   Calcium Citrate-Vitamin D 250-200 MG-UNIT tablet Take  by mouth.    Provider, MD Be   cetirizine (zyrTEC) 10 MG tablet Take 10 mg  by mouth Daily.    Provider, MD Be   clonazePAM (KlonoPIN) 0.5 MG tablet  7/19/21   Emergency, Nurse Taty RN   Cyanocobalamin (B-12 COMPLIANCE INJECTION IJ) Inject  as directed. Every two weeks    ProviderBe MD   diflunisal (DOLOBID) 500 MG tablet tablet Take 1 tablet by mouth 2 (Two) Times a Day As Needed (Pain). 5/3/22   Randall Bruce DO   DULoxetine (CYMBALTA) 30 MG capsule Take 30 mg by mouth Daily.    ProviderBe MD   fluticasone (FLONASE) 50 MCG/ACT nasal spray     Emergency, Nurse Epic, RN   glucosamine-chondroitin 500-400 MG capsule capsule Take  by mouth Daily.    ProviderBe MD   HYDROcodone-acetaminophen (NORCO) 5-325 MG per tablet Take 1 tablet by mouth Every 6 (Six) Hours As Needed (Pain). 5/3/22   Randall Bruce, DO   Omeprazole (PRILOSEC PO)     Emergency, Nurse Taty RN   pravastatin (PRAVACHOL) 20 MG tablet  8/7/21   Emergency, Nurse Taty RN   spironolactone (ALDACTONE) 25 MG tablet  8/28/21   Emergency, Nurse Taty RN   valACYclovir (VALTREX) 1000 MG tablet Take 1 tablet by mouth 2 (Two) Times a Day. 5/3/22   Randall Bruce,    vitamin D3 125 MCG (5000 UT) capsule capsule Take 5,000 Units by mouth Daily.    Emergency, Nurse Taty, RN        Social History:   PT  reports that she has never smoked. She has never used smokeless tobacco. She reports that she does not drink alcohol. No history on file for drug use.    Record Review:  I have reviewed the patient's records in [facility].     Review of Systems  Review of Systems   Constitutional: Negative for chills, fatigue and fever.   HENT: Negative for sore throat.    Respiratory: Negative for cough and shortness of breath.    Cardiovascular: Negative for chest pain.   Gastrointestinal: Positive for abdominal pain. Negative for anorexia, constipation, diarrhea, flatus, hematemesis, hematochezia, melena, nausea and vomiting.   Genitourinary: Positive for flank pain. Negative for dysuria and hematuria.  "  Musculoskeletal: Negative for neck pain.   Skin: Positive for rash.   Neurological: Negative.    Hematological: Negative.    Psychiatric/Behavioral: Negative.         Physical Exam  /65 (Patient Position: Sitting)   Pulse 61   Temp 97.9 °F (36.6 °C) (Oral)   Resp 18   Ht 158.8 cm (62.5\")   Wt 65 kg (143 lb 4.8 oz)   SpO2 100%   BMI 25.79 kg/m²     Physical Exam  Vitals and nursing note reviewed.   Constitutional:       Appearance: She is normal weight. She is not ill-appearing.   HENT:      Head: Atraumatic.   Cardiovascular:      Rate and Rhythm: Normal rate and regular rhythm.      Heart sounds: Normal heart sounds.   Pulmonary:      Effort: Pulmonary effort is normal.      Breath sounds: Normal breath sounds.   Abdominal:      General: Bowel sounds are normal.      Palpations: Abdomen is soft.      Tenderness: There is no abdominal tenderness. There is right CVA tenderness.      Hernia: No hernia is present.   Skin:     General: Skin is warm and dry.      Findings: Rash ( few scant vesicular lesions onright sie back) present.   Neurological:      Mental Status: She is oriented to person, place, and time.   Psychiatric:         Mood and Affect: Mood normal.         Behavior: Behavior normal.          ED Course  /65 (Patient Position: Sitting)   Pulse 61   Temp 97.9 °F (36.6 °C) (Oral)   Resp 18   Ht 158.8 cm (62.5\")   Wt 65 kg (143 lb 4.8 oz)   SpO2 100%   BMI 25.79 kg/m²   Results for orders placed or performed during the hospital encounter of 05/12/22   Lipase    Specimen: Arm, Left; Blood   Result Value Ref Range    Lipase 22 13 - 60 U/L   Urinalysis With Microscopic If Indicated (No Culture) - Urine, Clean Catch    Specimen: Urine, Clean Catch   Result Value Ref Range    Color, UA Yellow Yellow, Straw    Appearance, UA Clear Clear    pH, UA <=5.0 5.0 - 8.0    Specific Gravity, UA <=1.005 1.005 - 1.030    Glucose, UA Negative Negative    Ketones, UA Negative Negative    Bilirubin, UA " Negative Negative    Blood, UA Trace (A) Negative    Protein, UA Negative Negative    Leuk Esterase, UA Negative Negative    Nitrite, UA Negative Negative    Urobilinogen, UA 0.2 E.U./dL 0.2 - 1.0 E.U./dL   CBC Auto Differential    Specimen: Arm, Left; Blood   Result Value Ref Range    WBC 9.87 3.40 - 10.80 10*3/mm3    RBC 4.75 3.77 - 5.28 10*6/mm3    Hemoglobin 14.6 12.0 - 15.9 g/dL    Hematocrit 42.2 34.0 - 46.6 %    MCV 88.8 79.0 - 97.0 fL    MCH 30.7 26.6 - 33.0 pg    MCHC 34.6 31.5 - 35.7 g/dL    RDW 13.1 12.3 - 15.4 %    RDW-SD 41.7 37.0 - 54.0 fl    MPV 10.1 6.0 - 12.0 fL    Platelets 247 140 - 450 10*3/mm3    Neutrophil % 67.5 42.7 - 76.0 %    Lymphocyte % 21.5 19.6 - 45.3 %    Monocyte % 6.9 5.0 - 12.0 %    Eosinophil % 2.9 0.3 - 6.2 %    Basophil % 0.8 0.0 - 1.5 %    Immature Grans % 0.4 0.0 - 0.5 %    Neutrophils, Absolute 6.66 1.70 - 7.00 10*3/mm3    Lymphocytes, Absolute 2.12 0.70 - 3.10 10*3/mm3    Monocytes, Absolute 0.68 0.10 - 0.90 10*3/mm3    Eosinophils, Absolute 0.29 0.00 - 0.40 10*3/mm3    Basophils, Absolute 0.08 0.00 - 0.20 10*3/mm3    Immature Grans, Absolute 0.04 0.00 - 0.05 10*3/mm3    nRBC 0.0 0.0 - 0.2 /100 WBC   Urinalysis, Microscopic Only - Urine, Clean Catch    Specimen: Urine, Clean Catch   Result Value Ref Range    RBC, UA 0-2 (A) None Seen /HPF    WBC, UA 0-2 (A) None Seen /HPF    Bacteria, UA None Seen None Seen /HPF    Squamous Epithelial Cells, UA 0-2 None Seen, 0-2 /HPF    Hyaline Casts, UA None Seen None Seen /LPF    Methodology Automated Microscopy    Green Top (Gel)   Result Value Ref Range    Extra Tube Hold for add-ons.    Lavender Top   Result Value Ref Range    Extra Tube hold for add-on    Gold Top - SST   Result Value Ref Range    Extra Tube Hold for add-ons.      Medications   ketorolac (TORADOL) injection 15 mg (15 mg Intravenous Given 5/12/22 6594)   fentaNYL citrate (PF) (SUBLIMAZE) injection 25 mcg (25 mcg Intravenous Given 5/12/22 7248)     CT Abdomen Pelvis  Without Contrast    Result Date: 5/3/2022  Narrative: PROCEDURE: CT ABDOMEN PELVIS WO CONTRAST  COMPARISON: [facility], CT, ABD PEL W/O CONTRAST, 6/30/2020, 12:13.  INDICATIONS: GENERALIZED RIGHT FLANK PAIN  TECHNIQUE: CT images were created without intravenous contrast.   PROTOCOL:   Standard imaging protocol performed    RADIATION:   DLP: 395.4mGy*cm   Automated exposure control was utilized to minimize radiation dose.  FINDINGS:  No evidence of pneumonia or other acute findings in the base of the chest.  No evidence of pancreatitis or other acute process of the organs throughout the abdomen.  No bowel obstruction.  No evidence of colitis or diverticulitis.  Cholecystectomy clips are noted.  There is diffuse colonic diverticulosis but no evidence of diverticulitis.  No nephrolithiasis of either kidney.  Peripelvic cysts of both kidneys again noted incidentally.  No ascites or free air.  No abscess.   No evidence of acute process in the pelvis.  Bladder appears within normal limits.  No acute or aggressive osseous findings.      Impression:  Negative CT of the abdomen/pelvis as above.  No evidence of acute process, no findings to explain the right flank pain.     MOSHE JACOB MD       Electronically Signed and Approved By: MOSHE JACOB MD on 5/03/2022 at 7:30               Medical Decision Making:                     MDM  Number of Diagnoses or Management Options  Herpes zoster with complication  Right flank pain  Diagnosis management comments: I have spoken with the patient's. I have explained the patient´s condition, diagnoses and treatment plan based on the information available to me at this time. I have answered the patient questions and addressed any concerns. The patient has a good  understanding of the patient´s diagnosis, condition, and treatment plan as can be expected at this point. The vital signs have been stable. The patient´s condition is stable and appropriate for discharge from the  emergency department.      The patient will pursue further outpatient evaluation with the primary care physician or other designated or consulting physician as outlined in the discharge instructions. They are agreeable to this plan of care and follow-up instructions have been explained in detail. The patient has received these instructions in written format and have expressed an understanding of the discharge instructions. The patient is aware that any significant change in condition or worsening of symptoms should prompt an immediate return to this or the closest emergency department or call to 911.         Amount and/or Complexity of Data Reviewed  Clinical lab tests: reviewed and ordered  Obtain history from someone other than the patient: yes (Spouse)  Review and summarize past medical records: yes (Reviewed previous recent visits for same)    Risk of Complications, Morbidity, and/or Mortality  Presenting problems: low  Diagnostic procedures: low  Management options: low    Patient Progress  Patient progress: stable       Final diagnoses:   Right flank pain   Herpes zoster with complication        Disposition:  ED Disposition     ED Disposition   Discharge    Condition   Stable    Comment   --              Rubi Culver, APRN  05/12/22 0934

## 2022-06-03 PROCEDURE — 82150 ASSAY OF AMYLASE: CPT | Performed by: FAMILY MEDICINE

## 2022-06-03 PROCEDURE — 83690 ASSAY OF LIPASE: CPT | Performed by: FAMILY MEDICINE

## 2022-06-03 PROCEDURE — 80053 COMPREHEN METABOLIC PANEL: CPT | Performed by: FAMILY MEDICINE

## 2022-06-03 PROCEDURE — 85025 COMPLETE CBC W/AUTO DIFF WBC: CPT | Performed by: FAMILY MEDICINE

## 2022-09-12 ENCOUNTER — HOSPITAL ENCOUNTER (OUTPATIENT)
Dept: GENERAL RADIOLOGY | Facility: HOSPITAL | Age: 77
Discharge: HOME | End: 2022-09-12
Admitting: PHYSICIAN ASSISTANT

## 2022-09-12 ENCOUNTER — TRANSCRIBE ORDERS (OUTPATIENT)
Dept: GENERAL RADIOLOGY | Facility: HOSPITAL | Age: 77
End: 2022-09-12

## 2022-09-12 DIAGNOSIS — M54.42 LOW BACK PAIN WITH LEFT-SIDED SCIATICA, UNSPECIFIED BACK PAIN LATERALITY, UNSPECIFIED CHRONICITY: ICD-10-CM

## 2022-09-12 DIAGNOSIS — M54.42 LOW BACK PAIN WITH LEFT-SIDED SCIATICA, UNSPECIFIED BACK PAIN LATERALITY, UNSPECIFIED CHRONICITY: Primary | ICD-10-CM

## 2022-09-12 PROCEDURE — 72114 X-RAY EXAM L-S SPINE BENDING: CPT

## 2023-01-13 ENCOUNTER — OFFICE VISIT (OUTPATIENT)
Dept: UROLOGY | Facility: CLINIC | Age: 78
End: 2023-01-13
Payer: MEDICARE

## 2023-01-13 VITALS — HEIGHT: 62 IN | BODY MASS INDEX: 26.5 KG/M2 | WEIGHT: 144 LBS

## 2023-01-13 DIAGNOSIS — R31.9 HEMATURIA, UNSPECIFIED TYPE: Primary | ICD-10-CM

## 2023-01-13 DIAGNOSIS — N28.1 RENAL CYST: ICD-10-CM

## 2023-01-13 LAB
BILIRUB BLD-MCNC: ABNORMAL MG/DL
CLARITY, POC: CLEAR
COLOR UR: YELLOW
EXPIRATION DATE: ABNORMAL
GLUCOSE UR STRIP-MCNC: NEGATIVE MG/DL
KETONES UR QL: ABNORMAL
LEUKOCYTE EST, POC: NEGATIVE
Lab: ABNORMAL
NITRITE UR-MCNC: NEGATIVE MG/ML
PH UR: 5 [PH] (ref 5–8)
PROT UR STRIP-MCNC: ABNORMAL MG/DL
RBC # UR STRIP: ABNORMAL /UL
SP GR UR: 1.02 (ref 1–1.03)
UROBILINOGEN UR QL: ABNORMAL

## 2023-01-13 PROCEDURE — 99202 OFFICE O/P NEW SF 15 MIN: CPT | Performed by: UROLOGY

## 2023-01-13 PROCEDURE — 81003 URINALYSIS AUTO W/O SCOPE: CPT | Performed by: UROLOGY

## 2023-01-13 NOTE — PROGRESS NOTES
"Chief Complaint  Blood in Urine (Abnormal CT)    Subjective          Alina Velasquez presents to [facility] UROLOGY  History of Present Illness     The patient reports that she is doing well. She states that she had a UTI in 08/2022 and was placed on antibiotics. She reports that she had a scan done in 09/2022. She states that she was told that she had a stone in her kidney. She reports that she had a scan in 05/2022 because she was having flank pain on the right side. She states that she has osteoarthritis. The patient reports that when she gets the pain, it will come all the way around. She states that she was told that she had a cyst internally in her kidneys. She reports that she was told that there was nothing to worry about. She reports that she does not see blood in her urine.    Objective   Vital Signs:   Ht 157.5 cm (62\")   Wt 65.3 kg (144 lb)   BMI 26.34 kg/m²       Physical Exam  Vitals and nursing note reviewed.   Constitutional:       Appearance: Normal appearance. She is well-developed.   Pulmonary:      Effort: Pulmonary effort is normal.      Breath sounds: Normal air entry.   Neurological:      Mental Status: She is alert and oriented to person, place, and time.      Motor: Motor function is intact.   Psychiatric:         Mood and Affect: Mood normal.         Behavior: Behavior normal.          Result Review :       I personally reviewed the following imaging studies and labs today in the office and discussed findings with the patient.        Results for orders placed or performed in visit on 01/13/23   POC Urinalysis Dipstick, Automated    Specimen: Urine   Result Value Ref Range    Color Yellow Yellow, Straw, Dark Yellow, Aimee    Clarity, UA Clear Clear    Specific Gravity  1.025 1.005 - 1.030    pH, Urine 5.0 5.0 - 8.0    Leukocytes Negative Negative    Nitrite, UA Negative Negative    Protein, POC Trace (A) Negative mg/dL    Glucose, UA Negative Negative mg/dL    Ketones, " UA Trace (A) Negative    Urobilinogen, UA 0.2 E.U./dL Normal, 0.2 E.U./dL    Bilirubin Small (1+) (A) Negative    Blood, UA Moderate (A) Negative    Lot Number [id number]     Expiration Date 22,024           Data reviewed: Radiologic studies CT scan:   Study Result    Narrative & Impression   PROCEDURE:  CT ABDOMEN PELVIS WO CONTRAST     COMPARISON: [facility], CT, ABD PEL W/O CONTRAST, 6/30/2020, 12:13.     INDICATIONS:  GENERALIZED RIGHT FLANK PAIN     TECHNIQUE:    CT images were created without intravenous contrast.       PROTOCOL:     Standard imaging protocol performed                 RADIATION:      DLP: 395.4mGy*cm               Automated exposure control was utilized to minimize radiation dose.      FINDINGS:          No evidence of pneumonia or other acute findings in the base of the chest.     No evidence of pancreatitis or other acute process of the organs throughout the abdomen.  No bowel   obstruction.  No evidence of colitis or diverticulitis.  Cholecystectomy clips are noted.  There is   diffuse colonic diverticulosis but no evidence of diverticulitis.  No nephrolithiasis of either   kidney.  Peripelvic cysts of both kidneys again noted incidentally.     No ascites or free air.  No abscess.       No evidence of acute process in the pelvis.  Bladder appears within normal limits.     No acute or aggressive osseous findings.     IMPRESSION:               Negative CT of the abdomen/pelvis as above.  No evidence of acute process, no findings   to explain the right flank pain.           MOSHE JACOB MD         Electronically Signed and Approved By: MOSHE JACOB MD on 5/03/2022 at 7:30          I also reviewed her CT scan from [facility] in September 2022 which showed possible hydronephrosis.          Assessment and Plan    Diagnoses and all orders for this visit:    1. Hematuria, unspecified type (Primary)  Assessment & Plan:   We will see her back in 1 year.   She has some peripelvic cyst  that I think is what they were seeing on her CT scan from [place] , but they were not able to compare with her older ones, but she does not have any hydronephrosis and she does have some peripelvic cyst, which can look like hydronephrosis without a contrast scan.   We will plan on seeing her in 1 year, but she will not need a repeat work-up for the blood in her urine for a couple of years.      Orders:  -     POC Urinalysis Dipstick, Automated    2. Renal cyst          Follow Up       No follow-ups on file.  Patient was given instructions and counseling regarding her condition or for health maintenance advice. Please see specific information pulled into the AVS if appropriate.     Transcribed from ambient dictation for Luciana Dukes MD by Iris Gibson.  01/13/23   12:47 EST    Patient or patient representative verbalized consent to the visit recording.  I have personally performed the services described in this document as transcribed by the above individual, and it is both accurate and complete.

## 2023-01-13 NOTE — ASSESSMENT & PLAN NOTE
We will see her back in 1 year.   She has some peripelvic cyst that I think is what they were seeing on her CT scan from [place] , but they were not able to compare with her older ones, but she does not have any hydronephrosis and she does have some peripelvic cyst, which can look like hydronephrosis without a contrast scan.   We will plan on seeing her in 1 year, but she will not need a repeat work-up for the blood in her urine for a couple of years.

## 2023-02-08 ENCOUNTER — HOSPITAL ENCOUNTER (OUTPATIENT)
Dept: MAMMOGRAPHY | Facility: HOSPITAL | Age: 78
Discharge: HOME | End: 2023-02-08
Admitting: INTERNAL MEDICINE
Payer: MEDICARE

## 2023-02-08 DIAGNOSIS — Z12.31 ENCOUNTER FOR SCREENING MAMMOGRAM FOR BREAST CANCER: ICD-10-CM

## 2023-02-08 PROCEDURE — 77067 SCR MAMMO BI INCL CAD: CPT

## 2023-02-08 PROCEDURE — 77063 BREAST TOMOSYNTHESIS BI: CPT

## 2023-02-08 RX ORDER — AMLODIPINE BESYLATE 5 MG/1
1 TABLET ORAL DAILY
COMMUNITY
Start: 2023-01-31

## 2023-02-09 ENCOUNTER — OFFICE VISIT (OUTPATIENT)
Dept: ONCOLOGY | Facility: HOSPITAL | Age: 78
End: 2023-02-09
Payer: MEDICARE

## 2023-02-09 VITALS
WEIGHT: 145.06 LBS | RESPIRATION RATE: 16 BRPM | BODY MASS INDEX: 26.53 KG/M2 | DIASTOLIC BLOOD PRESSURE: 62 MMHG | OXYGEN SATURATION: 99 % | HEART RATE: 73 BPM | SYSTOLIC BLOOD PRESSURE: 130 MMHG | TEMPERATURE: 97.5 F

## 2023-02-09 DIAGNOSIS — Z85.3 HISTORY OF RIGHT BREAST CANCER: Primary | ICD-10-CM

## 2023-02-09 DIAGNOSIS — Z12.31 ENCOUNTER FOR SCREENING MAMMOGRAM FOR MALIGNANT NEOPLASM OF BREAST: ICD-10-CM

## 2023-02-09 PROCEDURE — 99213 OFFICE O/P EST LOW 20 MIN: CPT | Performed by: INTERNAL MEDICINE

## 2023-02-09 PROCEDURE — G0463 HOSPITAL OUTPT CLINIC VISIT: HCPCS

## 2023-02-09 NOTE — ASSESSMENT & PLAN NOTE
Patient is now 27 years out from her diagnosis and treatment.  I see no evidence of disease recurrence by history, physical examination or left mammogram.  I will see her back in 1 year for continued follow-up with left mammogram prior

## 2023-02-09 NOTE — PROGRESS NOTES
Chief Complaint  Results    Claudia Patel PA-C Morrow, Beth E, PA-C Subjective          Alina Velasquez presents to [facility]EMATOLOGY [facility] ONCOLOGY for yearly follow-up of breast cancer.  She is status post right mastectomy and adjuvant treatments.  She states that she has been doing well since her last visit.  She denies any issues from the remaining left breast.  She reports some mild chronic pain in the right mastectomy site which is no different than before.  She denies new masses or adenopathy, no unusual aches or pains.  She notes adequate appetite and energy level.    Oncology/Hematology History    No history exists.       Review of Systems   Constitutional: Positive for fatigue. Negative for appetite change, diaphoresis, fever, unexpected weight gain and unexpected weight loss.   HENT: Negative for hearing loss, mouth sores, sore throat, swollen glands, trouble swallowing and voice change.    Eyes: Negative for blurred vision.   Respiratory: Negative for cough, shortness of breath and wheezing.    Cardiovascular: Negative for chest pain and palpitations.   Gastrointestinal: Negative for abdominal pain, blood in stool, constipation, diarrhea, nausea and vomiting.   Endocrine: Negative for cold intolerance and heat intolerance.   Genitourinary: Negative for difficulty urinating, dysuria, frequency, hematuria and urinary incontinence.   Musculoskeletal: Negative for arthralgias, back pain and myalgias.   Skin: Negative for rash, skin lesions and wound.   Neurological: Negative for dizziness, seizures, weakness, numbness and headache.   Hematological: Does not bruise/bleed easily.   Psychiatric/Behavioral: Negative for depressed mood. The patient is not nervous/anxious.      Current Outpatient Medications on File Prior to Visit   Medication Sig Dispense Refill   • alendronate (FOSAMAX) 70 MG tablet TAKE 1 TABLET BY MOUTH EVERY MONDAY MORNING     • amLODIPine (NORVASC) 5 MG tablet Take  1 tablet by mouth Daily.     • atenolol (TENORMIN) 50 MG tablet      • azelastine (ASTELIN) 0.1 % nasal spray 2 sprays into the nostril(s) as directed by provider 2 (Two) Times a Day. Use in each nostril as directed 30 mL 0   • benazepril (LOTENSIN) 40 MG tablet      • Calcium Citrate-Vitamin D 250-200 MG-UNIT tablet Take  by mouth.     • cetirizine (zyrTEC) 10 MG tablet Take 10 mg by mouth Daily.     • clonazePAM (KlonoPIN) 0.5 MG tablet      • Cyanocobalamin (B-12 COMPLIANCE INJECTION IJ) Inject  as directed. Every two weeks     • diflunisal (DOLOBID) 500 MG tablet tablet Take 1 tablet by mouth 2 (Two) Times a Day As Needed (Pain). 20 tablet 0   • fluticasone (FLONASE) 50 MCG/ACT nasal spray      • gabapentin (NEURONTIN) 100 MG capsule Take 100 mg by mouth 3 (Three) Times a Day.     • glucosamine-chondroitin 500-400 MG capsule capsule Take  by mouth Daily.     • ibuprofen (ADVIL,MOTRIN) 400 MG tablet Take 400 mg by mouth Every 6 (Six) Hours As Needed for Mild Pain .     • Omeprazole (PRILOSEC PO)      • ondansetron ODT (ZOFRAN-ODT) 4 MG disintegrating tablet Place 1 tablet on the tongue 4 (Four) Times a Day As Needed for Nausea. 20 tablet 0   • pravastatin (PRAVACHOL) 20 MG tablet      • spironolactone (ALDACTONE) 25 MG tablet      • vitamin D3 125 MCG (5000 UT) capsule capsule Take 5,000 Units by mouth Daily.       No current facility-administered medications on file prior to visit.       Allergies   Allergen Reactions   • Contrast Dye (Echo Or Unknown Ct/Mr) Shortness Of Breath     Past Medical History:   Diagnosis Date   • Bloating 06/17/2016   • Breast cancer (HCC) 10/13/2017   • Essential hypertension 03/17/2017   • Generalized anxiety disorder 03/04/2019   • Hematuria 10/30/2017   • Herpes simplex 04/16/2018   • History of right breast cancer 2/8/2022   • Hyperlipemia 06/17/2016   • Neoplasm of breast, female, malignant (HCC) 1996    right breast   • Osteoarthritis, multiple sites 1994    throughout whole  body   • Polyarthritis of multiple sites 07/13/2017   • Subclinical hypothyroidism 06/17/2016   • Tinnitus 07/13/2017   • Vertigo 07/13/2017     Past Surgical History:   Procedure Laterality Date   • ANKLE SURGERY Left 1995    Lt ankle d/t broken ankle injury   • CATARACT EXTRACTION  2008    with lens implant   • COLONOSCOPY      2013- 2016- [id number]   • ENDOSCOPY      2013- 2020   • EYE SURGERY      eye implant- yes   • HYSTERECTOMY      partial    • JOINT REPLACEMENT      Joint surgery   • KNEE SURGERY Right 2012    Knee replacement- Dr. Gupta-Ortho   • LAPAROSCOPIC CHOLECYSTECTOMY  2015   • MASTECTOMY Right 1996    Dr. Trent Sun   • OTHER SURGICAL HISTORY      Artificial joints/limbs     Social History     Socioeconomic History   • Marital status:    Tobacco Use   • Smoking status: Never   • Smokeless tobacco: Never   Substance and Sexual Activity   • Alcohol use: Never     Comment: does not drink 3/4/2019- 1/29/2019- 1/21/2019- 10/16/2018   • Sexual activity: Defer     Family History   Problem Relation Age of Onset   • Hypertension Mother    • Arthritis Mother    • Osteoporosis Mother    • Arthritis Sister    • Osteoporosis Sister    • Heart disease Brother    • Hypertension Brother    • Diabetes Brother        Objective   Physical Exam  Vitals reviewed. Exam conducted with a chaperone present.   Constitutional:       General: She is not in acute distress.     Appearance: Normal appearance.   Cardiovascular:      Rate and Rhythm: Normal rate and regular rhythm.      Heart sounds: Normal heart sounds. No murmur heard.    No gallop.   Pulmonary:      Effort: Pulmonary effort is normal.   Chest:   Breasts:     Right: Skin change (See diagram) present.      Left: Normal.       Abdominal:      General: Abdomen is flat. Bowel sounds are normal.      Palpations: Abdomen is soft.   Musculoskeletal:      Cervical back: Neck supple.      Right lower leg: No edema.      Left lower leg: No edema.   Lymphadenopathy:       Cervical: No cervical adenopathy.      Upper Body:      Right upper body: No supraclavicular or axillary adenopathy.      Left upper body: No supraclavicular or axillary adenopathy.   Neurological:      Mental Status: She is alert and oriented to person, place, and time.   Psychiatric:         Mood and Affect: Mood normal.         Behavior: Behavior normal.         Vitals:    02/09/23 1356   BP: 130/62   Pulse: 73   Resp: 16   Temp: 97.5 °F (36.4 °C)   TempSrc: Temporal   SpO2: 99%   Weight: 65.8 kg (145 lb 1 oz)   PainSc: 0-No pain     ECOG score: 0         PHQ-9 Total Score:                    Result Review :   The following data was reviewed by: Goran Yepez MD on 02/09/2023:  Lab Results   Component Value Date    HGB 13.7 06/03/2022    HCT 41.0 06/03/2022    MCV 92.1 06/03/2022     06/03/2022    WBC 9.65 06/03/2022    NEUTROABS 6.47 06/03/2022    LYMPHSABS 2.11 06/03/2022    MONOSABS 0.68 06/03/2022    EOSABS 0.31 06/03/2022    BASOSABS 0.05 06/03/2022     Lab Results   Component Value Date    GLUCOSE 106 (H) 06/03/2022    BUN 16 06/03/2022    CREATININE 0.86 06/03/2022     06/03/2022    K 4.3 06/03/2022     06/03/2022    CO2 24.9 06/03/2022    CALCIUM 10.1 06/03/2022    PROTEINTOT 7.6 06/03/2022    ALBUMIN 4.50 06/03/2022    BILITOT 0.4 06/03/2022    ALKPHOS 66 06/03/2022    AST 29 06/03/2022    ALT 45 (H) 06/03/2022     Lab Results   Component Value Date    MG 1.84 01/26/2019    FREET4 1.1 10/06/2020    TSH 3.930 10/06/2020     No results found for: IRON, LABIRON, TRANSFERRIN, TIBC  No results found for: LDH, FERRITIN, [id number], FOLATE  No results found for: PSA, CEA, AFP, ,     Data reviewed: Radiologic studies Left mammogram reviewed-benign      Assessment and Plan    Diagnoses and all orders for this visit:    1. History of right breast cancer (Primary)  Assessment & Plan:  Patient is now 27 years out from her diagnosis and treatment.  I see no evidence of disease  recurrence by history, physical examination or left mammogram.  I will see her back in 1 year for continued follow-up with left mammogram prior    Orders:  -     Mammo screening modified with tomosynthesis left w CAD; Future    2. Encounter for screening mammogram for malignant neoplasm of breast  -     Mammo screening modified with tomosynthesis left w CAD; Future          Patient Follow Up: 1 year    Patient was given instructions and counseling regarding her condition or for health maintenance advice. Please see specific information pulled into the AVS if appropriate.     Goran Yepez MD    2/9/2023

## 2023-02-28 ENCOUNTER — APPOINTMENT (OUTPATIENT)
Dept: CT IMAGING | Facility: HOSPITAL | Age: 78
End: 2023-02-28
Payer: MEDICARE

## 2023-02-28 ENCOUNTER — HOSPITAL ENCOUNTER (EMERGENCY)
Facility: HOSPITAL | Age: 78
Discharge: HOME | End: 2023-02-28
Attending: EMERGENCY MEDICINE | Admitting: EMERGENCY MEDICINE
Payer: MEDICARE

## 2023-02-28 VITALS
HEART RATE: 57 BPM | TEMPERATURE: 98.2 F | DIASTOLIC BLOOD PRESSURE: 61 MMHG | HEIGHT: 63 IN | OXYGEN SATURATION: 97 % | RESPIRATION RATE: 11 BRPM | BODY MASS INDEX: 24.3 KG/M2 | SYSTOLIC BLOOD PRESSURE: 125 MMHG | WEIGHT: 137.13 LBS

## 2023-02-28 DIAGNOSIS — M54.2 NECK PAIN: Primary | ICD-10-CM

## 2023-02-28 DIAGNOSIS — M50.30 DDD (DEGENERATIVE DISC DISEASE), CERVICAL: ICD-10-CM

## 2023-02-28 DIAGNOSIS — S16.1XXA CERVICAL STRAIN, ACUTE, INITIAL ENCOUNTER: ICD-10-CM

## 2023-02-28 DIAGNOSIS — M62.838 MUSCLE SPASM: ICD-10-CM

## 2023-02-28 LAB
ALBUMIN SERPL-MCNC: 4.6 G/DL (ref 3.5–5.2)
ALBUMIN/GLOB SERPL: 1.3 G/DL
ALP SERPL-CCNC: 89 U/L (ref 39–117)
ALT SERPL W P-5'-P-CCNC: 32 U/L (ref 1–33)
ANION GAP SERPL CALCULATED.3IONS-SCNC: 10.1 MMOL/L (ref 5–15)
AST SERPL-CCNC: 24 U/L (ref 1–32)
BASOPHILS # BLD AUTO: 0.06 10*3/MM3 (ref 0–0.2)
BASOPHILS NFR BLD AUTO: 0.6 % (ref 0–1.5)
BILIRUB SERPL-MCNC: 0.5 MG/DL (ref 0–1.2)
BUN SERPL-MCNC: 18 MG/DL (ref 8–23)
BUN/CREAT SERPL: 19.6 (ref 7–25)
CALCIUM SPEC-SCNC: 10.1 MG/DL (ref 8.6–10.5)
CHLORIDE SERPL-SCNC: 99 MMOL/L (ref 98–107)
CO2 SERPL-SCNC: 25.9 MMOL/L (ref 22–29)
CREAT SERPL-MCNC: 0.92 MG/DL (ref 0.57–1)
DEPRECATED RDW RBC AUTO: 40.3 FL (ref 37–54)
EGFRCR SERPLBLD CKD-EPI 2021: 63.9 ML/MIN/1.73
EOSINOPHIL # BLD AUTO: 0.26 10*3/MM3 (ref 0–0.4)
EOSINOPHIL NFR BLD AUTO: 2.7 % (ref 0.3–6.2)
ERYTHROCYTE [DISTWIDTH] IN BLOOD BY AUTOMATED COUNT: 12.4 % (ref 12.3–15.4)
GLOBULIN UR ELPH-MCNC: 3.5 GM/DL
GLUCOSE SERPL-MCNC: 98 MG/DL (ref 65–99)
HCT VFR BLD AUTO: 42.7 % (ref 34–46.6)
HGB BLD-MCNC: 14.9 G/DL (ref 12–15.9)
HOLD SPECIMEN: NORMAL
HOLD SPECIMEN: NORMAL
IMM GRANULOCYTES # BLD AUTO: 0.05 10*3/MM3 (ref 0–0.05)
IMM GRANULOCYTES NFR BLD AUTO: 0.5 % (ref 0–0.5)
LYMPHOCYTES # BLD AUTO: 2.24 10*3/MM3 (ref 0.7–3.1)
LYMPHOCYTES NFR BLD AUTO: 23.1 % (ref 19.6–45.3)
MCH RBC QN AUTO: 30.7 PG (ref 26.6–33)
MCHC RBC AUTO-ENTMCNC: 34.9 G/DL (ref 31.5–35.7)
MCV RBC AUTO: 87.9 FL (ref 79–97)
MONOCYTES # BLD AUTO: 0.68 10*3/MM3 (ref 0.1–0.9)
MONOCYTES NFR BLD AUTO: 7 % (ref 5–12)
NEUTROPHILS NFR BLD AUTO: 6.4 10*3/MM3 (ref 1.7–7)
NEUTROPHILS NFR BLD AUTO: 66.1 % (ref 42.7–76)
NRBC BLD AUTO-RTO: 0 /100 WBC (ref 0–0.2)
PLATELET # BLD AUTO: 317 10*3/MM3 (ref 140–450)
PMV BLD AUTO: 9.3 FL (ref 6–12)
POTASSIUM SERPL-SCNC: 4 MMOL/L (ref 3.5–5.2)
PROT SERPL-MCNC: 8.1 G/DL (ref 6–8.5)
RBC # BLD AUTO: 4.86 10*6/MM3 (ref 3.77–5.28)
SODIUM SERPL-SCNC: 135 MMOL/L (ref 136–145)
WBC NRBC COR # BLD: 9.69 10*3/MM3 (ref 3.4–10.8)
WHOLE BLOOD HOLD COAG: NORMAL
WHOLE BLOOD HOLD SPECIMEN: NORMAL

## 2023-02-28 PROCEDURE — 97140 MANUAL THERAPY 1/> REGIONS: CPT | Performed by: PHYSICAL THERAPIST

## 2023-02-28 PROCEDURE — 97110 THERAPEUTIC EXERCISES: CPT | Performed by: PHYSICAL THERAPIST

## 2023-02-28 PROCEDURE — 25010000002 KETOROLAC TROMETHAMINE PER 15 MG: Performed by: EMERGENCY MEDICINE

## 2023-02-28 PROCEDURE — 99283 EMERGENCY DEPT VISIT LOW MDM: CPT

## 2023-02-28 PROCEDURE — 96374 THER/PROPH/DIAG INJ IV PUSH: CPT

## 2023-02-28 PROCEDURE — 85025 COMPLETE CBC W/AUTO DIFF WBC: CPT

## 2023-02-28 PROCEDURE — 70450 CT HEAD/BRAIN W/O DYE: CPT

## 2023-02-28 PROCEDURE — 80053 COMPREHEN METABOLIC PANEL: CPT

## 2023-02-28 PROCEDURE — 72125 CT NECK SPINE W/O DYE: CPT

## 2023-02-28 PROCEDURE — 97161 PT EVAL LOW COMPLEX 20 MIN: CPT | Performed by: PHYSICAL THERAPIST

## 2023-02-28 RX ORDER — KETOROLAC TROMETHAMINE 15 MG/ML
15 INJECTION, SOLUTION INTRAMUSCULAR; INTRAVENOUS ONCE
Status: COMPLETED | OUTPATIENT
Start: 2023-02-28 | End: 2023-02-28

## 2023-02-28 RX ORDER — SODIUM CHLORIDE 0.9 % (FLUSH) 0.9 %
10 SYRINGE (ML) INJECTION AS NEEDED
Status: DISCONTINUED | OUTPATIENT
Start: 2023-02-28 | End: 2023-02-28 | Stop reason: HOSPADM

## 2023-02-28 RX ADMIN — KETOROLAC TROMETHAMINE 15 MG: 15 INJECTION, SOLUTION INTRAMUSCULAR; INTRAVENOUS at 10:13

## 2023-04-04 ENCOUNTER — TELEPHONE (OUTPATIENT)
Dept: ONCOLOGY | Facility: HOSPITAL | Age: 78
End: 2023-04-04

## 2023-04-04 NOTE — TELEPHONE ENCOUNTER
Caller: Alina Velasquez    Relationship: Self    Best call back number: [phone]  CELL [phone]    What is the best time to reach you: ANYTIME    Who are you requesting to speak with (clinical staff, provider,  specific staff member): CLINICAL    What was the call regarding: PT CALLING TO REQUEST PRESCRIPTIONS FOR PROSTHESIS AND A COUPLE BRAS    Do you require a callback: YES

## 2023-04-13 ENCOUNTER — TELEPHONE (OUTPATIENT)
Dept: ONCOLOGY | Facility: HOSPITAL | Age: 78
End: 2023-04-13
Payer: MEDICARE

## 2023-04-13 NOTE — TELEPHONE ENCOUNTER
Spoke with patient and she is is requesting a script for breast prothesis and bra, she wants to use a shop in [place]. Advised her that I will call when I have it ready for her to . She verbalized understanding.

## 2023-04-13 NOTE — TELEPHONE ENCOUNTER
Caller: Alina Velasquez    Relationship: Self    Best call back number: [phone]    What is the best time to reach you: ASAP    Who are you requesting to speak with (clinical staff, provider,  specific staff member): CLINICAL    What was the call regarding: PT REQUESTING CALL BACK FOR PRESCRIPTIONS FOR BRAS AND PROSTHESIS     Do you require a callback: YES PLEASE

## 2023-08-24 ENCOUNTER — OFFICE VISIT (OUTPATIENT)
Dept: ORTHOPEDIC SURGERY | Facility: CLINIC | Age: 78
End: 2023-08-24
Payer: MEDICARE

## 2023-08-24 VITALS
HEART RATE: 75 BPM | OXYGEN SATURATION: 98 % | BODY MASS INDEX: 26.81 KG/M2 | HEIGHT: 61 IN | WEIGHT: 142 LBS | SYSTOLIC BLOOD PRESSURE: 135 MMHG | DIASTOLIC BLOOD PRESSURE: 85 MMHG

## 2023-08-24 DIAGNOSIS — M25.512 LEFT SHOULDER PAIN, UNSPECIFIED CHRONICITY: Primary | ICD-10-CM

## 2023-08-24 DIAGNOSIS — M25.511 RIGHT SHOULDER PAIN, UNSPECIFIED CHRONICITY: ICD-10-CM

## 2023-08-24 NOTE — PROGRESS NOTES
"Chief Complaint  Pain and Initial Evaluation of the Left Shoulder and Pain and Initial Evaluation of the Right Shoulder     Subjective      Alina Velasquez presents to [facility]TH[facility] for initial evaluation of bilateral shoulders. She has had pain in bilateral shoulders for awhile.  She notes the left one is worse then the right.  She has had a MRI years ago that showed a left rotator cuff tear she treated conservatively.  She has pain in the shoulders and the pain goes into her neck.  She has more pain in the trapezius.  She does have pain in the left arm that goes down to her fingertip.  She uses tylenol for pain. She has had physical therapy in the past. She has limited ROM in the neck.     Allergies   Allergen Reactions    Contrast Dye (Echo Or Unknown Ct/Mr) Shortness Of Breath        Social History     Socioeconomic History    Marital status:    Tobacco Use    Smoking status: Never    Smokeless tobacco: Never   Vaping Use    Vaping Use: Never used   Substance and Sexual Activity    Alcohol use: Never     Comment: does not drink 3/4/2019- 1/29/2019- 1/21/2019- 10/16/2018    Sexual activity: Defer        I reviewed the patient's chief complaint, history of present illness, review of systems, past medical history, surgical history, family history, social history, medications, and allergy list.     Review of Systems     Constitutional: Denies fevers, chills, weight loss  Cardiovascular: Denies chest pain, shortness of breath  Skin: Denies rashes, acute skin changes  Neurologic: Denies headache, loss of consciousness        Vital Signs:   /85   Pulse 75   Ht 154.9 cm (61\")   Wt 64.4 kg (142 lb)   SpO2 98%   BMI 26.83 kg/mý          Physical Exam  General: Alert. No acute distress    Ortho Exam        BILATERAL SHOULDERS Forward flexion 160. Abduction 90. External rotation 40. Internal rotation back pocket. Negative Cross body adduction. Supraspinatus strength 4/5. " Infraspinatus Strength 4/5. Infrared subscap 4/5. Negative Velasquez. Negative Neer. Negative Apprehension. Negative Lift off. (Negative Obriens. Sensation intact to light touch, median, radial, ulnar nerve. Positive AIN, PIN, ulnar nerve motor. Positive pulses. Negative Impingement signs. Good strength in triceps, biceps, deltoid, wrist extensors and wrist flexors.  She has limited neck ROM.       Procedures      Imaging Results (Most Recent)       Procedure Component Value Units Date/Time    XR Scapula Left [[id number]] Resulted: 08/24/23 1133     Updated: 08/24/23 1133    Narrative:      X-Ray Report:  Left scapula X-Ray  Indication: Evaluation of the left scapula  AP/Lateral view(s)  Findings: Mild arthritis.   Prior studies available for comparison: no       XR Scapula Right [[id number]] Resulted: 08/24/23 1132     Updated: 08/24/23 1133    Narrative:      X-Ray Report:  Right scapula X-Ray  Indication: Evaluation of the right scapula  AP/Lateral view(s)  Findings: Mild arthritis  Prior studies available for comparison: no                   Result Review :     X-Ray Report:  Right scapula X-Ray  Indication: Evaluation of the right scapula  AP/Lateral view(s)  Findings: Mild arthritis  Prior studies available for comparison: no     X-Ray Report:  Left scapula X-Ray  Indication: Evaluation of the left scapula  AP/Lateral view(s)  Findings: Mild arthritis.   Prior studies available for comparison: no             Assessment and Plan     Diagnoses and all orders for this visit:    1. Left shoulder pain, unspecified chronicity (Primary)  -     XR Scapula Left  -     Ambulatory Referral to Neurosurgery  -     Ambulatory Referral to Physical Therapy Evaluate and treat    2. Right shoulder pain, unspecified chronicity  -     XR Scapula Right  -     Ambulatory Referral to Neurosurgery  -     Ambulatory Referral to Physical Therapy Evaluate and treat    3. Cervical radiculopathy        Discussed the treatment plan with the  patient. I reviewed the X-rays that were obtained today with the patient.     Prescribed physical therapy.      Refer to Dr López.     Call or return if worsening symptoms.    Follow Up     4-6 weeks and assess ROM and pain.       Patient was given instructions and counseling regarding her condition or for health maintenance advice. Please see specific information pulled into the AVS if appropriate.     Scribed for Theodore Gupta MD by Mandi Hayward MA.  08/24/23   09:49 EDT    I have personally performed the services described in this document as scribed by the above individual and it is both accurate and complete. Theodore Gupta MD 08/30/23

## 2023-08-28 ENCOUNTER — TELEPHONE (OUTPATIENT)
Dept: ORTHOPEDIC SURGERY | Facility: CLINIC | Age: 78
End: 2023-08-28

## 2023-08-28 NOTE — TELEPHONE ENCOUNTER
Caller: ELIOT   Relationship to Patient: SELF  Phone Number: [phone]    Reason for Call: PATENT CALLING STATING THAT NEURO CALLED HER AND INFORMED HER THAT THE REFERRAL THAT WAS SENT FOR SHOULDER PAIN NEEDS TO BE CHANGED TO CERVICAL PAIN OR THEY WONT BE ABLE TO SEE HER

## 2023-09-26 ENCOUNTER — OFFICE VISIT (OUTPATIENT)
Dept: NEUROSURGERY | Facility: CLINIC | Age: 78
End: 2023-09-26
Payer: MEDICARE

## 2023-09-26 VITALS
DIASTOLIC BLOOD PRESSURE: 62 MMHG | WEIGHT: 141 LBS | HEIGHT: 61 IN | HEART RATE: 72 BPM | BODY MASS INDEX: 26.62 KG/M2 | SYSTOLIC BLOOD PRESSURE: 132 MMHG

## 2023-09-26 DIAGNOSIS — G44.86 CERVICOGENIC HEADACHE: ICD-10-CM

## 2023-09-26 DIAGNOSIS — M54.2 CERVICALGIA: ICD-10-CM

## 2023-09-26 DIAGNOSIS — M47.812 CERVICAL SPONDYLOSIS WITHOUT MYELOPATHY: Primary | ICD-10-CM

## 2023-09-26 NOTE — PATIENT INSTRUCTIONS
-Referral to Neurology for headaches  -Consider cervical trigger point injections  -Follow up as needed

## 2023-09-26 NOTE — PROGRESS NOTES
"Chief Complaint  Neck Pain (Posterior neck pain, into shoulders and left shoulder. Limited ROM. Pt has been going to PT and had some improvement.)    Subjective          Alina Velasquez who is a 78 y.o. year old female who presents to [facility]GERY for evaluation of cervical spine.      The patient complains of pain located in the cervical spine.  Patients states the pain has been present for 2 years.  The pain came on gradually.  Pain is primarily in the posterior cervical spine, into the occiput. She has constant daily headache. The pain scale level is 4-5/10.  The pain  will occasionally radiate into the arms and fingers .  The pain is constant and waxing/waning and described as dull and aching.  The pain is worse at no particular time of day. Patient states bending, head turning, and driving makes the pain worse.  Patient states rest makes the pain better.    Associated Symptoms Include: Numbness and Tingling  Conservative Interventions Include: Physical Therapy that was somewhat effective. She recently resumed this after seeing Dr. Gupta.     Was this the result of an injury or accident? : No    History of Previous Spinal Surgery?: No    Nicotine use:  non-smoker    BMI: Body mass index is 26.64 kg/m².      Review of Systems   Musculoskeletal:  Positive for myalgias, neck pain and neck stiffness.   Neurological:  Positive for numbness.   All other systems reviewed and are negative.     Objective   Vital Signs:   /62 (BP Location: Left arm)   Pulse 72   Ht 154.9 cm (61\")   Wt 64 kg (141 lb)   BMI 26.64 kg/m²       Physical Exam  Vitals reviewed.   Constitutional:       Appearance: Normal appearance.   Musculoskeletal:      Right shoulder: No tenderness. Normal range of motion.      Left shoulder: No tenderness. Normal range of motion.      Cervical back: Tenderness present. Pain with movement present. Decreased range of motion.   Neurological:      Mental " Status: She is alert and oriented to person, place, and time.      Motor: Motor strength is normal.      Gait: Gait is intact.      Deep Tendon Reflexes:      Reflex Scores:       Tricep reflexes are 2+ on the right side and 2+ on the left side.       Bicep reflexes are 2+ on the right side and 2+ on the left side.       Brachioradialis reflexes are 2+ on the right side and 2+ on the left side.     Neurologic Exam     Mental Status   Oriented to person, place, and time.   Level of consciousness: alert    Motor Exam   Muscle bulk: normal  Overall muscle tone: normal    Strength   Strength 5/5 throughout.     Sensory Exam   Light touch normal.     Gait, Coordination, and Reflexes     Gait  Gait: normal    Reflexes   Right brachioradialis: 2+  Left brachioradialis: 2+  Right biceps: 2+  Left biceps: 2+  Right triceps: 2+  Left triceps: 2+  Right Coles: absent  Left Coles: absent     Result Review :       Data reviewed : Radiologic studies CT Cervical Spine on 2/28/23 demonstrates mild degenerative changes. No significant spinal canal or foraminal stenosis.            Assessment and Plan    Diagnoses and all orders for this visit:    1. Cervical spondylosis without myelopathy (Primary)    2. Cervicalgia  -     Ambulatory Referral to Neurology    3. Cervicogenic headache  -     Ambulatory Referral to Neurology    Pt presenting for evaluation of neck pain with headaches. We reviewed her CT Cervical Spine, demonstrating cervical spondylosis without significant spinal canal or foraminal stenosis. We discussed that surgery would not improve her neck pain. Her pain is primarily musculoskeletal. I do think she would benefit from cervical trigger point injections. She will consider this. I will refer her to Neurology due to her headaches.     She will follow up as needed.     I spent 40 minutes caring for Alina on this date of service. This time includes time spent by me in the following activities:preparing for the visit,  reviewing tests, obtaining and/or reviewing a separately obtained history, performing a medically appropriate examination and/or evaluation , counseling and educating the patient/family/caregiver, documenting information in the medical record, and independently interpreting results and communicating that information with the patient/family/caregiver.    Follow Up   Return if symptoms worsen or fail to improve.  Patient was given instructions and counseling regarding her condition or for health maintenance advice.     -Referral to Neurology for headaches  -Consider cervical trigger point injections  -Follow up as needed

## 2023-12-14 ENCOUNTER — TRANSCRIBE ORDERS (OUTPATIENT)
Dept: ADMINISTRATIVE | Facility: HOSPITAL | Age: 78
End: 2023-12-14
Payer: MEDICARE

## 2023-12-14 DIAGNOSIS — R42 DIZZINESS: Primary | ICD-10-CM

## 2024-01-24 ENCOUNTER — OFFICE VISIT (OUTPATIENT)
Dept: UROLOGY | Facility: CLINIC | Age: 79
End: 2024-01-24
Payer: MEDICARE

## 2024-01-24 VITALS
BODY MASS INDEX: 26.24 KG/M2 | HEIGHT: 62 IN | WEIGHT: 142.6 LBS | SYSTOLIC BLOOD PRESSURE: 152 MMHG | DIASTOLIC BLOOD PRESSURE: 92 MMHG

## 2024-01-24 DIAGNOSIS — R31.9 HEMATURIA, UNSPECIFIED TYPE: Primary | ICD-10-CM

## 2024-01-24 LAB
BILIRUB BLD-MCNC: NEGATIVE MG/DL
CLARITY, POC: CLEAR
COLOR UR: YELLOW
EXPIRATION DATE: NORMAL
GLUCOSE UR STRIP-MCNC: NEGATIVE MG/DL
KETONES UR QL: NEGATIVE
LEUKOCYTE EST, POC: NEGATIVE
Lab: NORMAL
NITRITE UR-MCNC: NEGATIVE MG/ML
PH UR: 7 [PH] (ref 5–8)
PROT UR STRIP-MCNC: NEGATIVE MG/DL
RBC # UR STRIP: NEGATIVE /UL
SP GR UR: 1.01 (ref 1–1.03)
UROBILINOGEN UR QL: NORMAL

## 2024-01-24 RX ORDER — GUAIFENESIN 600 MG/1
600 TABLET, EXTENDED RELEASE ORAL 2 TIMES DAILY
COMMUNITY
Start: 2024-01-11

## 2024-01-24 RX ORDER — TRIAMTERENE AND HYDROCHLOROTHIAZIDE 37.5; 25 MG/1; MG/1
1 TABLET ORAL DAILY
COMMUNITY
Start: 2024-01-09

## 2024-01-24 NOTE — PROGRESS NOTES
"Chief Complaint  Follow-up (hematuria)    Subjective          Alina Velasquez presents to [facility] UROLOG[facility]    History of Present Illness  Patient has no new complaints.  She denies any gross hematuria over the last year.  No issues with infections.  She actually has no blood in her urine today on urinalysis.      Objective   Vital Signs:   /92   Ht 157.5 cm (62\")   Wt 64.7 kg (142 lb 9.6 oz)   BMI 26.08 kg/m²       Physical Exam  Vitals and nursing note reviewed.   Constitutional:       Appearance: Normal appearance. She is well-developed.   Pulmonary:      Effort: Pulmonary effort is normal.      Breath sounds: Normal air entry.   Neurological:      Mental Status: She is alert and oriented to person, place, and time.      Motor: Motor function is intact.   Psychiatric:         Mood and Affect: Mood normal.         Behavior: Behavior normal.          Result Review :   The following data was reviewed by: Luciana Dukes MD on 01/24/2024:    Results for orders placed or performed in visit on 01/24/24   POC Urinalysis Dipstick, Automated    Specimen: Urine   Result Value Ref Range    Color Yellow Yellow, Straw, Dark Yellow, Aimee    Clarity, UA Clear Clear    Specific Gravity  1.010 1.005 - 1.030    pH, Urine 7.0 5.0 - 8.0    Leukocytes Negative Negative    Nitrite, UA Negative Negative    Protein, POC Negative Negative mg/dL    Glucose, UA Negative Negative mg/dL    Ketones, UA Negative Negative    Urobilinogen, UA 0.2 E.U./dL Normal, 0.2 E.U./dL    Bilirubin Negative Negative    Blood, UA Negative Negative    Lot Number [id number]     Expiration Date 12/31/24               Assessment and Plan    Diagnoses and all orders for this visit:    1. Hematuria, unspecified type (Primary)  -     POC Urinalysis Dipstick, Automated    Denies any gross hematuria.  She has not had any issues of infections.  Follow-up in 1 year.        Follow Up       No follow-ups on file.  Patient was given " instructions and counseling regarding her condition or for health maintenance advice. Please see specific information pulled into the AVS if appropriate.

## 2024-02-09 ENCOUNTER — HOSPITAL ENCOUNTER (OUTPATIENT)
Dept: MAMMOGRAPHY | Facility: HOSPITAL | Age: 79
Discharge: HOME | End: 2024-02-09
Admitting: INTERNAL MEDICINE
Payer: MEDICARE

## 2024-02-09 ENCOUNTER — TELEPHONE (OUTPATIENT)
Dept: ONCOLOGY | Facility: HOSPITAL | Age: 79
End: 2024-02-09
Payer: MEDICARE

## 2024-02-09 DIAGNOSIS — Z12.31 ENCOUNTER FOR SCREENING MAMMOGRAM FOR MALIGNANT NEOPLASM OF BREAST: ICD-10-CM

## 2024-02-09 DIAGNOSIS — Z85.3 HISTORY OF RIGHT BREAST CANCER: ICD-10-CM

## 2024-02-09 PROCEDURE — 77063 BREAST TOMOSYNTHESIS BI: CPT

## 2024-02-09 PROCEDURE — 77067 SCR MAMMO BI INCL CAD: CPT

## 2024-02-09 NOTE — TELEPHONE ENCOUNTER
Caller: Alina Velasquez    Relationship to patient: Self    Best call back number: [phone]     Chief complaint: DEATH IN FAMILY    Type of visit: FOLLOW UP    Requested date: ANY OTHER DATE/TIME    If rescheduling, when is the original appointment: 02/13/24    Additional notes: PLEASE CALL WITH NEW APPT DATE/TIME

## 2024-02-12 ENCOUNTER — TELEPHONE (OUTPATIENT)
Dept: ONCOLOGY | Facility: HOSPITAL | Age: 79
End: 2024-02-12
Payer: MEDICARE

## 2024-02-12 DIAGNOSIS — R92.8 ABNORMAL MAMMOGRAM OF LEFT BREAST: Primary | ICD-10-CM

## 2024-02-20 ENCOUNTER — TRANSCRIBE ORDERS (OUTPATIENT)
Dept: ADMINISTRATIVE | Facility: HOSPITAL | Age: 79
End: 2024-02-20
Payer: MEDICARE

## 2024-02-20 DIAGNOSIS — R10.13 EPIGASTRIC PAIN: Primary | ICD-10-CM

## 2024-02-20 DIAGNOSIS — R11.0 NAUSEA: ICD-10-CM

## 2024-02-21 ENCOUNTER — HOSPITAL ENCOUNTER (OUTPATIENT)
Dept: MAMMOGRAPHY | Facility: HOSPITAL | Age: 79
Discharge: HOME | End: 2024-02-21
Payer: MEDICARE

## 2024-02-21 ENCOUNTER — HOSPITAL ENCOUNTER (OUTPATIENT)
Dept: ULTRASOUND IMAGING | Facility: HOSPITAL | Age: 79
Discharge: HOME | End: 2024-02-21
Payer: MEDICARE

## 2024-02-21 DIAGNOSIS — R92.8 ABNORMAL MAMMOGRAM OF LEFT BREAST: ICD-10-CM

## 2024-02-21 PROCEDURE — 77065 DX MAMMO INCL CAD UNI: CPT

## 2024-02-21 PROCEDURE — G0279 TOMOSYNTHESIS, MAMMO: HCPCS

## 2024-02-22 ENCOUNTER — OFFICE VISIT (OUTPATIENT)
Dept: ONCOLOGY | Facility: HOSPITAL | Age: 79
End: 2024-02-22
Payer: MEDICARE

## 2024-02-22 VITALS
DIASTOLIC BLOOD PRESSURE: 58 MMHG | BODY MASS INDEX: 25.68 KG/M2 | TEMPERATURE: 96.8 F | OXYGEN SATURATION: 100 % | RESPIRATION RATE: 18 BRPM | HEIGHT: 62 IN | SYSTOLIC BLOOD PRESSURE: 136 MMHG | WEIGHT: 139.55 LBS | HEART RATE: 69 BPM

## 2024-02-22 DIAGNOSIS — M54.2 CERVICALGIA: ICD-10-CM

## 2024-02-22 DIAGNOSIS — R42 DIZZINESS: ICD-10-CM

## 2024-02-22 DIAGNOSIS — Z85.3 HISTORY OF RIGHT BREAST CANCER: Primary | ICD-10-CM

## 2024-02-22 DIAGNOSIS — R92.8 ABNORMAL MAMMOGRAM OF LEFT BREAST: Primary | ICD-10-CM

## 2024-02-22 DIAGNOSIS — R91.1 RIGHT UPPER LOBE PULMONARY NODULE: ICD-10-CM

## 2024-02-22 PROCEDURE — G0463 HOSPITAL OUTPT CLINIC VISIT: HCPCS | Performed by: INTERNAL MEDICINE

## 2024-02-22 NOTE — ASSESSMENT & PLAN NOTE
Identified on CT scan of the neck from last year.  Radiology recommended formal CT chest but she never had the study done.  I will order the CT of the chest.  Noncontrasted since she has an allergy to contrast.  Further recommendations based on results.

## 2024-02-22 NOTE — ASSESSMENT & PLAN NOTE
Patient reports symptoms of dizziness and vertigo.  These do improve with physical therapy but it does not last very long.  I will order MRI brain.  Further recommendations based on results.  Consider further physical therapy since it helped in the past

## 2024-02-22 NOTE — ASSESSMENT & PLAN NOTE
Patient complains of ongoing cervicalgia.  She has known degenerative changes in the neck but feels like they have gotten worse.  I will order MRI of the cervical spine.

## 2024-02-22 NOTE — ASSESSMENT & PLAN NOTE
Patient is now 28 years out from her diagnosis and mastectomy.  I see no evidence of disease recurrence by her history, physical examination or recent mammogram.  The left diagnostic mammogram most consistent with a small intramammary lymph node which had benign architecture.  Radiology recommended a 6-month repeat mammogram to ensure stability which will be ordered.  I will see her back after

## 2024-02-22 NOTE — PROGRESS NOTES
Chief Complaint  History of right breast cancer    Claudia Patel PA-C Morrow, Beth E, PA-C Subjective          Alina Velasquez presents to [facility] for follow-up of breast cancer.  She is now 28 years out from her diagnosis and right mastectomy.  She denies any concerns from the mastectomy site or remaining breast.  She does report that she has been having more trouble with chronic neck pain and dizziness.  She has done physical therapy which did seem to help briefly but the symptoms returned.  She does state that sometimes makes her feel unsteady on her feet.  She reports normal appetite and her weight is maintained.  She has good energy level.    Oncology/Hematology History    No history exists.       Review of Systems   Constitutional:  Negative for appetite change, diaphoresis, fatigue, fever, unexpected weight gain and unexpected weight loss.   HENT:  Negative for hearing loss, mouth sores, sore throat, swollen glands, trouble swallowing and voice change.    Eyes:  Negative for blurred vision.   Respiratory:  Negative for cough, shortness of breath and wheezing.    Cardiovascular:  Negative for chest pain and palpitations.   Gastrointestinal:  Negative for abdominal pain, blood in stool, constipation, diarrhea, nausea and vomiting.   Endocrine: Negative for cold intolerance and heat intolerance.   Genitourinary:  Negative for difficulty urinating, dysuria, frequency, hematuria and urinary incontinence.   Musculoskeletal:  Negative for arthralgias, back pain and myalgias.   Skin:  Negative for rash, skin lesions and wound.   Neurological:  Negative for dizziness, seizures, weakness, numbness and headache.   Hematological:  Does not bruise/bleed easily.   Psychiatric/Behavioral:  Negative for depressed mood. The patient is not nervous/anxious.      Current Outpatient Medications on File Prior to Visit   Medication Sig Dispense Refill    amLODIPine (NORVASC) 5 MG  tablet Take 1 tablet by mouth Daily.      atenolol (TENORMIN) 50 MG tablet       cetirizine (zyrTEC) 10 MG tablet Take 1 tablet by mouth Daily.      clonazePAM (KlonoPIN) 0.5 MG tablet Take 1 tablet by mouth At Night As Needed.      Cyanocobalamin (B-12 COMPLIANCE INJECTION IJ) Inject  as directed. Every two weeks      Dentifrices (BIOTENE DRY MOUTH CARE DT) Daily As Needed.      fluticasone (FLONASE) 50 MCG/ACT nasal spray       Mucus Relief 600 MG 12 hr tablet Take 1 tablet by mouth 2 (Two) Times a Day. As needed      Omeprazole (PRILOSEC PO)       pravastatin (PRAVACHOL) 20 MG tablet       Calcium Citrate-Vitamin D 250-200 MG-UNIT tablet Take  by mouth. (Patient not taking: Reported on 2/22/2024)      tiZANidine (ZANAFLEX) 4 MG tablet Take 1 tablet by mouth Every 8 (Eight) Hours As Needed for Muscle Spasms. (Patient not taking: Reported on 1/24/2024) 10 tablet 0    triamterene-hydrochlorothiazide (MAXZIDE-25) 37.5-25 MG per tablet Take 1 tablet by mouth Daily. (Patient not taking: Reported on 2/22/2024)       No current facility-administered medications on file prior to visit.       Allergies   Allergen Reactions    Contrast Dye (Echo Or Unknown Ct/Mr) Shortness Of Breath     Past Medical History:   Diagnosis Date    Bloating 06/17/2016    Breast cancer 10/13/2017    Essential hypertension 03/17/2017    Generalized anxiety disorder 03/04/2019    Hematuria 10/30/2017    Herpes simplex 04/16/2018    History of right breast cancer 2/8/2022    Hyperlipemia 06/17/2016    Neoplasm of breast, female, malignant 1996    right breast    Osteoarthritis, multiple sites 1994    throughout whole body    Polyarthritis of multiple sites 07/13/2017    Subclinical hypothyroidism 06/17/2016    Tinnitus 07/13/2017    Vertigo 07/13/2017     Past Surgical History:   Procedure Laterality Date    ANKLE SURGERY Left 1995    Lt ankle d/t broken ankle injury    CATARACT EXTRACTION  2008    with lens implant    COLONOSCOPY      2013- 2016-  [id number]    ENDOSCOPY      2013- 2020    EYE SURGERY      eye implant- yes    HYSTERECTOMY      partial     JOINT REPLACEMENT      Joint surgery    KNEE SURGERY Right 2012    Knee replacement- Dr. Gupta-[facility]th[facility]    LAPAROSCOPIC CHOLECYSTECTOMY  2015    MASTECTOMY Right 1996    Dr. Trent Sun    OTHER SURGICAL HISTORY      Artificial joints/limbs     Social History     Socioeconomic History    Marital status:    Tobacco Use    Smoking status: Never     Passive exposure: Past    Smokeless tobacco: Never   Vaping Use    Vaping Use: Never used   Substance and Sexual Activity    Alcohol use: Never     Comment: does not drink 3/4/2019- 1/29/2019- 1/21/2019- 10/16/2018    Drug use: Never    Sexual activity: Defer     Family History   Problem Relation Age of Onset    Hypertension Mother     Arthritis Mother     Osteoporosis Mother     Arthritis Sister     Osteoporosis Sister     Heart disease Brother     Hypertension Brother     Diabetes Brother        Objective   Physical Exam  Vitals reviewed. Exam conducted with a chaperone present.   Constitutional:       General: She is not in acute distress.     Appearance: Normal appearance.   Cardiovascular:      Rate and Rhythm: Normal rate and regular rhythm.      Heart sounds: Normal heart sounds. No murmur heard.     No gallop.   Pulmonary:      Effort: Pulmonary effort is normal.      Breath sounds: Normal breath sounds.   Chest:   Breasts:     Right: Absent. Skin change (See diagram) present.      Left: Normal.       Abdominal:      General: Abdomen is flat. Bowel sounds are normal.      Palpations: Abdomen is soft.   Musculoskeletal:      Right lower leg: No edema.      Left lower leg: No edema.   Lymphadenopathy:      Upper Body:      Right upper body: No supraclavicular or axillary adenopathy.      Left upper body: No supraclavicular or axillary adenopathy.   Neurological:      Mental Status: She is alert and oriented to person, place, and time.   Psychiatric:          "Mood and Affect: Mood normal.         Behavior: Behavior normal.         Vitals:    02/22/24 1008   BP: 136/58   Pulse: 69   Resp: 18   Temp: 96.8 °F (36 °C)   SpO2: 100%   Weight: 63.3 kg (139 lb 8.8 oz)   Height: 157.5 cm (62.01\")   PainSc: 0-No pain     ECOG score: 0         PHQ-9 Total Score:                    Result Review :   The following data was reviewed by: Goran Yepez MD on 02/22/2024:  Lab Results   Component Value Date    HGB 14.9 02/28/2023    HCT 42.7 02/28/2023    MCV 87.9 02/28/2023     02/28/2023    WBC 9.69 02/28/2023    NEUTROABS 6.40 02/28/2023    LYMPHSABS 2.24 02/28/2023    MONOSABS 0.68 02/28/2023    EOSABS 0.26 02/28/2023    BASOSABS 0.06 02/28/2023     Lab Results   Component Value Date    GLUCOSE 98 02/28/2023    BUN 18 02/28/2023    CREATININE 0.92 02/28/2023     (L) 02/28/2023    K 4.0 02/28/2023    CL 99 02/28/2023    CO2 25.9 02/28/2023    CALCIUM 10.1 02/28/2023    PROTEINTOT 8.1 02/28/2023    ALBUMIN 4.6 02/28/2023    BILITOT 0.5 02/28/2023    ALKPHOS 89 02/28/2023    AST 24 02/28/2023    ALT 32 02/28/2023     Lab Results   Component Value Date    MG 1.84 01/26/2019    FREET4 1.1 10/06/2020    TSH 3.930 10/06/2020     No results found for: \"IRON\", \"LABIRON\", \"TRANSFERRIN\", \"TIBC\"  No results found for: \"LDH\", \"FERRITIN\", \"AOSJRUZR60\", \"FOLATE\"  No results found for: \"PSA\", \"CEA\", \"AFP\", \"\", \"\"    Data reviewed : Radiologic studies screening left mammogram and diagnostic left mammogram reviewed       Assessment and Plan    Diagnoses and all orders for this visit:    1. History of right breast cancer [Z85.3] (Primary)  Assessment & Plan:  Patient is now 28 years out from her diagnosis and mastectomy.  I see no evidence of disease recurrence by her history, physical examination or recent mammogram.  The left diagnostic mammogram most consistent with a small intramammary lymph node which had benign architecture.  Radiology recommended a 6-month repeat " mammogram to ensure stability which will be ordered.  I will see her back after      2. Right upper lobe pulmonary nodule  Assessment & Plan:  Identified on CT scan of the neck from last year.  Radiology recommended formal CT chest but she never had the study done.  I will order the CT of the chest.  Noncontrasted since she has an allergy to contrast.  Further recommendations based on results.    Orders:  -     CT Chest Without Contrast; Future    3. Cervicalgia  Assessment & Plan:  Patient complains of ongoing cervicalgia.  She has known degenerative changes in the neck but feels like they have gotten worse.  I will order MRI of the cervical spine.    Orders:  -     MRI neck soft tissue w contrast; Future    4. Dizziness  Assessment & Plan:  Patient reports symptoms of dizziness and vertigo.  These do improve with physical therapy but it does not last very long.  I will order MRI brain.  Further recommendations based on results.  Consider further physical therapy since it helped in the past    Orders:  -     MRI Brain With & Without Contrast; Future            Patient Follow Up: 6 months    Patient was given instructions and counseling regarding her condition or for health maintenance advice. Please see specific information pulled into the AVS if appropriate.     Goran Yepez MD    2/22/2024

## 2024-02-27 ENCOUNTER — HOSPITAL ENCOUNTER (OUTPATIENT)
Dept: CT IMAGING | Facility: HOSPITAL | Age: 79
Discharge: HOME | End: 2024-02-27
Admitting: INTERNAL MEDICINE
Payer: MEDICARE

## 2024-02-27 ENCOUNTER — TELEPHONE (OUTPATIENT)
Dept: UROLOGY | Facility: CLINIC | Age: 79
End: 2024-02-27
Payer: MEDICARE

## 2024-02-27 ENCOUNTER — CLINICAL SUPPORT (OUTPATIENT)
Dept: UROLOGY | Facility: CLINIC | Age: 79
End: 2024-02-27
Payer: MEDICARE

## 2024-02-27 DIAGNOSIS — R91.1 RIGHT UPPER LOBE PULMONARY NODULE: ICD-10-CM

## 2024-02-27 DIAGNOSIS — R10.13 EPIGASTRIC PAIN: ICD-10-CM

## 2024-02-27 DIAGNOSIS — R31.9 HEMATURIA, UNSPECIFIED TYPE: Primary | ICD-10-CM

## 2024-02-27 DIAGNOSIS — R31.0 GROSS HEMATURIA: Primary | ICD-10-CM

## 2024-02-27 DIAGNOSIS — R11.0 NAUSEA: ICD-10-CM

## 2024-02-27 LAB
BILIRUB BLD-MCNC: NEGATIVE MG/DL
CLARITY, POC: CLEAR
COLOR UR: ABNORMAL
EXPIRATION DATE: ABNORMAL
GLUCOSE UR STRIP-MCNC: NEGATIVE MG/DL
KETONES UR QL: NEGATIVE
LEUKOCYTE EST, POC: ABNORMAL
Lab: ABNORMAL
NITRITE UR-MCNC: NEGATIVE MG/ML
PH UR: 7 [PH] (ref 5–8)
PROT UR STRIP-MCNC: ABNORMAL MG/DL
RBC # UR STRIP: ABNORMAL /UL
SP GR UR: 1.02 (ref 1–1.03)
URINE VOLUME: 0
UROBILINOGEN UR QL: ABNORMAL

## 2024-02-27 PROCEDURE — 51798 US URINE CAPACITY MEASURE: CPT | Performed by: NURSE PRACTITIONER

## 2024-02-27 PROCEDURE — 74176 CT ABD & PELVIS W/O CONTRAST: CPT

## 2024-02-27 PROCEDURE — 51702 INSERT TEMP BLADDER CATH: CPT | Performed by: NURSE PRACTITIONER

## 2024-02-27 PROCEDURE — 71250 CT THORAX DX C-: CPT

## 2024-02-27 PROCEDURE — 81003 URINALYSIS AUTO W/O SCOPE: CPT | Performed by: NURSE PRACTITIONER

## 2024-02-27 RX ORDER — SULFAMETHOXAZOLE AND TRIMETHOPRIM 800; 160 MG/1; MG/1
1 TABLET ORAL 2 TIMES DAILY
Qty: 6 TABLET | Refills: 0 | Status: SHIPPED | OUTPATIENT
Start: 2024-02-27 | End: 2024-03-01

## 2024-02-27 NOTE — TELEPHONE ENCOUNTER
Patient called stating that she is having blood on tissue after wiping. Also feels like she is not emptying her bladder. I scheduled her a nurse visit today at 1030 for a bladder scan.

## 2024-02-29 ENCOUNTER — TELEPHONE (OUTPATIENT)
Dept: ONCOLOGY | Facility: HOSPITAL | Age: 79
End: 2024-02-29
Payer: MEDICARE

## 2024-02-29 ENCOUNTER — PREP FOR SURGERY (OUTPATIENT)
Dept: OTHER | Facility: HOSPITAL | Age: 79
End: 2024-02-29
Payer: MEDICARE

## 2024-02-29 DIAGNOSIS — R31.0 GROSS HEMATURIA: Primary | ICD-10-CM

## 2024-02-29 RX ORDER — SODIUM CHLORIDE 0.9 % (FLUSH) 0.9 %
10 SYRINGE (ML) INJECTION AS NEEDED
OUTPATIENT
Start: 2024-02-29

## 2024-02-29 RX ORDER — LEVOFLOXACIN 5 MG/ML
500 INJECTION, SOLUTION INTRAVENOUS ONCE
OUTPATIENT
Start: 2024-02-29 | End: 2024-02-29

## 2024-02-29 RX ORDER — SODIUM CHLORIDE 0.9 % (FLUSH) 0.9 %
10 SYRINGE (ML) INJECTION EVERY 12 HOURS SCHEDULED
OUTPATIENT
Start: 2024-02-29

## 2024-02-29 RX ORDER — SODIUM CHLORIDE 9 MG/ML
40 INJECTION, SOLUTION INTRAVENOUS AS NEEDED
OUTPATIENT
Start: 2024-02-29

## 2024-02-29 RX ORDER — SODIUM CHLORIDE 9 MG/ML
100 INJECTION, SOLUTION INTRAVENOUS CONTINUOUS
OUTPATIENT
Start: 2024-02-29

## 2024-02-29 NOTE — TELEPHONE ENCOUNTER
Caller: Alina Velasquez    Relationship: Self    Best call back number: [phone]    What is the best time to reach you: ANY.[email or web address] VM    Who are you requesting to speak with (clinical staff, provider,  specific staff member): SCHEDULING         What was the call regarding: PATIENT CALLED TO R/S APPT ON 8/14 TO AFTER 8/20.     Is it okay if the provider responds through MyChart: NO

## 2024-03-01 ENCOUNTER — TELEPHONE (OUTPATIENT)
Dept: UROLOGY | Facility: CLINIC | Age: 79
End: 2024-03-01

## 2024-03-01 NOTE — H&P
[facility]   Urology HISTORY AND PHYSICAL    Patient Name: Alina Velasquez  : 1945  MRN: [id number]  Primary Care Physician:  Claudia Patel PA-C  Date of admission: (Not on file)    Subjective   Subjective       History of Present Illness  Patient has gross hematuria and presents for cystoscopy and bilateral retrograde pyelograms.        Personal History     Past Medical History:   Diagnosis Date    Bloating 2016    Breast cancer 10/13/2017    Essential hypertension 2017    Generalized anxiety disorder 2019    Hematuria 10/30/2017    Herpes simplex 2018    History of right breast cancer 2022    Hyperlipemia 2016    Neoplasm of breast, female, malignant     right breast    Osteoarthritis, multiple sites 1994    throughout whole body    Polyarthritis of multiple sites 2017    Subclinical hypothyroidism 2016    Tinnitus 2017    Vertigo 2017       Past Surgical History:   Procedure Laterality Date    ANKLE SURGERY Left     Lt ankle d/t broken ankle injury    CATARACT EXTRACTION      with lens implant    COLONOSCOPY      2013- 2016- [id number]    ENDOSCOPY      2013- 2020    EYE SURGERY      eye implant- yes    HYSTERECTOMY      partial     JOINT REPLACEMENT      Joint surgery    KNEE SURGERY Right     Knee replacement- Dr. Gupta-Orth[facility]    LAPAROSCOPIC CHOLECYSTECTOMY  2015    MASTECTOMY Right     Dr. Trent Sun    OTHER SURGICAL HISTORY      Artificial joints/limbs       Family History: family history includes Arthritis in her mother and sister; Diabetes in her brother; Heart disease in her brother; Hypertension in her brother and mother; Osteoporosis in her mother and sister. Otherwise pertinent FHx was reviewed and not pertinent to current issue.    Social History:  reports that she has never smoked. She has been exposed to tobacco smoke. She has never used smokeless tobacco. She reports that she does not drink alcohol and does  not use drugs.    Home Medications:  Calcium Citrate-Vitamin D, Cyanocobalamin, Dentifrices, Omeprazole Magnesium, amLODIPine, atenolol, cetirizine, clonazePAM, fluticasone, guaiFENesin, pravastatin, sulfamethoxazole-trimethoprim, tiZANidine, and triamterene-hydrochlorothiazide    Allergies:  Allergies   Allergen Reactions    Contrast Dye (Echo Or Unknown Ct/Mr) Shortness Of Breath       Objective    Objective     Vitals:        Physical Exam  Constitutional:       Appearance: Normal appearance.   Cardiovascular:      Rate and Rhythm: Normal rate and regular rhythm.   Pulmonary:      Effort: Pulmonary effort is normal.      Breath sounds: Normal breath sounds.   Neurological:      Mental Status: She is alert. Mental status is at baseline.   Psychiatric:         Mood and Affect: Mood and affect normal.         Speech: Speech normal.         Judgment: Judgment normal.         Result Review    Result Review:  I have personally reviewed the results from the time of this admission to 2/29/2024 19:38 EST and agree with these findings:  [x]  Laboratory  []  Microbiology  [x]  Radiology  []  EKG/Telemetry   []  Cardiology/Vascular   []  Pathology  [x]  Old records  []  Other:      Assessment & Plan   Assessment / Plan       Active Hospital Problems:  There are no active hospital problems to display for this patient.      Plan: cystoscopy and bilateral retrograde pyelograms  Risks and benefits discussed with patient and they are agreeable to proceed.    DVT prophylaxis:  No DVT prophylaxis order currently exists.        CODE STATUS:           Electronically signed by Luciana Dukes MD, 02/29/24, 7:38 PM EST.

## 2024-03-01 NOTE — TELEPHONE ENCOUNTER
Spoke with patient and informed that Dr. Dukes says does not have to come in for appointment today as Dr. Dukes recommends a procedure in the OR for evaluation. We scheduled it for 3/14/24. I ent over preop instructions and informed that I would be mailing the information. Patient voiced understanding.

## 2024-03-01 NOTE — H&P (VIEW-ONLY)
[facility]   Urology HISTORY AND PHYSICAL    Patient Name: Alina Velasquez  : 1945  MRN: [id number]  Primary Care Physician:  Claudia Patel PA-C  Date of admission: (Not on file)    Subjective   Subjective       History of Present Illness  Patient has gross hematuria and presents for cystoscopy and bilateral retrograde pyelograms.        Personal History     Past Medical History:   Diagnosis Date    Bloating 2016    Breast cancer 10/13/2017    Essential hypertension 2017    Generalized anxiety disorder 2019    Hematuria 10/30/2017    Herpes simplex 2018    History of right breast cancer 2022    Hyperlipemia 2016    Neoplasm of breast, female, malignant     right breast    Osteoarthritis, multiple sites 1994    throughout whole body    Polyarthritis of multiple sites 2017    Subclinical hypothyroidism 2016    Tinnitus 2017    Vertigo 2017       Past Surgical History:   Procedure Laterality Date    ANKLE SURGERY Left     Lt ankle d/t broken ankle injury    CATARACT EXTRACTION      with lens implant    COLONOSCOPY      2013- 2016- [id number]    ENDOSCOPY      2013- 2020    EYE SURGERY      eye implant- yes    HYSTERECTOMY      partial     JOINT REPLACEMENT      Joint surgery    KNEE SURGERY Right     Knee replacement- Dr. Gupta-Orth[facility]    LAPAROSCOPIC CHOLECYSTECTOMY  2015    MASTECTOMY Right     Dr. Trent Sun    OTHER SURGICAL HISTORY      Artificial joints/limbs       Family History: family history includes Arthritis in her mother and sister; Diabetes in her brother; Heart disease in her brother; Hypertension in her brother and mother; Osteoporosis in her mother and sister. Otherwise pertinent FHx was reviewed and not pertinent to current issue.    Social History:  reports that she has never smoked. She has been exposed to tobacco smoke. She has never used smokeless tobacco. She reports that she does not drink alcohol and does  not use drugs.    Home Medications:  Calcium Citrate-Vitamin D, Cyanocobalamin, Dentifrices, Omeprazole Magnesium, amLODIPine, atenolol, cetirizine, clonazePAM, fluticasone, guaiFENesin, pravastatin, sulfamethoxazole-trimethoprim, tiZANidine, and triamterene-hydrochlorothiazide    Allergies:  Allergies   Allergen Reactions    Contrast Dye (Echo Or Unknown Ct/Mr) Shortness Of Breath       Objective    Objective     Vitals:        Physical Exam  Constitutional:       Appearance: Normal appearance.   Cardiovascular:      Rate and Rhythm: Normal rate and regular rhythm.   Pulmonary:      Effort: Pulmonary effort is normal.      Breath sounds: Normal breath sounds.   Neurological:      Mental Status: She is alert. Mental status is at baseline.   Psychiatric:         Mood and Affect: Mood and affect normal.         Speech: Speech normal.         Judgment: Judgment normal.         Result Review    Result Review:  I have personally reviewed the results from the time of this admission to 2/29/2024 19:38 EST and agree with these findings:  [x]  Laboratory  []  Microbiology  [x]  Radiology  []  EKG/Telemetry   []  Cardiology/Vascular   []  Pathology  [x]  Old records  []  Other:      Assessment & Plan   Assessment / Plan       Active Hospital Problems:  There are no active hospital problems to display for this patient.      Plan: cystoscopy and bilateral retrograde pyelograms  Risks and benefits discussed with patient and they are agreeable to proceed.    DVT prophylaxis:  No DVT prophylaxis order currently exists.        CODE STATUS:           Electronically signed by Luciana Dukes MD, 02/29/24, 7:38 PM EST.

## 2024-03-05 ENCOUNTER — TELEPHONE (OUTPATIENT)
Dept: ONCOLOGY | Facility: HOSPITAL | Age: 79
End: 2024-03-05
Payer: MEDICARE

## 2024-03-05 NOTE — TELEPHONE ENCOUNTER
Spoke with patient's  in regards to the patient's ct chest scan. Informed him that it only showed chronic scarring, the same as a year ago. Spouse v/u and states that he will let her know.

## 2024-03-05 NOTE — TELEPHONE ENCOUNTER
Caller: Alina Velasquez    Relationship: Self    Best call back number: [phone]    Caller requesting test results: SELF    What test was performed: CT SCAN CHEST    When was the test performed: 2-27    [facility] the [facility]

## 2024-03-12 NOTE — PRE-PROCEDURE INSTRUCTIONS
PRE-OP INSTRUCTIONS REVIEWED WITH PATIENT: FASTING/BATHING/ARRIVAL PROCEDURES.  INSTRUCTED TO TAKE A.M. DAY OF SURGERY: AMLODIPINE, PRILOSEC  UNDERSTANDING VERBALIZED.

## 2024-03-14 ENCOUNTER — ANESTHESIA EVENT (OUTPATIENT)
Dept: PERIOP | Facility: HOSPITAL | Age: 79
End: 2024-03-14
Payer: MEDICARE

## 2024-03-14 ENCOUNTER — ANESTHESIA (OUTPATIENT)
Dept: PERIOP | Facility: HOSPITAL | Age: 79
End: 2024-03-14
Payer: MEDICARE

## 2024-03-14 ENCOUNTER — HOSPITAL ENCOUNTER (OUTPATIENT)
Facility: HOSPITAL | Age: 79
Setting detail: HOSPITAL OUTPATIENT SURGERY
Discharge: HOME | End: 2024-03-14
Attending: UROLOGY | Admitting: UROLOGY
Payer: MEDICARE

## 2024-03-14 ENCOUNTER — APPOINTMENT (OUTPATIENT)
Dept: GENERAL RADIOLOGY | Facility: HOSPITAL | Age: 79
End: 2024-03-14
Payer: MEDICARE

## 2024-03-14 VITALS
BODY MASS INDEX: 25.44 KG/M2 | TEMPERATURE: 98.3 F | OXYGEN SATURATION: 99 % | RESPIRATION RATE: 17 BRPM | HEIGHT: 62 IN | WEIGHT: 138.23 LBS | SYSTOLIC BLOOD PRESSURE: 155 MMHG | DIASTOLIC BLOOD PRESSURE: 75 MMHG | HEART RATE: 63 BPM

## 2024-03-14 DIAGNOSIS — R31.0 GROSS HEMATURIA: ICD-10-CM

## 2024-03-14 PROCEDURE — 25010000002 PROPOFOL 10 MG/ML EMULSION: Performed by: NURSE ANESTHETIST, CERTIFIED REGISTERED

## 2024-03-14 PROCEDURE — C1758 CATHETER, URETERAL: HCPCS | Performed by: UROLOGY

## 2024-03-14 PROCEDURE — 25010000002 LEVOFLOXACIN PER 250 MG: Performed by: UROLOGY

## 2024-03-14 PROCEDURE — 76000 FLUOROSCOPY <1 HR PHYS/QHP: CPT

## 2024-03-14 PROCEDURE — 25810000003 LACTATED RINGERS PER 1000 ML: Performed by: ANESTHESIOLOGY

## 2024-03-14 PROCEDURE — 88305 TISSUE EXAM BY PATHOLOGIST: CPT | Performed by: UROLOGY

## 2024-03-14 PROCEDURE — 25010000002 MIDAZOLAM PER 1MG: Performed by: ANESTHESIOLOGY

## 2024-03-14 PROCEDURE — 52204 CYSTOSCOPY W/BIOPSY(S): CPT | Performed by: UROLOGY

## 2024-03-14 PROCEDURE — 52005 CYSTO W/URTRL CATHJ: CPT | Performed by: UROLOGY

## 2024-03-14 RX ORDER — PROPOFOL 10 MG/ML
VIAL (ML) INTRAVENOUS AS NEEDED
Status: DISCONTINUED | OUTPATIENT
Start: 2024-03-14 | End: 2024-03-14 | Stop reason: SURG

## 2024-03-14 RX ORDER — SODIUM CHLORIDE 9 MG/ML
100 INJECTION, SOLUTION INTRAVENOUS CONTINUOUS
Status: DISCONTINUED | OUTPATIENT
Start: 2024-03-14 | End: 2024-03-14 | Stop reason: HOSPADM

## 2024-03-14 RX ORDER — MIDAZOLAM HYDROCHLORIDE 2 MG/2ML
1 INJECTION, SOLUTION INTRAMUSCULAR; INTRAVENOUS ONCE
Status: COMPLETED | OUTPATIENT
Start: 2024-03-14 | End: 2024-03-14

## 2024-03-14 RX ORDER — SODIUM CHLORIDE 0.9 % (FLUSH) 0.9 %
10 SYRINGE (ML) INJECTION EVERY 12 HOURS SCHEDULED
Status: DISCONTINUED | OUTPATIENT
Start: 2024-03-14 | End: 2024-03-14 | Stop reason: HOSPADM

## 2024-03-14 RX ORDER — PROMETHAZINE HYDROCHLORIDE 12.5 MG/1
25 TABLET ORAL ONCE AS NEEDED
Status: DISCONTINUED | OUTPATIENT
Start: 2024-03-14 | End: 2024-03-14 | Stop reason: HOSPADM

## 2024-03-14 RX ORDER — LIDOCAINE HYDROCHLORIDE 20 MG/ML
INJECTION, SOLUTION EPIDURAL; INFILTRATION; INTRACAUDAL; PERINEURAL AS NEEDED
Status: DISCONTINUED | OUTPATIENT
Start: 2024-03-14 | End: 2024-03-14 | Stop reason: SURG

## 2024-03-14 RX ORDER — ACETAMINOPHEN 325 MG/1
650 TABLET ORAL ONCE
Qty: 2 TABLET | Refills: 0 | Status: DISCONTINUED | OUTPATIENT
Start: 2024-03-14 | End: 2024-03-14 | Stop reason: HOSPADM

## 2024-03-14 RX ORDER — SODIUM CHLORIDE 0.9 % (FLUSH) 0.9 %
10 SYRINGE (ML) INJECTION AS NEEDED
Status: DISCONTINUED | OUTPATIENT
Start: 2024-03-14 | End: 2024-03-14 | Stop reason: HOSPADM

## 2024-03-14 RX ORDER — IBUPROFEN 600 MG/1
600 TABLET ORAL EVERY 6 HOURS PRN
Status: DISCONTINUED | OUTPATIENT
Start: 2024-03-14 | End: 2024-03-14 | Stop reason: HOSPADM

## 2024-03-14 RX ORDER — MAGNESIUM HYDROXIDE 1200 MG/15ML
LIQUID ORAL AS NEEDED
Status: DISCONTINUED | OUTPATIENT
Start: 2024-03-14 | End: 2024-03-14 | Stop reason: HOSPADM

## 2024-03-14 RX ORDER — LEVOFLOXACIN 5 MG/ML
500 INJECTION, SOLUTION INTRAVENOUS ONCE
Status: COMPLETED | OUTPATIENT
Start: 2024-03-14 | End: 2024-03-14

## 2024-03-14 RX ORDER — OXYCODONE HYDROCHLORIDE 5 MG/1
5 TABLET ORAL
Status: DISCONTINUED | OUTPATIENT
Start: 2024-03-14 | End: 2024-03-14 | Stop reason: HOSPADM

## 2024-03-14 RX ORDER — PROMETHAZINE HYDROCHLORIDE 25 MG/1
25 SUPPOSITORY RECTAL ONCE AS NEEDED
Status: DISCONTINUED | OUTPATIENT
Start: 2024-03-14 | End: 2024-03-14 | Stop reason: HOSPADM

## 2024-03-14 RX ORDER — SODIUM CHLORIDE, SODIUM LACTATE, POTASSIUM CHLORIDE, CALCIUM CHLORIDE 600; 310; 30; 20 MG/100ML; MG/100ML; MG/100ML; MG/100ML
9 INJECTION, SOLUTION INTRAVENOUS CONTINUOUS PRN
Status: DISCONTINUED | OUTPATIENT
Start: 2024-03-14 | End: 2024-03-14 | Stop reason: HOSPADM

## 2024-03-14 RX ORDER — SODIUM CHLORIDE 9 MG/ML
40 INJECTION, SOLUTION INTRAVENOUS AS NEEDED
Status: DISCONTINUED | OUTPATIENT
Start: 2024-03-14 | End: 2024-03-14 | Stop reason: HOSPADM

## 2024-03-14 RX ORDER — ONDANSETRON 2 MG/ML
4 INJECTION INTRAMUSCULAR; INTRAVENOUS ONCE AS NEEDED
Status: DISCONTINUED | OUTPATIENT
Start: 2024-03-14 | End: 2024-03-14 | Stop reason: HOSPADM

## 2024-03-14 RX ORDER — PROMETHAZINE HYDROCHLORIDE 12.5 MG/1
12.5 TABLET ORAL ONCE AS NEEDED
Status: DISCONTINUED | OUTPATIENT
Start: 2024-03-14 | End: 2024-03-14 | Stop reason: HOSPADM

## 2024-03-14 RX ORDER — MEPERIDINE HYDROCHLORIDE 25 MG/ML
12.5 INJECTION INTRAMUSCULAR; INTRAVENOUS; SUBCUTANEOUS
Status: DISCONTINUED | OUTPATIENT
Start: 2024-03-14 | End: 2024-03-14 | Stop reason: HOSPADM

## 2024-03-14 RX ORDER — ACETAMINOPHEN 500 MG
1000 TABLET ORAL ONCE
Status: COMPLETED | OUTPATIENT
Start: 2024-03-14 | End: 2024-03-14

## 2024-03-14 RX ORDER — KETAMINE HCL IN NACL, ISO-OSM 100MG/10ML
SYRINGE (ML) INJECTION AS NEEDED
Status: DISCONTINUED | OUTPATIENT
Start: 2024-03-14 | End: 2024-03-14 | Stop reason: SURG

## 2024-03-14 RX ADMIN — PROPOFOL 25 MCG/KG/MIN: 10 INJECTION, EMULSION INTRAVENOUS at 12:09

## 2024-03-14 RX ADMIN — LEVOFLOXACIN 500 MG: 5 INJECTION, SOLUTION INTRAVENOUS at 12:09

## 2024-03-14 RX ADMIN — LIDOCAINE HYDROCHLORIDE 60 MG: 20 INJECTION, SOLUTION INTRAVENOUS at 12:09

## 2024-03-14 RX ADMIN — Medication 50 MG: at 12:09

## 2024-03-14 RX ADMIN — MIDAZOLAM HYDROCHLORIDE 1 MG: 1 INJECTION, SOLUTION INTRAMUSCULAR; INTRAVENOUS at 11:58

## 2024-03-14 RX ADMIN — SODIUM CHLORIDE, POTASSIUM CHLORIDE, SODIUM LACTATE AND CALCIUM CHLORIDE 9 ML/HR: 600; 310; 30; 20 INJECTION, SOLUTION INTRAVENOUS at 11:40

## 2024-03-14 RX ADMIN — ACETAMINOPHEN 1000 MG: 500 TABLET ORAL at 11:40

## 2024-03-14 RX ADMIN — PROPOFOL 40 MG: 10 INJECTION, EMULSION INTRAVENOUS at 12:09

## 2024-03-14 NOTE — ANESTHESIA POSTPROCEDURE EVALUATION
Patient: Alina Velasquez    Procedure Summary       Date: 03/14/24 Room / Location: [facility] OR 07 / [facility] MAIN OR    Anesthesia Start: 1203 Anesthesia Stop: 1234    Procedure: CYSTOSCOPY RETROGRADE PYELOGRAM, CYSTOSCOPY BLADDER BIOPSY AND FULGURATION (Bilateral) Diagnosis:       Gross hematuria      (Gross hematuria [R31.0])    Surgeons: Luciana Dukes MD Provider: Everardo Morales MD    Anesthesia Type: general, MAC ASA Status: 2            Anesthesia Type: general, MAC    Vitals  Vitals Value Taken Time   /58 03/14/24 1318   Temp 36.6 °C (97.9 °F) 03/14/24 1305   Pulse 67 03/14/24 1319   Resp 12 03/14/24 1315   SpO2 95 % 03/14/24 1319   Vitals shown include unfiled device data.        Post Anesthesia Care and Evaluation    Patient location during evaluation: bedside  Patient participation: complete - patient participated  Level of consciousness: awake  Pain management: adequate    Airway patency: patent  PONV Status: none  Cardiovascular status: acceptable  Respiratory status: acceptable  Hydration status: acceptable    Comments: An Anesthesiologist personally participated in the most demanding procedures (including induction and emergence if applicable) in the anesthesia plan, monitored the course of anesthesia administration at frequent intervals and remained physically present and available for immediate diagnosis and treatment of emergencies.

## 2024-03-14 NOTE — DISCHARGE INSTRUCTIONS
DISCHARGE INSTRUCTIONS CYSTOSCOPY      For your surgery you had:  General anesthesia (you may have a sore throat for the first 24 hours)    You may experience dizziness, drowsiness, or lightheadedness for several hours following surgery.  Do not stay alone today or tonight.  Limit your activity for 24 hours.  You should not drive, operate machinery, drink alcohol, or sign legally binding documents for 24 hours or while you are taking pain medication.  Resume your diet slowly.  Follow any special dietary instructions you may have been given by your doctor.    NOTIFY YOUR DOCTOR IF YOU EXPERIENCE ANY OF THE FOLLOWING:  Temperature greater than 101 degrees Fahrenheit  Shaking Chills  Redness or excessive drainage from incision  Nausea, vomiting and/or pain that is not controlled by prescribed medications  Increase in bleeding or bleeding that is excessive  Unable to urinate in 6 hours after surgery  If unable to reach your doctor, please go to the closest Emergency Room   Following your cystoscopy exam, you may experience burning upon urination.  You may also pass some bloody urine.  If the burning sensation and/or bloody urine should persist beyond 48 hours, call your doctor.  To encourage kidney and bladder function, you should drink as much fluid as possible.  If you have difficulty urinating, try sitting in a bath tub of warm water.  If you become uncomfortable because you cannot urinate, call your doctor or come to the Emergency Room at the hospital.  Medications per physician instructions as indicated on Discharge Medication Information Sheet.    Last dose of pain medication given at:  Tylenol 1000mg given at 1140AM

## 2024-03-14 NOTE — OP NOTE
CYSTOSCOPY RETROGRADE PYELOGRAM  Procedure Report    Patient Name:  Alina Velasquez  YOB: 1945    Date of Surgery:  3/14/2024     Pre-op Diagnosis:   Gross hematuria [R31.0]       Post-Op Diagnosis Codes:     * Gross hematuria [R31.0]      Procedure/CPT® Codes:    Procedure(s):  CYSTOSCOPY BILATERAL RETROGRADE PYELOGRAM  CYSTOSCOPY BLADDER BIOPSY AND FULGURATION      Staff:  Surgeon(s):  Luciana Dukes MD         Anesthesia: Monitored Anesthesia Care    Estimated Blood Loss: 1 mL    Implants:    Nothing was implanted during the procedure    Specimen:          Specimens       ID Source Type Tests Collected By Collected At Frozen?    A Urinary Bladder Tissue TISSUE PATHOLOGY EXAM   Luciana Dukes MD 3/14/24 1228     Description: bladder biopsy X2                Complications: None    Description of Procedure:     After proper consent was obtained, patient was taken to operating room and MAC anesthesia was performed.  The patient was placed in the dorsal lithotomy position and prepped and draped in the normal sterile fashion for cystoscopy.       A 22 Romanian rigid cystoscope was inserted into the bladder.  The bladder was inspected in a systemic meridian fashion using a 30 degree lens.  There were no tumors, lesions, stones or other abnormalities seen except for some erythematous changes seen on the dome.  Both ureteral orifices were normal in appearance.       Bilateral retrograde pyelograms were then performed, first on the patient's left side and then on the right.  There were no filling defects, tumors, stones or other abnormalities seen.       Biopsies were taken of the suspicious areas using biopsy forceps.  The areas were then fulgerated with a bugbee.  There areas were less than 0.5cm in size.       The bladder was emptied and the cystoscope removed.       The patient tolerated the procedure well and was transferred to the PACU in stable condition.            Luciana Dukes MD      Date: 3/14/2024  Time: 16:58 EDT

## 2024-03-14 NOTE — ANESTHESIA PREPROCEDURE EVALUATION
Anesthesia Evaluation     Patient summary reviewed and Nursing notes reviewed   no history of anesthetic complications:   NPO Solid Status: > 8 hours  NPO Liquid Status: > 2 hours           Airway   Mallampati: II  TM distance: >3 FB  Neck ROM: limited  Possible difficult intubation  Dental      Pulmonary - negative pulmonary ROS and normal exam    breath sounds clear to auscultation  Cardiovascular - normal exam  Exercise tolerance: good (4-7 METS)    Rhythm: regular  Rate: normal    (+) hypertension, hyperlipidemia      Neuro/Psych- negative ROS  GI/Hepatic/Renal/Endo    (+) thyroid problem     Musculoskeletal     (+) neck pain  Abdominal    Substance History      OB/GYN          Other   arthritis,   history of cancer (breast) remission    ROS/Med Hx Other: PAT Nursing Notes unavailable.               Anesthesia Plan    ASA 2     general and MAC   total IV anesthesia  (Patient understands anesthesia not responsible for dental damage.)  intravenous induction     Anesthetic plan, risks, benefits, and alternatives have been provided, discussed and informed consent has been obtained with: patient and spouse/significant other.    Plan discussed with CRNA.    CODE STATUS:

## 2024-03-18 ENCOUNTER — TELEPHONE (OUTPATIENT)
Dept: UROLOGY | Facility: CLINIC | Age: 79
End: 2024-03-18
Payer: MEDICARE

## 2024-03-18 LAB
CYTO UR: NORMAL
LAB AP CASE REPORT: NORMAL
LAB AP CLINICAL INFORMATION: NORMAL
PATH REPORT.FINAL DX SPEC: NORMAL
PATH REPORT.GROSS SPEC: NORMAL

## 2024-03-18 NOTE — TELEPHONE ENCOUNTER
Spoke to patient and informed per Dr. Dukes that the pathology came back from procedure and there was nothing to be concerned with that it showed chronic cystitis. Patient agreed to cancelling appointment on Wednesday and I informed that I would put in a 1 year recall. Patient voiced understanding.

## 2024-03-27 DIAGNOSIS — M54.2 CERVICALGIA: Primary | ICD-10-CM

## 2024-03-29 ENCOUNTER — HOSPITAL ENCOUNTER (OUTPATIENT)
Dept: MRI IMAGING | Facility: HOSPITAL | Age: 79
Discharge: HOME | End: 2024-03-29
Payer: MEDICARE

## 2024-03-29 ENCOUNTER — HOSPITAL ENCOUNTER (OUTPATIENT)
Dept: MRI IMAGING | Facility: HOSPITAL | Age: 79
End: 2024-03-29
Payer: MEDICARE

## 2024-03-29 DIAGNOSIS — R42 DIZZINESS: ICD-10-CM

## 2024-03-29 DIAGNOSIS — M54.2 CERVICALGIA: ICD-10-CM

## 2024-03-29 LAB
CREAT BLDA-MCNC: 0.9 MG/DL (ref 0.6–1.3)
EGFRCR SERPLBLD CKD-EPI 2021: 65.2 ML/MIN/1.73

## 2024-03-29 PROCEDURE — 82565 ASSAY OF CREATININE: CPT

## 2024-03-29 PROCEDURE — 72142 MRI NECK SPINE W/DYE: CPT

## 2024-03-29 PROCEDURE — A9577 INJ MULTIHANCE: HCPCS | Performed by: INTERNAL MEDICINE

## 2024-03-29 PROCEDURE — 0 GADOBENATE DIMEGLUMINE 529 MG/ML SOLUTION: Performed by: INTERNAL MEDICINE

## 2024-03-29 PROCEDURE — 70553 MRI BRAIN STEM W/O & W/DYE: CPT

## 2024-03-29 RX ADMIN — GADOBENATE DIMEGLUMINE 13 ML: 529 INJECTION, SOLUTION INTRAVENOUS at 13:21

## 2024-04-01 ENCOUNTER — TELEPHONE (OUTPATIENT)
Dept: ONCOLOGY | Facility: HOSPITAL | Age: 79
End: 2024-04-01
Payer: MEDICARE

## 2024-04-01 NOTE — TELEPHONE ENCOUNTER
Spoke with patient to give her results of the mri's that she had done. Reiterated what I had previously told the spouse. Patient v/u.

## 2024-04-01 NOTE — TELEPHONE ENCOUNTER
----- Message from Goran Yepez MD sent at 4/1/2024  8:00 AM EDT -----  MRI of neck and brain shows arthritis and disc disease on neck and small vessel disease on brain. Nothing to suggest cancer which is good. She should follow up with pcp if still having symptoms.  ----- Message -----  From: Interface, Rad Results [facility]  Sent: 3/29/2024   2:42 PM EDT  To: Goran Yepez MD

## 2024-04-01 NOTE — TELEPHONE ENCOUNTER
Spoke with spouse to let them know that the mri was negative for any cancer. It showed arthritis and disc disease in the neck  and small vessel disease in the brain. Instructed to follow up with pcp if symptoms continued. Spouse v/u.

## 2024-04-12 ENCOUNTER — APPOINTMENT (OUTPATIENT)
Dept: CT IMAGING | Facility: HOSPITAL | Age: 79
End: 2024-04-12
Payer: MEDICARE

## 2024-04-12 ENCOUNTER — APPOINTMENT (OUTPATIENT)
Dept: GENERAL RADIOLOGY | Facility: HOSPITAL | Age: 79
End: 2024-04-12
Payer: MEDICARE

## 2024-04-12 ENCOUNTER — HOSPITAL ENCOUNTER (OUTPATIENT)
Facility: HOSPITAL | Age: 79
Setting detail: OBSERVATION
Discharge: HOME | End: 2024-04-13
Attending: EMERGENCY MEDICINE | Admitting: INTERNAL MEDICINE
Payer: MEDICARE

## 2024-04-12 DIAGNOSIS — E86.0 DEHYDRATION: Primary | ICD-10-CM

## 2024-04-12 LAB
ALBUMIN SERPL-MCNC: 3.9 G/DL (ref 3.5–5.2)
ALBUMIN SERPL-MCNC: 4.3 G/DL (ref 3.5–5.2)
ALBUMIN/GLOB SERPL: 1.3 G/DL
ALBUMIN/GLOB SERPL: 1.4 G/DL
ALP SERPL-CCNC: 66 U/L (ref 39–117)
ALP SERPL-CCNC: 77 U/L (ref 39–117)
ALT SERPL W P-5'-P-CCNC: 29 U/L (ref 1–33)
ALT SERPL W P-5'-P-CCNC: 32 U/L (ref 1–33)
ANION GAP SERPL CALCULATED.3IONS-SCNC: 11.3 MMOL/L (ref 5–15)
ANION GAP SERPL CALCULATED.3IONS-SCNC: 15.5 MMOL/L (ref 5–15)
AST SERPL-CCNC: 24 U/L (ref 1–32)
AST SERPL-CCNC: 27 U/L (ref 1–32)
BACTERIA UR QL AUTO: NORMAL /HPF
BASOPHILS # BLD AUTO: 0.05 10*3/MM3 (ref 0–0.2)
BASOPHILS NFR BLD AUTO: 0.6 % (ref 0–1.5)
BILIRUB SERPL-MCNC: 0.3 MG/DL (ref 0–1.2)
BILIRUB SERPL-MCNC: 0.5 MG/DL (ref 0–1.2)
BILIRUB UR QL STRIP: NEGATIVE
BUN SERPL-MCNC: 11 MG/DL (ref 8–23)
BUN SERPL-MCNC: 16 MG/DL (ref 8–23)
BUN/CREAT SERPL: 15.5 (ref 7–25)
BUN/CREAT SERPL: 19 (ref 7–25)
CALCIUM SPEC-SCNC: 8.7 MG/DL (ref 8.6–10.5)
CALCIUM SPEC-SCNC: 9.9 MG/DL (ref 8.6–10.5)
CHLORIDE SERPL-SCNC: 104 MMOL/L (ref 98–107)
CHLORIDE SERPL-SCNC: 109 MMOL/L (ref 98–107)
CLARITY UR: CLEAR
CO2 SERPL-SCNC: 20.5 MMOL/L (ref 22–29)
CO2 SERPL-SCNC: 22.7 MMOL/L (ref 22–29)
COLOR UR: YELLOW
CORTIS SERPL-MCNC: 20.05 MCG/DL
CREAT SERPL-MCNC: 0.71 MG/DL (ref 0.57–1)
CREAT SERPL-MCNC: 0.84 MG/DL (ref 0.57–1)
DEPRECATED RDW RBC AUTO: 40.4 FL (ref 37–54)
DEPRECATED RDW RBC AUTO: 42.4 FL (ref 37–54)
EGFRCR SERPLBLD CKD-EPI 2021: 70.8 ML/MIN/1.73
EGFRCR SERPLBLD CKD-EPI 2021: 86.6 ML/MIN/1.73
EOSINOPHIL # BLD AUTO: 0.24 10*3/MM3 (ref 0–0.4)
EOSINOPHIL NFR BLD AUTO: 2.9 % (ref 0.3–6.2)
ERYTHROCYTE [DISTWIDTH] IN BLOOD BY AUTOMATED COUNT: 12.9 % (ref 12.3–15.4)
ERYTHROCYTE [DISTWIDTH] IN BLOOD BY AUTOMATED COUNT: 12.9 % (ref 12.3–15.4)
GLOBULIN UR ELPH-MCNC: 2.7 GM/DL
GLOBULIN UR ELPH-MCNC: 3.2 GM/DL
GLUCOSE BLDC GLUCOMTR-MCNC: 138 MG/DL (ref 70–99)
GLUCOSE SERPL-MCNC: 170 MG/DL (ref 65–99)
GLUCOSE SERPL-MCNC: 98 MG/DL (ref 65–99)
GLUCOSE UR STRIP-MCNC: NEGATIVE MG/DL
HCT VFR BLD AUTO: 38.8 % (ref 34–46.6)
HCT VFR BLD AUTO: 42 % (ref 34–46.6)
HGB BLD-MCNC: 13 G/DL (ref 12–15.9)
HGB BLD-MCNC: 14.5 G/DL (ref 12–15.9)
HGB UR QL STRIP.AUTO: ABNORMAL
HOLD SPECIMEN: NORMAL
HOLD SPECIMEN: NORMAL
HYALINE CASTS UR QL AUTO: NORMAL /LPF
IMM GRANULOCYTES # BLD AUTO: 0.03 10*3/MM3 (ref 0–0.05)
IMM GRANULOCYTES NFR BLD AUTO: 0.4 % (ref 0–0.5)
KETONES UR QL STRIP: ABNORMAL
LEUKOCYTE ESTERASE UR QL STRIP.AUTO: NEGATIVE
LIPASE SERPL-CCNC: 19 U/L (ref 13–60)
LYMPHOCYTES # BLD AUTO: 2.29 10*3/MM3 (ref 0.7–3.1)
LYMPHOCYTES NFR BLD AUTO: 27.4 % (ref 19.6–45.3)
MAGNESIUM SERPL-MCNC: 1.9 MG/DL (ref 1.6–2.4)
MCH RBC QN AUTO: 29.8 PG (ref 26.6–33)
MCH RBC QN AUTO: 29.9 PG (ref 26.6–33)
MCHC RBC AUTO-ENTMCNC: 33.5 G/DL (ref 31.5–35.7)
MCHC RBC AUTO-ENTMCNC: 34.5 G/DL (ref 31.5–35.7)
MCV RBC AUTO: 86.6 FL (ref 79–97)
MCV RBC AUTO: 89 FL (ref 79–97)
MONOCYTES # BLD AUTO: 0.52 10*3/MM3 (ref 0.1–0.9)
MONOCYTES NFR BLD AUTO: 6.2 % (ref 5–12)
NEUTROPHILS NFR BLD AUTO: 5.24 10*3/MM3 (ref 1.7–7)
NEUTROPHILS NFR BLD AUTO: 62.5 % (ref 42.7–76)
NITRITE UR QL STRIP: NEGATIVE
NRBC BLD AUTO-RTO: 0 /100 WBC (ref 0–0.2)
NT-PROBNP SERPL-MCNC: 261.7 PG/ML (ref 0–1800)
PH UR STRIP.AUTO: 8 [PH] (ref 5–8)
PLATELET # BLD AUTO: 266 10*3/MM3 (ref 140–450)
PLATELET # BLD AUTO: 309 10*3/MM3 (ref 140–450)
PMV BLD AUTO: 9.8 FL (ref 6–12)
PMV BLD AUTO: 9.9 FL (ref 6–12)
POTASSIUM SERPL-SCNC: 3.7 MMOL/L (ref 3.5–5.2)
POTASSIUM SERPL-SCNC: 3.8 MMOL/L (ref 3.5–5.2)
PROT SERPL-MCNC: 6.6 G/DL (ref 6–8.5)
PROT SERPL-MCNC: 7.5 G/DL (ref 6–8.5)
PROT UR QL STRIP: NEGATIVE
QT INTERVAL: 426 MS
QTC INTERVAL: 462 MS
RBC # BLD AUTO: 4.36 10*6/MM3 (ref 3.77–5.28)
RBC # BLD AUTO: 4.85 10*6/MM3 (ref 3.77–5.28)
RBC # UR STRIP: NORMAL /HPF
REF LAB TEST METHOD: NORMAL
SODIUM SERPL-SCNC: 140 MMOL/L (ref 136–145)
SODIUM SERPL-SCNC: 143 MMOL/L (ref 136–145)
SP GR UR STRIP: 1.01 (ref 1–1.03)
SQUAMOUS #/AREA URNS HPF: NORMAL /HPF
TROPONIN T SERPL HS-MCNC: 8 NG/L
TSH SERPL DL<=0.05 MIU/L-ACNC: 2.06 UIU/ML (ref 0.27–4.2)
UROBILINOGEN UR QL STRIP: ABNORMAL
WBC # UR STRIP: NORMAL /HPF
WBC NRBC COR # BLD AUTO: 8.37 10*3/MM3 (ref 3.4–10.8)
WBC NRBC COR # BLD AUTO: 9.49 10*3/MM3 (ref 3.4–10.8)
WHOLE BLOOD HOLD COAG: NORMAL
WHOLE BLOOD HOLD SPECIMEN: NORMAL

## 2024-04-12 PROCEDURE — 83735 ASSAY OF MAGNESIUM: CPT | Performed by: EMERGENCY MEDICINE

## 2024-04-12 PROCEDURE — 82533 TOTAL CORTISOL: CPT | Performed by: INTERNAL MEDICINE

## 2024-04-12 PROCEDURE — G0378 HOSPITAL OBSERVATION PER HR: HCPCS

## 2024-04-12 PROCEDURE — 83690 ASSAY OF LIPASE: CPT | Performed by: EMERGENCY MEDICINE

## 2024-04-12 PROCEDURE — 84443 ASSAY THYROID STIM HORMONE: CPT | Performed by: EMERGENCY MEDICINE

## 2024-04-12 PROCEDURE — 82948 REAGENT STRIP/BLOOD GLUCOSE: CPT

## 2024-04-12 PROCEDURE — 25010000002 ONDANSETRON PER 1 MG: Performed by: INTERNAL MEDICINE

## 2024-04-12 PROCEDURE — 99285 EMERGENCY DEPT VISIT HI MDM: CPT

## 2024-04-12 PROCEDURE — 81001 URINALYSIS AUTO W/SCOPE: CPT | Performed by: EMERGENCY MEDICINE

## 2024-04-12 PROCEDURE — 80053 COMPREHEN METABOLIC PANEL: CPT | Performed by: EMERGENCY MEDICINE

## 2024-04-12 PROCEDURE — 96375 TX/PRO/DX INJ NEW DRUG ADDON: CPT

## 2024-04-12 PROCEDURE — 93010 ELECTROCARDIOGRAM REPORT: CPT | Performed by: INTERNAL MEDICINE

## 2024-04-12 PROCEDURE — 25810000003 SODIUM CHLORIDE 0.9 % SOLUTION: Performed by: INTERNAL MEDICINE

## 2024-04-12 PROCEDURE — 70450 CT HEAD/BRAIN W/O DYE: CPT

## 2024-04-12 PROCEDURE — 96365 THER/PROPH/DIAG IV INF INIT: CPT

## 2024-04-12 PROCEDURE — 25010000002 ONDANSETRON PER 1 MG: Performed by: EMERGENCY MEDICINE

## 2024-04-12 PROCEDURE — 71045 X-RAY EXAM CHEST 1 VIEW: CPT

## 2024-04-12 PROCEDURE — 85027 COMPLETE CBC AUTOMATED: CPT | Performed by: INTERNAL MEDICINE

## 2024-04-12 PROCEDURE — 36415 COLL VENOUS BLD VENIPUNCTURE: CPT

## 2024-04-12 PROCEDURE — 25810000003 SODIUM CHLORIDE 0.9 % SOLUTION: Performed by: EMERGENCY MEDICINE

## 2024-04-12 PROCEDURE — 83880 ASSAY OF NATRIURETIC PEPTIDE: CPT | Performed by: EMERGENCY MEDICINE

## 2024-04-12 PROCEDURE — 25010000002 CEFTRIAXONE PER 250 MG: Performed by: INTERNAL MEDICINE

## 2024-04-12 PROCEDURE — 93005 ELECTROCARDIOGRAM TRACING: CPT | Performed by: EMERGENCY MEDICINE

## 2024-04-12 PROCEDURE — 85025 COMPLETE CBC W/AUTO DIFF WBC: CPT | Performed by: EMERGENCY MEDICINE

## 2024-04-12 PROCEDURE — 96376 TX/PRO/DX INJ SAME DRUG ADON: CPT

## 2024-04-12 PROCEDURE — 80053 COMPREHEN METABOLIC PANEL: CPT | Performed by: INTERNAL MEDICINE

## 2024-04-12 PROCEDURE — 84484 ASSAY OF TROPONIN QUANT: CPT | Performed by: EMERGENCY MEDICINE

## 2024-04-12 RX ORDER — CLONAZEPAM 0.5 MG/1
0.25 TABLET ORAL NIGHTLY PRN
Status: DISCONTINUED | OUTPATIENT
Start: 2024-04-12 | End: 2024-04-13 | Stop reason: HOSPADM

## 2024-04-12 RX ORDER — POLYETHYLENE GLYCOL 3350 17 G/17G
17 POWDER, FOR SOLUTION ORAL DAILY PRN
Status: DISCONTINUED | OUTPATIENT
Start: 2024-04-12 | End: 2024-04-13 | Stop reason: HOSPADM

## 2024-04-12 RX ORDER — SODIUM CHLORIDE 0.9 % (FLUSH) 0.9 %
10 SYRINGE (ML) INJECTION EVERY 12 HOURS SCHEDULED
Status: DISCONTINUED | OUTPATIENT
Start: 2024-04-12 | End: 2024-04-13 | Stop reason: HOSPADM

## 2024-04-12 RX ORDER — BISACODYL 10 MG
10 SUPPOSITORY, RECTAL RECTAL DAILY PRN
Status: DISCONTINUED | OUTPATIENT
Start: 2024-04-12 | End: 2024-04-13 | Stop reason: HOSPADM

## 2024-04-12 RX ORDER — ONDANSETRON 2 MG/ML
4 INJECTION INTRAMUSCULAR; INTRAVENOUS ONCE
Status: COMPLETED | OUTPATIENT
Start: 2024-04-12 | End: 2024-04-12

## 2024-04-12 RX ORDER — DEXTROSE MONOHYDRATE, SODIUM CHLORIDE, AND POTASSIUM CHLORIDE 50; 1.49; 4.5 G/1000ML; G/1000ML; G/1000ML
100 INJECTION, SOLUTION INTRAVENOUS CONTINUOUS
Status: DISCONTINUED | OUTPATIENT
Start: 2024-04-12 | End: 2024-04-13 | Stop reason: HOSPADM

## 2024-04-12 RX ORDER — SODIUM CHLORIDE 0.9 % (FLUSH) 0.9 %
10 SYRINGE (ML) INJECTION AS NEEDED
Status: DISCONTINUED | OUTPATIENT
Start: 2024-04-12 | End: 2024-04-12

## 2024-04-12 RX ORDER — PRAVASTATIN SODIUM 20 MG
20 TABLET ORAL NIGHTLY
Status: DISCONTINUED | OUTPATIENT
Start: 2024-04-12 | End: 2024-04-13 | Stop reason: HOSPADM

## 2024-04-12 RX ORDER — OMEPRAZOLE 20 MG/1
20 CAPSULE, DELAYED RELEASE ORAL EVERY OTHER DAY
COMMUNITY

## 2024-04-12 RX ORDER — SODIUM CHLORIDE 0.9 % (FLUSH) 0.9 %
10 SYRINGE (ML) INJECTION AS NEEDED
Status: DISCONTINUED | OUTPATIENT
Start: 2024-04-12 | End: 2024-04-13 | Stop reason: HOSPADM

## 2024-04-12 RX ORDER — AMLODIPINE BESYLATE 5 MG/1
5 TABLET ORAL
Status: DISCONTINUED | OUTPATIENT
Start: 2024-04-12 | End: 2024-04-13 | Stop reason: HOSPADM

## 2024-04-12 RX ORDER — AMOXICILLIN 250 MG
2 CAPSULE ORAL 2 TIMES DAILY
Status: DISCONTINUED | OUTPATIENT
Start: 2024-04-12 | End: 2024-04-13 | Stop reason: HOSPADM

## 2024-04-12 RX ORDER — ATENOLOL 50 MG/1
50 TABLET ORAL NIGHTLY
Status: DISCONTINUED | OUTPATIENT
Start: 2024-04-12 | End: 2024-04-13 | Stop reason: HOSPADM

## 2024-04-12 RX ORDER — SODIUM CHLORIDE 9 MG/ML
40 INJECTION, SOLUTION INTRAVENOUS AS NEEDED
Status: DISCONTINUED | OUTPATIENT
Start: 2024-04-12 | End: 2024-04-13 | Stop reason: HOSPADM

## 2024-04-12 RX ORDER — FLUTICASONE PROPIONATE 50 MCG
2 SPRAY, SUSPENSION (ML) NASAL DAILY PRN
Status: DISCONTINUED | OUTPATIENT
Start: 2024-04-12 | End: 2024-04-13 | Stop reason: HOSPADM

## 2024-04-12 RX ORDER — BISACODYL 5 MG/1
5 TABLET, DELAYED RELEASE ORAL DAILY PRN
Status: DISCONTINUED | OUTPATIENT
Start: 2024-04-12 | End: 2024-04-13 | Stop reason: HOSPADM

## 2024-04-12 RX ORDER — CLONAZEPAM 0.5 MG/1
0.5 TABLET ORAL NIGHTLY PRN
Status: DISCONTINUED | OUTPATIENT
Start: 2024-04-12 | End: 2024-04-12

## 2024-04-12 RX ORDER — ACETAMINOPHEN 325 MG/1
650 TABLET ORAL EVERY 4 HOURS PRN
Status: DISCONTINUED | OUTPATIENT
Start: 2024-04-12 | End: 2024-04-13 | Stop reason: HOSPADM

## 2024-04-12 RX ORDER — ALUMINA, MAGNESIA, AND SIMETHICONE 2400; 2400; 240 MG/30ML; MG/30ML; MG/30ML
15 SUSPENSION ORAL EVERY 6 HOURS PRN
Status: DISCONTINUED | OUTPATIENT
Start: 2024-04-12 | End: 2024-04-13 | Stop reason: HOSPADM

## 2024-04-12 RX ORDER — ONDANSETRON 2 MG/ML
4 INJECTION INTRAMUSCULAR; INTRAVENOUS EVERY 6 HOURS PRN
Status: DISCONTINUED | OUTPATIENT
Start: 2024-04-12 | End: 2024-04-13 | Stop reason: HOSPADM

## 2024-04-12 RX ORDER — ASPIRIN 81 MG/1
324 TABLET, CHEWABLE ORAL ONCE
Status: DISCONTINUED | OUTPATIENT
Start: 2024-04-12 | End: 2024-04-13 | Stop reason: HOSPADM

## 2024-04-12 RX ORDER — CALCIUM CARBONATE/VITAMIN D3 600 MG-10
1 TABLET ORAL 2 TIMES DAILY WITH MEALS
Status: DISCONTINUED | OUTPATIENT
Start: 2024-04-12 | End: 2024-04-12

## 2024-04-12 RX ADMIN — ACETAMINOPHEN 650 MG: 325 TABLET ORAL at 22:12

## 2024-04-12 RX ADMIN — SODIUM CHLORIDE 1000 ML: 9 INJECTION, SOLUTION INTRAVENOUS at 16:19

## 2024-04-12 RX ADMIN — SODIUM CHLORIDE 1000 ML: 9 INJECTION, SOLUTION INTRAVENOUS at 11:39

## 2024-04-12 RX ADMIN — CEFTRIAXONE SODIUM 1000 MG: 1 INJECTION, POWDER, FOR SOLUTION INTRAMUSCULAR; INTRAVENOUS at 16:20

## 2024-04-12 RX ADMIN — AMLODIPINE BESYLATE 5 MG: 5 TABLET ORAL at 16:29

## 2024-04-12 RX ADMIN — PRAVASTATIN SODIUM 20 MG: 20 TABLET ORAL at 22:05

## 2024-04-12 RX ADMIN — ATENOLOL 50 MG: 50 TABLET ORAL at 22:05

## 2024-04-12 RX ADMIN — ONDANSETRON 4 MG: 2 INJECTION INTRAMUSCULAR; INTRAVENOUS at 16:22

## 2024-04-12 RX ADMIN — Medication 10 ML: at 22:04

## 2024-04-12 RX ADMIN — POTASSIUM CHLORIDE, DEXTROSE MONOHYDRATE AND SODIUM CHLORIDE 100 ML/HR: 150; 5; 450 INJECTION, SOLUTION INTRAVENOUS at 22:04

## 2024-04-12 RX ADMIN — ONDANSETRON 4 MG: 2 INJECTION INTRAMUSCULAR; INTRAVENOUS at 11:40

## 2024-04-12 NOTE — H&P
[facility]   HISTORY AND PHYSICAL    Patient Name: Alina Velasquez  : 1945  MRN: [id number]  Primary Care Physician:  Claudia Patel PA-C  Date of admission: 2024    Subjective   Subjective     Chief Complaint: Patient complaining of extreme fatigue and weakness urinalysis shows that she has ketones most likely patient is dehydrated rest of the FURTHER workup appears to be adequate she does have arthritis recently didn't workup for the breast cancer has been negative she has had breast surgery about 26 years ago was treated with chemotherapy radiation therapy and hormonal treatment and no signs and symptoms of recurrence of cancer she has had intermittent loose hematuria and has been extensively worked up by Dr. Butterfield at this time patient does appear to be dehydrated she recently has is some MRIs and x-rays they're all unremarkable be admitted as an observation patient because of the extreme weakness and fatigue we'll give her some IV fluids because it may be because of the dehydration that she has been feeling so weak and tired    HPI: Patient being admitted because of extreme weakness for observation IV fluids will be given empirically will be started on Cipro taxon because of the urinary Estrace being positive    Alina Velasquez is a 78 y.o. female Anders weakness and fatigue with the urinary ketones and esterase positive    Review of Systems   All systems were reviewed and negative except for: Has been reviewed    Personal History     Past Medical History:   Diagnosis Date    Bell's palsy     2008 RIGHT SIDE OF FACE    Bloating 2016    Essential hypertension 2017    Generalized anxiety disorder 2019    Hematuria 10/30/2017    2024    Herpes simplex 2018    History of right breast cancer 2022    Hyperlipemia 2016    Neoplasm of breast, female, malignant     right breast NO CP OR NS R ARM    Osteoarthritis, multiple sites     throughout whole body     Polyarthritis of multiple sites 07/13/2017    PONV (postoperative nausea and vomiting)     Subclinical hypothyroidism 06/17/2016    Tinnitus 07/13/2017    Vertigo 07/13/2017       Past Surgical History:   Procedure Laterality Date    ANKLE SURGERY Left 1995    Lt ankle d/t broken ankle injury    BREAST SURGERY Right     REVISION OF INCISION/SCAR    CATARACT EXTRACTION Bilateral 2008    with lens implant    COLONOSCOPY      2013- 2016- [id number]    CYSTOSCOPY RETROGRADE PYELOGRAM Bilateral 3/14/2024    Procedure: CYSTOSCOPY RETROGRADE PYELOGRAM, CYSTOSCOPY BLADDER BIOPSY AND FULGURATION;  Surgeon: Luciana Dukes MD;  Location: [facility] MAIN OR;  Service: Urology;  Laterality: Bilateral;    ENDOSCOPY      2013- 2020    HYSTERECTOMY      partial     KNEE SURGERY Right 2012    Knee replacement- Dr. Gupta-Adenike    LAPAROSCOPIC CHOLECYSTECTOMY  2015    MASTECTOMY Right 1996    Dr. Trent Sun       Family History: family history includes Arthritis in her mother and sister; Diabetes in her brother; Heart disease in her brother; Hypertension in her brother and mother; Osteoporosis in her mother and sister. Otherwise pertinent FHx was reviewed and not pertinent to current issue.    Social History:  reports that she has never smoked. She has been exposed to tobacco smoke. She has never used smokeless tobacco. She reports that she does not drink alcohol and does not use drugs.    Home Medications:  Calcium Citrate-Vitamin D, Cyanocobalamin, Dentifrices, Omeprazole Magnesium, amLODIPine, atenolol, cetirizine, clonazePAM, fluticasone, guaiFENesin, and pravastatin      Allergies:  Allergies   Allergen Reactions    Contrast Dye (Echo Or Unknown Ct/Mr) Shortness Of Breath       Objective   Objective     Vitals:   Temp:  [97.8 °F (36.6 °C)] 97.8 °F (36.6 °C)  Heart Rate:  [63-97] 70  Resp:  [19] 19  BP: (121-157)/(49-73) 131/60  Physical Exam    Constitutional: Alert and oriented not in acute distress   Eyes: Pupils equal  reactive to light and accommodation   HENT: Normal   Neck: Normal   Respiratory: No rales or rhonchi   Cardiovascular: S1-S2 normal no S3 or S4   Gastrointestinal: No palpable organomegaly   Musculoskeletal: Normal   Neurologic: No localizing signs  Skin: No rash    Result Review    Result Review:  I have personally reviewed the results from the time of this admission to 4/12/2024 15:39 EDT and agree with these findings:  [x]  Laboratory  []  Microbiology  [x]  Radiology  []  EKG/Telemetry   []  Cardiology/Vascular   []  Pathology  [x]  Old records  []  Other:  Most notable findings include: Has been reviewed    Assessment & Plan   Assessment / Plan     Brief Patient Summary:  Alina Velasquez is a 78 y.o. female who Patient has extreme fatigue and weakness possible dehydration no UTI    Active Hospital Problems:  Active Hospital Problems    Diagnosis     **Dehydration        Plan:   IV fluids workup to check for thyroid functions as well as cortisol levels and started empirically on IV antibiotics    DVT prophylaxis:  Mechanical DVT prophylaxis orders are signed and held.          CODE STATUS:         Admission Status:  I believe this patient meets Observation status.    Electronically signed by Pj Alonzo MD, 04/12/24, 3:39 PM EDT.      Part of this note may be an electronic transcription/translation of spoken language to printed text using the Dragon Dictation System.

## 2024-04-12 NOTE — ED PROVIDER NOTES
Time: 3:00 PM EDT  Date of encounter:  4/12/2024  Independent Historian/Clinical History and Information was obtained by:   Patient and Family    History is limited by: N/A    Chief Complaint: Weakness      History of Present Illness:  Patient is a 78 y.o. year old female who presents to the emergency department for evaluation of weakness that started yesterday.  Patient denies fever and chills.  Patient has no cough hemoptysis.  Patient denies dysuria and urinary frequency.  Patient has no subjective neurological doves including numbness or tingling.    HPI    Patient Care Team  Primary Care Provider: Claudia Patel PA-C    Past Medical History:     Allergies   Allergen Reactions    Contrast Dye (Echo Or Unknown Ct/Mr) Shortness Of Breath     Past Medical History:   Diagnosis Date    Bell's palsy     2008 RIGHT SIDE OF FACE    Bloating 06/17/2016    Essential hypertension 03/17/2017    Generalized anxiety disorder 03/04/2019    Hematuria 10/30/2017    2024    Herpes simplex 04/16/2018    History of right breast cancer 02/08/2022    Hyperlipemia 06/17/2016    Neoplasm of breast, female, malignant 1996    right breast NO CP OR NS R ARM    Osteoarthritis, multiple sites 1994    throughout whole body    Polyarthritis of multiple sites 07/13/2017    PONV (postoperative nausea and vomiting)     Subclinical hypothyroidism 06/17/2016    Tinnitus 07/13/2017    Vertigo 07/13/2017     Past Surgical History:   Procedure Laterality Date    ANKLE SURGERY Left 1995    Lt ankle d/t broken ankle injury    BREAST SURGERY Right     REVISION OF INCISION/SCAR    CATARACT EXTRACTION Bilateral 2008    with lens implant    COLONOSCOPY      2013- 2016- [id number]    CYSTOSCOPY RETROGRADE PYELOGRAM Bilateral 3/14/2024    Procedure: CYSTOSCOPY RETROGRADE PYELOGRAM, CYSTOSCOPY BLADDER BIOPSY AND FULGURATION;  Surgeon: Luciana Dukes MD;  Location: [facility] MAIN OR;  Service: Urology;  Laterality: Bilateral;    ENDOSCOPY      2013- 2020     HYSTERECTOMY      partial     KNEE SURGERY Right 2012    Knee replacement- Dr. Gupta-Orth[facility]    LAPAROSCOPIC CHOLECYSTECTOMY  2015    MASTECTOMY Right 1996    Dr. Trent Sun     Family History   Problem Relation Age of Onset    Hypertension Mother     Arthritis Mother     Osteoporosis Mother     Arthritis Sister     Osteoporosis Sister     Heart disease Brother     Hypertension Brother     Diabetes Brother     Malig Hyperthermia Neg Hx        Home Medications:  Prior to Admission medications    Medication Sig Start Date End Date Taking? Authorizing Provider   amLODIPine (NORVASC) 5 MG tablet Take 1 tablet by mouth Daily With Lunch. 1/31/23   Be Peña MD   atenolol (TENORMIN) 50 MG tablet Take 1 tablet by mouth Every Night. 7/19/21   Nurse Taty Gibbs RN   Calcium Citrate-Vitamin D 250-200 MG-UNIT tablet Take  by mouth.    Be Peña MD   cetirizine (zyrTEC) 10 MG tablet Take 1 tablet by mouth Daily As Needed.    Be Peña MD   clonazePAM (KlonoPIN) 0.5 MG tablet Take 1 tablet by mouth At Night As Needed. 7/19/21   Nurse Taty Gibbs RN   Cyanocobalamin (B-12 COMPLIANCE INJECTION IJ) Inject  as directed. Every two weeks    Be Peña MD   Dentifrices (BIOTENE DRY MOUTH CARE DT) Daily As Needed. 1/8/24   Be Peña MD   fluticasone (FLONASE) 50 MCG/ACT nasal spray 2 sprays into the nostril(s) as directed by provider Daily As Needed.    Nurse Taty Gibbs RN   Mucus Relief 600 MG 12 hr tablet Take 1 tablet by mouth 2 (Two) Times a Day. As needed 1/11/24   Be Peña MD   Omeprazole (PRILOSEC PO) Take 20 tablets by mouth Every Other Day.    Nurse Taty Gibbs RN   pravastatin (PRAVACHOL) 20 MG tablet Take 1 tablet by mouth Every Night. 8/7/21   Nurse Taty Gibbs RN        Social History:   Social History     Tobacco Use    Smoking status: Never     Passive exposure: Past    Smokeless tobacco: Never   Vaping Use    Vaping status:  "Never Used   Substance Use Topics    Alcohol use: Never     Comment: does not drink 3/4/2019- 1/29/2019- 1/21/2019- 10/16/2018    Drug use: Never         Review of Systems:  Review of Systems   Constitutional:  Negative for chills and fever.   HENT:  Negative for congestion, rhinorrhea and sore throat.    Eyes:  Negative for pain and visual disturbance.   Respiratory:  Negative for apnea, cough, chest tightness and shortness of breath.    Cardiovascular:  Negative for chest pain and palpitations.   Gastrointestinal:  Negative for abdominal pain, diarrhea, nausea and vomiting.   Genitourinary:  Negative for difficulty urinating and dysuria.   Musculoskeletal:  Negative for joint swelling and myalgias.   Skin:  Negative for color change.   Neurological:  Positive for weakness. Negative for seizures and headaches.   Psychiatric/Behavioral: Negative.     All other systems reviewed and are negative.       Physical Exam:  /62   Pulse 66   Temp 97.8 °F (36.6 °C) (Oral)   Resp 19   Ht 160 cm (63\")   Wt 62.8 kg (138 lb 7.2 oz)   SpO2 98%   BMI 24.53 kg/m²     Physical Exam  Vitals and nursing note reviewed.   Constitutional:       General: She is not in acute distress.     Appearance: Normal appearance. She is not toxic-appearing.   HENT:      Head: Normocephalic and atraumatic.      Jaw: There is normal jaw occlusion.   Eyes:      General: Lids are normal.      Extraocular Movements: Extraocular movements intact.      Conjunctiva/sclera: Conjunctivae normal.      Pupils: Pupils are equal, round, and reactive to light.   Cardiovascular:      Rate and Rhythm: Normal rate and regular rhythm.      Pulses: Normal pulses.      Heart sounds: Normal heart sounds.   Pulmonary:      Effort: Pulmonary effort is normal. No respiratory distress.      Breath sounds: Normal breath sounds. No wheezing or rhonchi.   Abdominal:      General: Abdomen is flat.      Palpations: Abdomen is soft.      Tenderness: There is no " abdominal tenderness. There is no guarding or rebound.   Musculoskeletal:         General: Normal range of motion.      Cervical back: Normal range of motion and neck supple.      Right lower leg: No edema.      Left lower leg: No edema.   Skin:     General: Skin is warm and dry.   Neurological:      Mental Status: She is alert and oriented to person, place, and time. Mental status is at baseline.   Psychiatric:         Mood and Affect: Mood normal.                  Procedures:  Procedures      Medical Decision Making:      Comorbidities that affect care:    Hypertension    External Notes reviewed:    Previous Operation Note: Patient was last seen by Dr. Butterfield for gross hematuria      The following orders were placed and all results were independently analyzed by me:  Orders Placed This Encounter   Procedures    XR Chest 1 View    CT Head Without Contrast    [place] Draw    High Sensitivity Troponin T    Comprehensive Metabolic Panel    Lipase    BNP    Magnesium    CBC Auto Differential    Urinalysis With Microscopic If Indicated (No Culture) - Urine, Clean Catch    Urinalysis, Microscopic Only - Urine, Clean Catch    NPO Diet NPO Type: Strict NPO    Undress & Gown    Orthostatic Vitals (Blood Pressure & Heart Rate)    Straight Cath    Continuous Pulse Oximetry    Straight Cath    Discontinue Cardiac Monitoring    General MD Inpatient Consult    Oxygen Therapy- Nasal Cannula; Titrate 1-6 LPM Per SpO2; 90 - 95%    POC Glucose Once    ECG 12 Lead ED Triage Standing Order; Chest Pain    Insert Peripheral IV    Initiate Observation Status    CBC & Differential    Green Top (Gel)    Lavender Top    Gold Top - SST    Light Blue Top       Medications Given in the Emergency Department:  Medications   sodium chloride 0.9 % flush 10 mL (has no administration in time range)   aspirin chewable tablet 324 mg (324 mg Oral Not Given 4/12/24 1333)   ondansetron (ZOFRAN) injection 4 mg (4 mg Intravenous Given 4/12/24 1140)    sodium chloride 0.9 % bolus 1,000 mL (0 mL Intravenous Stopped 4/12/24 1333)        ED Course:         Labs:    Lab Results (last 24 hours)       Procedure Component Value Units Date/Time    POC Glucose Once [[id number]]  (Abnormal) Collected: 04/12/24 1120    Specimen: Blood Updated: 04/12/24 1125     Glucose 138 mg/dL      Comment: Serial Number: [id number]:  [id number]       High Sensitivity Troponin T [[id number]]  (Normal) Collected: 04/12/24 1132    Specimen: Blood Updated: 04/12/24 1207     HS Troponin T 8 ng/L     Narrative:      High Sensitive Troponin T Reference Range:  <14.0 ng/L- Negative Female for AMI  <22.0 ng/L- Negative Male for AMI  >=14 - Abnormal Female indicating possible myocardial injury.  >=22 - Abnormal Male indicating possible myocardial injury.   Clinicians would have to utilize clinical acumen, EKG, Troponin, and serial changes to determine if it is an Acute Myocardial Infarction or myocardial injury due to an underlying chronic condition.         CBC & Differential [[id number]]  (Normal) Collected: 04/12/24 1132    Specimen: Blood Updated: 04/12/24 1144    Narrative:      The following orders were created for panel order CBC & Differential.  Procedure                               Abnormality         Status                     ---------                               -----------         ------                     CBC Auto Differential[[id number]]        Normal              Final result                 Please view results for these tests on the individual orders.    Comprehensive Metabolic Panel [[id number]]  (Abnormal) Collected: 04/12/24 1132    Specimen: Blood Updated: 04/12/24 1207     Glucose 170 mg/dL      BUN 16 mg/dL      Creatinine 0.84 mg/dL      Sodium 140 mmol/L      Potassium 3.7 mmol/L      Chloride 104 mmol/L      CO2 20.5 mmol/L      Calcium 9.9 mg/dL      Total Protein 7.5 g/dL      Albumin 4.3 g/dL      ALT (SGPT) 32 U/L      AST (SGOT) 27 U/L      Alkaline  Phosphatase 77 U/L      Total Bilirubin 0.5 mg/dL      Globulin 3.2 gm/dL      A/G Ratio 1.3 g/dL      BUN/Creatinine Ratio 19.0     Anion Gap 15.5 mmol/L      eGFR 70.8 mL/min/1.73     Narrative:      GFR Normal >60  Chronic Kidney Disease <60  Kidney Failure <15    The GFR formula is only valid for adults with stable renal function between ages 18 and 70.    Lipase [[id number]]  (Normal) Collected: 04/12/24 1132    Specimen: Blood Updated: 04/12/24 1207     Lipase 19 U/L     BNP [[id number]]  (Normal) Collected: 04/12/24 1132    Specimen: Blood Updated: 04/12/24 1205     proBNP 261.7 pg/mL     Narrative:      This assay is used as an aid in the diagnosis of individuals suspected of having heart failure. It can be used as an aid in the diagnosis of acute decompensated heart failure (ADHF) in patients presenting with signs and symptoms of ADHF to the emergency department (ED). In addition, NT-proBNP of <300 pg/mL indicates ADHF is not likely.    Age Range Result Interpretation  NT-proBNP Concentration (pg/mL:      <50             Positive            >450                   Gray                 300-450                    Negative             <300    50-75           Positive            >900                  Gray                300-900                  Negative            <300      >75             Positive            >1800                  Gray                300-1800                  Negative            <300    Magnesium [[id number]]  (Normal) Collected: 04/12/24 1132    Specimen: Blood Updated: 04/12/24 1207     Magnesium 1.9 mg/dL     CBC Auto Differential [[id number]]  (Normal) Collected: 04/12/24 1132    Specimen: Blood Updated: 04/12/24 1144     WBC 8.37 10*3/mm3      RBC 4.85 10*6/mm3      Hemoglobin 14.5 g/dL      Hematocrit 42.0 %      MCV 86.6 fL      MCH 29.9 pg      MCHC 34.5 g/dL      RDW 12.9 %      RDW-SD 40.4 fl      MPV 9.8 fL      Platelets 309 10*3/mm3      Neutrophil % 62.5 %      Lymphocyte % 27.4 %       Monocyte % 6.2 %      Eosinophil % 2.9 %      Basophil % 0.6 %      Immature Grans % 0.4 %      Neutrophils, Absolute 5.24 10*3/mm3      Lymphocytes, Absolute 2.29 10*3/mm3      Monocytes, Absolute 0.52 10*3/mm3      Eosinophils, Absolute 0.24 10*3/mm3      Basophils, Absolute 0.05 10*3/mm3      Immature Grans, Absolute 0.03 10*3/mm3      nRBC 0.0 /100 WBC     Urinalysis With Microscopic If Indicated (No Culture) - Urine, Clean Catch [[id number]]  (Abnormal) Collected: 04/12/24 1333    Specimen: Urine, Clean Catch Updated: 04/12/24 1409     Color, UA Yellow     Appearance, UA Clear     pH, UA 8.0     Specific Gravity, UA 1.009     Glucose, UA Negative     Ketones, UA Trace     Bilirubin, UA Negative     Blood, UA Trace     Protein, UA Negative     Leuk Esterase, UA Negative     Nitrite, UA Negative     Urobilinogen, UA 0.2 E.U./dL    Urinalysis, Microscopic Only - Urine, Clean Catch [[id number]] Collected: 04/12/24 1333    Specimen: Urine, Clean Catch Updated: 04/12/24 1410     RBC, UA 0-2 /HPF      WBC, UA 0-2 /HPF      Bacteria, UA None Seen /HPF      Squamous Epithelial Cells, UA 0-2 /HPF      Hyaline Casts, UA None Seen /LPF      Methodology Automated Microscopy             Imaging:    CT Head Without Contrast    Result Date: 4/12/2024  CT HEAD WO CONTRAST-  Date of Exam: 4/12/2024 12:10 PM  Indication: AMS. Acute mental status changes  Comparison: 2/28/2023  Technique: Routine transaxial and coronal reconstruction images were obtained through the head without the administration of contrast. Automated exposure control and iterative reconstruction methods were used.   FINDINGS: Brain/Ventricles: There is no acute intracranial hemorrhage, mass effect or midline shift. No abnormal extra-axial fluid collection.  The gray-white differentiation is maintained without evidence of an acute infarct.  There is no evidence of hydrocephalus  Orbits: The visualized portion of the orbits demonstrate no acute abnormality.   Sinuses:The visualized paranasal sinuses and mastoid air cells demonstrate no acute abnormality.  Soft Tissues/Skull: No acute abnormality of the visualized skull or soft tissues.      No acute intracranial abnormality.   Electronically Signed By-Luciano Rai On:4/12/2024 12:53 PM      XR Chest 1 View    Result Date: 4/12/2024  DATE OF EXAM: 4/12/2024 11:46 AM  PROCEDURE: XR CHEST 1 VW-  INDICATIONS: Chest Pain Triage Protocol  COMPARISON: Chest CT February 27, 2024  TECHNIQUE: Single radiographic view of the chest was obtained.  FINDINGS: There is elevation of the right diaphragm. Probable scarring at the right apex, better seen on prior CT. No focal or diffuse pulmonary infiltrate is identified. No pleural effusion or pneumothorax is seen. The heart is enlarged. There is calcific atherosclerosis of the aortic arch. No acute osseous abnormality is identified on this limited single view.      1.  Cardiomegaly. 2.  No radiographic findings of acute pulmonary abnormality.   Electronically Signed By-Salvador Hyatt MD On:4/12/2024 11:55 AM         Differential Diagnosis and Discussion:    Weakness: Based on the patient's history, signs, and symptoms, the diffential diagnosis includes but is not limited to meningitis, stroke, sepsis, subarachnoid hemorrhage, intracranial bleeding, encephalitis, acute uti, dehydration, MS, myasthenia gravis, Guillan [place], migraine variant, neuromuscular disorders vertigo, electrolyte imbalance, and metabolic disorders.    All labs were reviewed and interpreted by me.  CT scan radiology impression was interpreted by me.    MDM     The patient´s CBC that was reviewed and interpreted by me shows no abnormalities of critical concern. Of note, there is no anemia requiring a blood transfusion and the platelet count is acceptable.  The patient´s CMP that was reviewed and interpretted by me shows no abnormalities of critical concern. Of note, the patient´s sodium and potassium are acceptable.  The patient´s liver enzymes are unremarkable. The patient´s renal function (creatinine) is preserved. The patient has a normal anion gap.  Urinalysis is negative for hematuria, ketonuria and bacteriuria.  Bicarb is low and anion gap is slightly elevated.  Patient was given 1 L normal saline bolus emergency department.          Patient Care Considerations:    PERC: I used the PERC score to risk stratify the patient for PE and a CT of the chest was considered but ultimately not indicated in today's visit.      Consultants/Shared Management Plan:    Case was discussed with Dr. Merlin herrera who agrees with admission.    Social Determinants of Health:    Patient is independent, reliable, and has access to care.       Disposition and Care Coordination:    Admit:   Through independent evaluation of the patient's history, physical, and imperical data, the patient meets criteria for inpatient admission to the hospital.        Final diagnoses:   Dehydration        ED Disposition       ED Disposition   Decision to Admit    Condition   --    Comment   Level of Care: Med/Surg [1]   Diagnosis: Dehydration [276.51.ICD-9-CM]                 This medical record created using voice recognition software.             Cy Mccoy MD  04/12/24 1502

## 2024-04-13 VITALS
DIASTOLIC BLOOD PRESSURE: 49 MMHG | RESPIRATION RATE: 18 BRPM | TEMPERATURE: 97.3 F | HEART RATE: 63 BPM | SYSTOLIC BLOOD PRESSURE: 126 MMHG | WEIGHT: 136.69 LBS | BODY MASS INDEX: 24.22 KG/M2 | OXYGEN SATURATION: 98 % | HEIGHT: 63 IN

## 2024-04-13 LAB
ALBUMIN SERPL-MCNC: 3.6 G/DL (ref 3.5–5.2)
ALBUMIN/GLOB SERPL: 1.4 G/DL
ALP SERPL-CCNC: 62 U/L (ref 39–117)
ALT SERPL W P-5'-P-CCNC: 26 U/L (ref 1–33)
ANION GAP SERPL CALCULATED.3IONS-SCNC: 9.4 MMOL/L (ref 5–15)
AST SERPL-CCNC: 20 U/L (ref 1–32)
BACTERIA UR QL AUTO: ABNORMAL /HPF
BASOPHILS # BLD AUTO: 0.04 10*3/MM3 (ref 0–0.2)
BASOPHILS NFR BLD AUTO: 0.5 % (ref 0–1.5)
BILIRUB SERPL-MCNC: 0.3 MG/DL (ref 0–1.2)
BILIRUB UR QL STRIP: NEGATIVE
BUN SERPL-MCNC: 10 MG/DL (ref 8–23)
BUN/CREAT SERPL: 14.1 (ref 7–25)
CALCIUM SPEC-SCNC: 8.7 MG/DL (ref 8.6–10.5)
CHLORIDE SERPL-SCNC: 109 MMOL/L (ref 98–107)
CLARITY UR: CLEAR
CO2 SERPL-SCNC: 21.6 MMOL/L (ref 22–29)
COLOR UR: YELLOW
CREAT SERPL-MCNC: 0.71 MG/DL (ref 0.57–1)
DEPRECATED RDW RBC AUTO: 42.9 FL (ref 37–54)
EGFRCR SERPLBLD CKD-EPI 2021: 86.6 ML/MIN/1.73
EOSINOPHIL # BLD AUTO: 0.25 10*3/MM3 (ref 0–0.4)
EOSINOPHIL NFR BLD AUTO: 2.8 % (ref 0.3–6.2)
ERYTHROCYTE [DISTWIDTH] IN BLOOD BY AUTOMATED COUNT: 13.1 % (ref 12.3–15.4)
FERRITIN SERPL-MCNC: 103.9 NG/ML (ref 13–150)
FOLATE SERPL-MCNC: 13.1 NG/ML (ref 4.78–24.2)
GLOBULIN UR ELPH-MCNC: 2.6 GM/DL
GLUCOSE SERPL-MCNC: 113 MG/DL (ref 65–99)
GLUCOSE UR STRIP-MCNC: NEGATIVE MG/DL
HCT VFR BLD AUTO: 36.4 % (ref 34–46.6)
HCYS SERPL-MCNC: 5.7 UMOL/L (ref 0–15)
HGB BLD-MCNC: 12.2 G/DL (ref 12–15.9)
HGB UR QL STRIP.AUTO: ABNORMAL
HYALINE CASTS UR QL AUTO: ABNORMAL /LPF
IMM GRANULOCYTES # BLD AUTO: 0.02 10*3/MM3 (ref 0–0.05)
IMM GRANULOCYTES NFR BLD AUTO: 0.2 % (ref 0–0.5)
INR PPP: 1.09 (ref 0.86–1.15)
IRON 24H UR-MRATE: 102 MCG/DL (ref 37–145)
IRON SATN MFR SERPL: 29 % (ref 20–50)
KETONES UR QL STRIP: NEGATIVE
LEUKOCYTE ESTERASE UR QL STRIP.AUTO: ABNORMAL
LYMPHOCYTES # BLD AUTO: 2.09 10*3/MM3 (ref 0.7–3.1)
LYMPHOCYTES NFR BLD AUTO: 23.8 % (ref 19.6–45.3)
MCH RBC QN AUTO: 30 PG (ref 26.6–33)
MCHC RBC AUTO-ENTMCNC: 33.5 G/DL (ref 31.5–35.7)
MCV RBC AUTO: 89.4 FL (ref 79–97)
MONOCYTES # BLD AUTO: 0.69 10*3/MM3 (ref 0.1–0.9)
MONOCYTES NFR BLD AUTO: 7.8 % (ref 5–12)
NEUTROPHILS NFR BLD AUTO: 5.71 10*3/MM3 (ref 1.7–7)
NEUTROPHILS NFR BLD AUTO: 64.9 % (ref 42.7–76)
NITRITE UR QL STRIP: NEGATIVE
NRBC BLD AUTO-RTO: 0 /100 WBC (ref 0–0.2)
PH UR STRIP.AUTO: 7 [PH] (ref 5–8)
PLATELET # BLD AUTO: 258 10*3/MM3 (ref 140–450)
PMV BLD AUTO: 10 FL (ref 6–12)
POTASSIUM SERPL-SCNC: 3.9 MMOL/L (ref 3.5–5.2)
PROT SERPL-MCNC: 6.2 G/DL (ref 6–8.5)
PROT UR QL STRIP: NEGATIVE
PROTHROMBIN TIME: 14.3 SECONDS (ref 11.8–14.9)
RBC # BLD AUTO: 4.07 10*6/MM3 (ref 3.77–5.28)
RBC # UR STRIP: ABNORMAL /HPF
REF LAB TEST METHOD: ABNORMAL
RETICS # AUTO: 0.07 10*6/MM3 (ref 0.02–0.13)
RETICS/RBC NFR AUTO: 1.84 % (ref 0.7–1.9)
SODIUM SERPL-SCNC: 140 MMOL/L (ref 136–145)
SP GR UR STRIP: 1.01 (ref 1–1.03)
SQUAMOUS #/AREA URNS HPF: ABNORMAL /HPF
TIBC SERPL-MCNC: 356 MCG/DL (ref 298–536)
TRANSFERRIN SERPL-MCNC: 239 MG/DL (ref 200–360)
UROBILINOGEN UR QL STRIP: ABNORMAL
VIT B12 BLD-MCNC: 601 PG/ML (ref 211–946)
WBC # UR STRIP: ABNORMAL /HPF
WBC NRBC COR # BLD AUTO: 8.8 10*3/MM3 (ref 3.4–10.8)

## 2024-04-13 PROCEDURE — 83521 IG LIGHT CHAINS FREE EACH: CPT | Performed by: INTERNAL MEDICINE

## 2024-04-13 PROCEDURE — 85045 AUTOMATED RETICULOCYTE COUNT: CPT | Performed by: INTERNAL MEDICINE

## 2024-04-13 PROCEDURE — 82728 ASSAY OF FERRITIN: CPT | Performed by: INTERNAL MEDICINE

## 2024-04-13 PROCEDURE — 83090 ASSAY OF HOMOCYSTEINE: CPT | Performed by: INTERNAL MEDICINE

## 2024-04-13 PROCEDURE — 81001 URINALYSIS AUTO W/SCOPE: CPT | Performed by: INTERNAL MEDICINE

## 2024-04-13 PROCEDURE — 86334 IMMUNOFIX E-PHORESIS SERUM: CPT | Performed by: INTERNAL MEDICINE

## 2024-04-13 PROCEDURE — G0378 HOSPITAL OBSERVATION PER HR: HCPCS

## 2024-04-13 PROCEDURE — 92610 EVALUATE SWALLOWING FUNCTION: CPT

## 2024-04-13 PROCEDURE — 83540 ASSAY OF IRON: CPT | Performed by: INTERNAL MEDICINE

## 2024-04-13 PROCEDURE — 82746 ASSAY OF FOLIC ACID SERUM: CPT | Performed by: INTERNAL MEDICINE

## 2024-04-13 PROCEDURE — 85025 COMPLETE CBC W/AUTO DIFF WBC: CPT | Performed by: INTERNAL MEDICINE

## 2024-04-13 PROCEDURE — 36415 COLL VENOUS BLD VENIPUNCTURE: CPT | Performed by: INTERNAL MEDICINE

## 2024-04-13 PROCEDURE — 80053 COMPREHEN METABOLIC PANEL: CPT | Performed by: INTERNAL MEDICINE

## 2024-04-13 PROCEDURE — 84466 ASSAY OF TRANSFERRIN: CPT | Performed by: INTERNAL MEDICINE

## 2024-04-13 PROCEDURE — 85610 PROTHROMBIN TIME: CPT | Performed by: INTERNAL MEDICINE

## 2024-04-13 PROCEDURE — 82607 VITAMIN B-12: CPT | Performed by: INTERNAL MEDICINE

## 2024-04-13 PROCEDURE — 82784 ASSAY IGA/IGD/IGG/IGM EACH: CPT | Performed by: INTERNAL MEDICINE

## 2024-04-13 RX ORDER — SULFAMETHOXAZOLE AND TRIMETHOPRIM 800; 160 MG/1; MG/1
1 TABLET ORAL EVERY 12 HOURS SCHEDULED
Qty: 14 TABLET | Refills: 0 | Status: SHIPPED | OUTPATIENT
Start: 2024-04-13 | End: 2024-04-20

## 2024-04-13 RX ORDER — SULFAMETHOXAZOLE AND TRIMETHOPRIM 800; 160 MG/1; MG/1
1 TABLET ORAL EVERY 12 HOURS SCHEDULED
Status: DISCONTINUED | OUTPATIENT
Start: 2024-04-13 | End: 2024-04-13 | Stop reason: HOSPADM

## 2024-04-13 RX ADMIN — SULFAMETHOXAZOLE AND TRIMETHOPRIM 1 TABLET: 800; 160 TABLET ORAL at 11:45

## 2024-04-13 RX ADMIN — POTASSIUM CHLORIDE, DEXTROSE MONOHYDRATE AND SODIUM CHLORIDE 100 ML/HR: 150; 5; 450 INJECTION, SOLUTION INTRAVENOUS at 06:34

## 2024-04-13 RX ADMIN — Medication 10 ML: at 09:59

## 2024-04-13 NOTE — THERAPY EVALUATION
Acute Care - Speech Language Pathology   Swallow Initial Evaluation  Atilio     Patient Name: Alina Velasquez  : 1945  MRN: [id number]  Today's Date: 2024               Admit Date: 2024    Visit Dx:     ICD-10-CM ICD-9-CM   1. Dehydration  E86.0 276.51     Patient Active Problem List   Diagnosis    Bloating    History of right breast cancer    Diverticulosis    Generalized anxiety disorder    GERD (gastroesophageal reflux disease)    Hematuria    Herpes simplex    Hyperlipidemia    Essential hypertension    Osteoarthritis    Subclinical hypothyroidism    Tinnitus    Dizziness    Right upper lobe pulmonary nodule    Cervicalgia    Dehydration    Dehydration, severe     Past Medical History:   Diagnosis Date    Bell's palsy     2008 RIGHT SIDE OF FACE    Bloating 2016    Essential hypertension 2017    Generalized anxiety disorder 2019    Hematuria 10/30/2017    2024    Herpes simplex 2018    History of right breast cancer 2022    Hyperlipemia 2016    Neoplasm of breast, female, malignant     right breast NO CP OR NS R ARM    Osteoarthritis, multiple sites 1994    throughout whole body    Polyarthritis of multiple sites 2017    PONV (postoperative nausea and vomiting)     Subclinical hypothyroidism 2016    Tinnitus 2017    Vertigo 2017     Past Surgical History:   Procedure Laterality Date    ANKLE SURGERY Left     Lt ankle d/t broken ankle injury    BREAST SURGERY Right     REVISION OF INCISION/SCAR    CATARACT EXTRACTION Bilateral     with lens implant    COLONOSCOPY      2013- 2016- [id number]    CYSTOSCOPY RETROGRADE PYELOGRAM Bilateral 3/14/2024    Procedure: CYSTOSCOPY RETROGRADE PYELOGRAM, CYSTOSCOPY BLADDER BIOPSY AND FULGURATION;  Surgeon: Luciana Dukes MD;  Location: [facility] MAIN OR;  Service: Urology;  Laterality: Bilateral;    ENDOSCOPY      2013- 2020    HYSTERECTOMY      partial     KNEE SURGERY Right      Knee replacement- Dr. Gupta-Ortho    LAPAROSCOPIC CHOLECYSTECTOMY  2015    MASTECTOMY Right 1996    Dr. Trent Sun         Inpatient Speech Pathology Dysphagia Evaluation        PAIN SCALE: None noted    PRECAUTIONS/CONTRAINDICATIONS: None noted    SUSPECTED ABUSE/NEGLECT/EXPLOITATION: None noted    SOCIAL/PSYCHOLOGICAL NEEDS/BARRIERS: None noted    PAST SOCIAL HISTORY: Lives at home    PRIOR FUNCTION: Independent    PATIENT GOALS/EXPECTATIONS: Did not report    HISTORY: This patient is a 78-year-old female admitted with the above diagnosis.    CURRENT DIET LEVEL: Regular diet-thin liquids    OBJECTIVE:    TEST ADMINISTERED: Clinical dysphagia evaluation    COGNITION/SAFETY AWARENESS: Alert and oriented    BEHAVIORAL OBSERVATIONS: Pleasant and cooperative    ORAL MOTOR EXAM: Lingual and labial strength and range of motion within functional limits    VOICE QUALITY: Within normal limits    REFLEX EXAM: Deferred    POSTURE: 90 degrees upright    FEEDING/SWALLOWING FUNCTION: Assessed with full range of consistencies with thin liquids from spoon cup and straw, purée consistency soft and hard solids    CLINICAL OBSERVATIONS: Oral stage is characterized by good bolus preparation and control.  Timely oral transit.  Pharyngeal phase is timely with good laryngeal elevation per palpation.  No clinical signs or symptoms of aspiration are noted.  Vocal quality remained clear to auscultation.  Denies globus sensation after completion of swallow    DYSPHAGIA CRITERIA: N/A    FUNCTIONAL ASSESSMENT INSTRUMENT: Patient currently scored a level 7 of 7 on Functional Communication Measures for swallowing indicating a 0% limitation in function.    ASSESSMENT/ PLAN OF CARE:  Pt presents with functional oropharyngeal swallow.  No clinical signs or symptoms of aspiration noted.  Vocal quality may clear to auscultation.      REHAB POTENTIAL:  Pt has good rehab potential.  The following limitations may influence improvement/ length of tx  medical status.    RECOMMENDATIONS:   1.   DIET: Regular diet-thin liquids    2.  POSITION: 90 degrees upright    3.  COMPENSATORY STRATEGIES: General swallow precautions    No further dysphagia therapy is indicated at this time.  Available for reconsult should medical status warrant.    Pt/responsible party agrees with plan of care and has been informed of all alternatives, risks and benefits.        EDUCATION  The patient has been educated in the following areas:   Dysphagia (Swallowing Impairment).     Anais Peterson, SLP  4/13/2024

## 2024-04-13 NOTE — DISCHARGE SUMMARY
[facility]         DISCHARGE SUMMARY    Patient Name: Alina Velasquez  : 1945  MRN: [id number]    Date of Admission: 2024  Date of Discharge: 2024  Primary Care Physician: Claudia Patel PA-C    Consults       Date and Time Order Name Status Description    2024  2:25 PM General MD Inpatient Consult              Presenting Problem:   Dehydration [E86.0]  Dehydration, severe [E86.0]    Active and Resolved Hospital Problems:  Active Hospital Problems    Diagnosis POA    **Dehydration [E86.0] Yes    Dehydration, severe [E86.0] Yes      Resolved Hospital Problems   No resolved problems to display.         Hospital Course     Hospital Course:  Alina Velasquez is a 78 y.o. female patient was admitted because of extreme weakness workup has been negative previously she has had workup done by oncologist which did not show evidence of any metastatic cancer she does have hematuria of unknown etiology and she is being regularly followed by urologist at this time she did have Estrace positive and some WBCs there is a possibility of UTI empirically she will be treated with Septra but there are no other abnormalities no fever no leukocytosis and therefore she is being discharged home some of the lab work is still pending which will be followed by the primary care physician      DISCHARGE Follow Up Recommendations for labs and diagnostics: Labs with B12 folic acid levels are still pending cortisol level was normal thyroid function was normal weakness extremities really not clear      Pertinent  and/or Most Recent Results     LAB RESULTS:      Lab 24  0539 24  2127 24  1132   WBC 8.80 9.49 8.37   HEMOGLOBIN 12.2 13.0 14.5   HEMATOCRIT 36.4 38.8 42.0   PLATELETS 258 266 309   NEUTROS ABS 5.71  --  5.24   IMMATURE GRANS (ABS) 0.02  --  0.03   LYMPHS ABS 2.09  --  2.29   MONOS ABS 0.69  --  0.52   EOS ABS 0.25  --  0.24   MCV 89.4 89.0 86.6   PROTIME 14.3  --   --           Lab 04/13/24  0539 04/12/24 2127 04/12/24  1614 04/12/24  1132   SODIUM 140 143  --  140   POTASSIUM 3.9 3.8  --  3.7   CHLORIDE 109* 109*  --  104   CO2 21.6* 22.7  --  20.5*   ANION GAP 9.4 11.3  --  15.5*   BUN 10 11  --  16   CREATININE 0.71 0.71  --  0.84   EGFR 86.6 86.6  --  70.8   GLUCOSE 113* 98  --  170*   CALCIUM 8.7 8.7  --  9.9   MAGNESIUM  --   --   --  1.9   TSH  --   --  2.060  --          Lab 04/13/24 0539 04/12/24 2127 04/12/24  1132   TOTAL PROTEIN 6.2 6.6 7.5   ALBUMIN 3.6 3.9 4.3   GLOBULIN 2.6 2.7 3.2   ALT (SGPT) 26 29 32   AST (SGOT) 20 24 27   BILIRUBIN 0.3 0.3 0.5   ALK PHOS 62 66 77   LIPASE  --   --  19         Lab 04/13/24  0539 04/12/24  1132   PROBNP  --  261.7   HSTROP T  --  8   PROTIME 14.3  --    INR 1.09  --              Lab 04/13/24  0539   IRON 102   IRON SATURATION (TSAT) 29   TIBC 356   TRANSFERRIN 239   FERRITIN 103.90         Brief Urine Lab Results  (Last result in the past 365 days)        Color   Clarity   Blood   Leuk Est   Nitrite   Protein   CREAT   Urine HCG        04/13/24 0334 Yellow   Clear   Trace   Trace   Negative   Negative                 Microbiology Results (last 10 days)       ** No results found for the last 240 hours. **            CT Head Without Contrast    Result Date: 4/12/2024  Impression: No acute intracranial abnormality.   Electronically Signed By-Luciano Rai On:4/12/2024 12:53 PM      XR Chest 1 View    Result Date: 4/12/2024  Impression: 1.  Cardiomegaly. 2.  No radiographic findings of acute pulmonary abnormality.   Electronically Signed By-Salvador Hyatt MD On:4/12/2024 11:55 AM      MRI Brain With & Without Contrast    Result Date: 3/29/2024  Impression: 1.  No acute intracranial process identified. 2.  Findings suggestive of mild chronic small vessel ischemic disease.   Electronically Signed By-Austin Mcdonald MD On:3/29/2024 3:18 PM      MRI Cervical Spine With Contrast    Result Date: 3/29/2024  Impression: Impression:  1.  Multilevel degenerative disc disease and degenerative facet change but without significant canal stenosis there is only mild bilateral neural foraminal narrowing at C5/6 level.   Electronically Signed By-Darrin Doyle MD On:3/29/2024 2:40 PM                   Labs Pending at Discharge:  Pending Labs       Order Current Status    Folate In process    Homocysteine In process    Immunofixation, Serum In process    Immunoglobulin Free LT Chains Blood In process    Vitamin B12 In process              Discharge Details        Discharge Medications        New Medications        Instructions Start Date   sulfamethoxazole-trimethoprim 800-160 MG per tablet  Commonly known as: BACTRIM DS,SEPTRA DS   1 tablet, Oral, Every 12 Hours Scheduled             Continue These Medications        Instructions Start Date   amLODIPine 5 MG tablet  Commonly known as: NORVASC   1 tablet, Oral, Daily With Lunch      atenolol 50 MG tablet  Commonly known as: TENORMIN   50 mg, Oral, Nightly      cetirizine 10 MG tablet  Commonly known as: zyrTEC   10 mg, Oral, Daily PRN      clonazePAM 0.5 MG tablet  Commonly known as: KlonoPIN   0.125-0.25 mg, Oral, Nightly PRN      fluticasone 50 MCG/ACT nasal spray  Commonly known as: FLONASE   2 sprays, Nasal, Daily PRN      omeprazole 20 MG capsule  Commonly known as: priLOSEC   20 mg, Oral, Every Other Day      pravastatin 20 MG tablet  Commonly known as: PRAVACHOL   20 mg, Oral, Nightly               Allergies   Allergen Reactions    Contrast Dye (Echo Or Unknown Ct/Mr) Shortness Of Breath         Discharge Disposition:  Home or Self Care    Diet:  Hospital:  Diet Order   Procedures    Diet: Regular/House; Fluid Consistency: Thin (IDDSI 0)         Discharge Activity: As tolerated        CODE STATUS:  Code Status and Medical Interventions:   Ordered at: 04/12/24 2044     Level Of Support Discussed With:    Patient     Code Status (Patient has no pulse and is not breathing):    CPR (Attempt to  Resuscitate)     Medical Interventions (Patient has pulse or is breathing):    Full Support         Future Appointments   Date Time Provider Department Center   8/20/2024 11:00 AM [facility] 1 [facility] MAMMO [facility]   8/20/2024 11:30 AM [facility] 3 [facility]   8/22/2024 11:15 AM Goran Yepez MD [facility] ONC E521 [facility]   9/12/2024  1:45 PM Maci Wilkins APRN [facility] GE ETW [facility]   1/29/2025  1:00 PM Luciana Dukes MD [facility] U ETRING [facility]           Time spent on Discharge including face to face service: 45 minutes    Electronically signed by Pj Alonzo MD, 04/13/24, 10:38 AM EDT.    Part of this note may be an electronic transcription/translation of spoken language to printed text using the Dragon Dictation System.

## 2024-04-13 NOTE — PLAN OF CARE
Goal Outcome Evaluation:  Plan of Care Reviewed With: patient, family        Progress: no change  Outcome Evaluation: Pt was new ED admission this shift. Family was briefly at bedside. Started continuous IV fluids per MAR. Pt c/o pain/discomfort this shift, administered prn tylenol per MAR. Pt is currently resting with no apparent distress at this time, call light in reach. No new issues or new needs notded at this time.

## 2024-04-13 NOTE — PLAN OF CARE
Goal Outcome Evaluation:  Plan of Care Reviewed With: patient           Outcome Evaluation: Patient A/Ox4. On room air. Standby assist. No complaints of pain. Discharging home. No other issues/needs at this time.

## 2024-04-13 NOTE — PLAN OF CARE
Goal Outcome Evaluation:                      FUNCTIONAL ASSESSMENT INSTRUMENT: Patient currently scored a level 7 of 7 on Functional Communication Measures for swallowing indicating a 0% limitation in function.    ASSESSMENT/ PLAN OF CARE:  Pt presents with functional oropharyngeal swallow.  No clinical signs or symptoms of aspiration noted.  Vocal quality may clear to auscultation.      REHAB POTENTIAL:  Pt has good rehab potential.  The following limitations may influence improvement/ length of tx medical status.    RECOMMENDATIONS:   1.   DIET: Regular diet-thin liquids    2.  POSITION: 90 degrees upright    3.  COMPENSATORY STRATEGIES: General swallow precautions    No further dysphagia therapy is indicated at this time.  Available for reconsult should medical status warrant.    Pt/responsible party agrees with plan of care and has been informed of all alternatives, risks and benefits.

## 2024-04-14 ENCOUNTER — READMISSION MANAGEMENT (OUTPATIENT)
Dept: CALL CENTER | Facility: HOSPITAL | Age: 79
End: 2024-04-14
Payer: MEDICARE

## 2024-04-14 NOTE — OUTREACH NOTE
Prep Survey      Flowsheet Row Responses   [facility] facility patient discharged from? [facility]   Is LACE score < 7 ? Yes   Eligibility Not Eligible   What are the reasons patient is not eligible? Other  [Low risk for readmission]   Does the patient have one of the following disease processes/diagnoses(primary or secondary)? Other   Prep survey completed? Yes            Emma JACQUES - Registered Nurse

## 2024-04-16 LAB
IGA SERPL-MCNC: 351 MG/DL (ref 64–422)
IGG SERPL-MCNC: 852 MG/DL (ref 586–1602)
IGM SERPL-MCNC: 92 MG/DL (ref 26–217)
KAPPA LC FREE SER-MCNC: 27.2 MG/L (ref 3.3–19.4)
KAPPA LC FREE/LAMBDA FREE SER: 1.88 {RATIO} (ref 0.26–1.65)
LAMBDA LC FREE SERPL-MCNC: 14.5 MG/L (ref 5.7–26.3)
PROT PATTERN SERPL IFE-IMP: NORMAL

## 2024-05-08 ENCOUNTER — HOSPITAL ENCOUNTER (OUTPATIENT)
Dept: CARDIOLOGY | Facility: HOSPITAL | Age: 79
Discharge: HOME | End: 2024-05-08
Payer: MEDICARE

## 2024-05-08 LAB
BH CV XLRA MEAS LEFT CAROTID BULB EDV: -14.7 CM/SEC
BH CV XLRA MEAS LEFT CAROTID BULB PSV: -58.9 CM/SEC
BH CV XLRA MEAS LEFT DIST CCA EDV: -18.2 CM/SEC
BH CV XLRA MEAS LEFT DIST CCA PSV: -82.3 CM/SEC
BH CV XLRA MEAS LEFT DIST ICA EDV: -31.2 CM/SEC
BH CV XLRA MEAS LEFT DIST ICA PSV: -117 CM/SEC
BH CV XLRA MEAS LEFT ICA/CCA RATIO: 1.15
BH CV XLRA MEAS LEFT MID ICA EDV: -28.6 CM/SEC
BH CV XLRA MEAS LEFT MID ICA PSV: -86.6 CM/SEC
BH CV XLRA MEAS LEFT PROX CCA EDV: 22.5 CM/SEC
BH CV XLRA MEAS LEFT PROX CCA PSV: 113.5 CM/SEC
BH CV XLRA MEAS LEFT PROX ECA EDV: -11.3 CM/SEC
BH CV XLRA MEAS LEFT PROX ECA PSV: -116.1 CM/SEC
BH CV XLRA MEAS LEFT PROX ICA EDV: 24.3 CM/SEC
BH CV XLRA MEAS LEFT PROX ICA PSV: 94.4 CM/SEC
BH CV XLRA MEAS LEFT VERTEBRAL A EDV: -15.6 CM/SEC
BH CV XLRA MEAS LEFT VERTEBRAL A PSV: -57.2 CM/SEC
BH CV XLRA MEAS RIGHT CAROTID BULB EDV: -12.8 CM/SEC
BH CV XLRA MEAS RIGHT CAROTID BULB PSV: -52.1 CM/SEC
BH CV XLRA MEAS RIGHT DIST CCA EDV: 13.7 CM/SEC
BH CV XLRA MEAS RIGHT DIST CCA PSV: 72.1 CM/SEC
BH CV XLRA MEAS RIGHT DIST ICA EDV: 19.9 CM/SEC
BH CV XLRA MEAS RIGHT DIST ICA PSV: 83.2 CM/SEC
BH CV XLRA MEAS RIGHT ICA/CCA RATIO: 1.39
BH CV XLRA MEAS RIGHT MID ICA EDV: -19.8 CM/SEC
BH CV XLRA MEAS RIGHT MID ICA PSV: -80.1 CM/SEC
BH CV XLRA MEAS RIGHT PROX CCA EDV: -14.3 CM/SEC
BH CV XLRA MEAS RIGHT PROX CCA PSV: -81.4 CM/SEC
BH CV XLRA MEAS RIGHT PROX ECA EDV: -16.5 CM/SEC
BH CV XLRA MEAS RIGHT PROX ECA PSV: -195.4 CM/SEC
BH CV XLRA MEAS RIGHT PROX ICA EDV: -25.2 CM/SEC
BH CV XLRA MEAS RIGHT PROX ICA PSV: -99.9 CM/SEC
BH CV XLRA MEAS RIGHT VERTEBRAL A EDV: -10.4 CM/SEC
BH CV XLRA MEAS RIGHT VERTEBRAL A PSV: -25.1 CM/SEC
LEFT ARM BP: NORMAL MMHG

## 2024-05-08 PROCEDURE — 93880 EXTRACRANIAL BILAT STUDY: CPT

## 2024-08-02 ENCOUNTER — TELEPHONE (OUTPATIENT)
Dept: ONCOLOGY | Facility: HOSPITAL | Age: 79
End: 2024-08-02
Payer: MEDICARE

## 2024-08-02 NOTE — TELEPHONE ENCOUNTER
Caller: Alina Velasquez    Relationship to patient: Self    Best call back number: [phone]    Chief complaint: SCHEDULING    Type of visit: FOLLOW UP 1    Requested date: NEXT AVLIABLE AROUND 11    If rescheduling, when is the original appointment: 8-26

## 2024-08-09 ENCOUNTER — TRANSCRIBE ORDERS (OUTPATIENT)
Dept: GENERAL RADIOLOGY | Facility: HOSPITAL | Age: 79
End: 2024-08-09
Payer: MEDICARE

## 2024-08-09 DIAGNOSIS — M79.641 PAIN IN RIGHT HAND: Primary | ICD-10-CM

## 2024-08-09 DIAGNOSIS — M79.642 PAIN IN LEFT HAND: ICD-10-CM

## 2024-08-12 ENCOUNTER — HOSPITAL ENCOUNTER (OUTPATIENT)
Dept: GENERAL RADIOLOGY | Facility: HOSPITAL | Age: 79
Discharge: HOME | End: 2024-08-12
Payer: MEDICARE

## 2024-08-12 DIAGNOSIS — M79.642 PAIN IN LEFT HAND: ICD-10-CM

## 2024-08-12 DIAGNOSIS — M79.641 PAIN IN RIGHT HAND: ICD-10-CM

## 2024-08-12 PROCEDURE — 73130 X-RAY EXAM OF HAND: CPT

## 2024-08-21 ENCOUNTER — HOSPITAL ENCOUNTER (OUTPATIENT)
Dept: MAMMOGRAPHY | Facility: HOSPITAL | Age: 79
Discharge: HOME | End: 2024-08-21
Payer: MEDICARE

## 2024-08-21 ENCOUNTER — HOSPITAL ENCOUNTER (OUTPATIENT)
Dept: ULTRASOUND IMAGING | Facility: HOSPITAL | Age: 79
Discharge: HOME | End: 2024-08-21
Payer: MEDICARE

## 2024-08-21 DIAGNOSIS — R92.8 ABNORMAL MAMMOGRAM OF LEFT BREAST: ICD-10-CM

## 2024-08-21 PROCEDURE — G0279 TOMOSYNTHESIS, MAMMO: HCPCS

## 2024-08-21 PROCEDURE — 77065 DX MAMMO INCL CAD UNI: CPT

## 2024-08-22 ENCOUNTER — OFFICE VISIT (OUTPATIENT)
Dept: ONCOLOGY | Facility: HOSPITAL | Age: 79
End: 2024-08-22
Payer: MEDICARE

## 2024-08-22 VITALS
DIASTOLIC BLOOD PRESSURE: 68 MMHG | HEART RATE: 68 BPM | HEIGHT: 62 IN | TEMPERATURE: 97 F | BODY MASS INDEX: 26.57 KG/M2 | OXYGEN SATURATION: 100 % | RESPIRATION RATE: 16 BRPM | WEIGHT: 144.4 LBS | SYSTOLIC BLOOD PRESSURE: 152 MMHG

## 2024-08-22 DIAGNOSIS — R92.8 ABNORMAL MAMMOGRAM OF LEFT BREAST: Primary | ICD-10-CM

## 2024-08-22 DIAGNOSIS — Z85.3 HISTORY OF RIGHT BREAST CANCER: ICD-10-CM

## 2024-08-22 PROCEDURE — 1159F MED LIST DOCD IN RCRD: CPT | Performed by: NURSE PRACTITIONER

## 2024-08-22 PROCEDURE — G0463 HOSPITAL OUTPT CLINIC VISIT: HCPCS | Performed by: NURSE PRACTITIONER

## 2024-08-22 PROCEDURE — 3077F SYST BP >= 140 MM HG: CPT | Performed by: NURSE PRACTITIONER

## 2024-08-22 PROCEDURE — 99214 OFFICE O/P EST MOD 30 MIN: CPT | Performed by: NURSE PRACTITIONER

## 2024-08-22 PROCEDURE — 1126F AMNT PAIN NOTED NONE PRSNT: CPT | Performed by: NURSE PRACTITIONER

## 2024-08-22 PROCEDURE — 1160F RVW MEDS BY RX/DR IN RCRD: CPT | Performed by: NURSE PRACTITIONER

## 2024-08-22 PROCEDURE — 3078F DIAST BP <80 MM HG: CPT | Performed by: NURSE PRACTITIONER

## 2024-08-22 RX ORDER — CHOLECALCIFEROL (VITAMIN D3) 125 MCG
1 TABLET ORAL DAILY
COMMUNITY

## 2024-08-22 NOTE — PROGRESS NOTES
Chief Complaint/Reason for Referral:  follow up- breast cancer/mastectomy on right side    Claudia Patel PA-C Morrow, Beth E, PA-C    Records Obtained:  Records of the patients history including those obtained from  [facility] and patient information were reviewed and summarized in detail.    Subjective    History of Present Illness    Ms. Alina Velasquez presents today for 1 year  follow up for breast cancer. History of right sided breast cancer 28 years ago and completed chemo, mastectomy and radiation and did 5 years of endocrine therapy.     Right breast mastectomy.    Left breast in February 2024 at the 2:00 position a tiny eccentric cleft consistent with a LN. Repeat diagnostic mammogram done yesterday on 8/21/2024. Again, noted is a tiny nodule in the anterior left breast at the 2:00 position is stable and recommends for short term follow up in February of 2025.     Denies any HA, associated cough / shortness of breath or persistent back pain.       Oncology/Hematology History    No history exists.       Review of Systems   Constitutional: Negative.    HENT: Negative.     Eyes: Negative.    Respiratory: Negative.     Cardiovascular: Negative.    Gastrointestinal: Negative.    Endocrine: Negative.    Genitourinary: Negative.    Musculoskeletal: Negative.    Skin: Negative.    Allergic/Immunologic: Negative.    Neurological: Negative.    Hematological: Negative.    Psychiatric/Behavioral: Negative.     All other systems reviewed and are negative.      Current Outpatient Medications on File Prior to Visit   Medication Sig Dispense Refill    amLODIPine (NORVASC) 5 MG tablet Take 1 tablet by mouth Daily With Lunch.      atenolol (TENORMIN) 50 MG tablet Take 1 tablet by mouth Every Night.      Ergocalciferol (Vitamin D2) 50 MCG (2000 UT) tablet Take 1 tablet by mouth Daily.      Loratadine (CLARITIN PO) Take 1 tablet by mouth Daily.      omeprazole (priLOSEC) 20 MG capsule Take 1 capsule by mouth Every Other Day.       pravastatin (PRAVACHOL) 20 MG tablet Take 1 tablet by mouth Every Night.      cetirizine (zyrTEC) 10 MG tablet Take 1 tablet by mouth Daily As Needed. (Patient not taking: Reported on 8/22/2024)      clonazePAM (KlonoPIN) 0.5 MG tablet Take 0.25-0.5 tablets by mouth At Night As Needed. (Patient not taking: Reported on 8/22/2024)      fluticasone (FLONASE) 50 MCG/ACT nasal spray 2 sprays into the nostril(s) as directed by provider Daily As Needed. (Patient not taking: Reported on 8/22/2024)       No current facility-administered medications on file prior to visit.       Allergies   Allergen Reactions    Contrast Dye (Echo Or Unknown Ct/Mr) Shortness Of Breath     Past Medical History:   Diagnosis Date    Bell's palsy     2008 RIGHT SIDE OF FACE    Bloating 06/17/2016    Essential hypertension 03/17/2017    Generalized anxiety disorder 03/04/2019    Hematuria 10/30/2017    2024    Herpes simplex 04/16/2018    History of right breast cancer 02/08/2022    Hyperlipemia 06/17/2016    Neoplasm of breast, female, malignant 1996    right breast NO CP OR NS R ARM    Osteoarthritis, multiple sites 1994    throughout whole body    Polyarthritis of multiple sites 07/13/2017    PONV (postoperative nausea and vomiting)     Subclinical hypothyroidism 06/17/2016    Tinnitus 07/13/2017    Vertigo 07/13/2017     Past Surgical History:   Procedure Laterality Date    ANKLE SURGERY Left 1995    Lt ankle d/t broken ankle injury    BREAST SURGERY Right     REVISION OF INCISION/SCAR    CATARACT EXTRACTION Bilateral 2008    with lens implant    COLONOSCOPY      2013- 2016- [id number]    CYSTOSCOPY RETROGRADE PYELOGRAM Bilateral 3/14/2024    Procedure: CYSTOSCOPY RETROGRADE PYELOGRAM, CYSTOSCOPY BLADDER BIOPSY AND FULGURATION;  Surgeon: Luciana Dukes MD;  Location: [facility] MAIN OR;  Service: Urology;  Laterality: Bilateral;    ENDOSCOPY      2013- 2020    HYSTERECTOMY      partial     KNEE SURGERY Right 2012    Knee replacement-   Alonso-Adenike    LAPAROSCOPIC CHOLECYSTECTOMY  2015    MASTECTOMY Right 1996    Dr. Trent Sun     Social History     Socioeconomic History    Marital status:    Tobacco Use    Smoking status: Never     Passive exposure: Past    Smokeless tobacco: Never   Vaping Use    Vaping status: Never Used   Substance and Sexual Activity    Alcohol use: Never     Comment: does not drink 3/4/2019- 1/29/2019- 1/21/2019- 10/16/2018    Drug use: Never    Sexual activity: Defer     Family History   Problem Relation Age of Onset    Hypertension Mother     Arthritis Mother     Osteoporosis Mother     Arthritis Sister     Osteoporosis Sister     Heart disease Brother     Hypertension Brother     Diabetes Brother     Malig Hyperthermia Neg Hx      Immunization History   Administered Date(s) Administered    COVID-19 (MODERNA) 1st,2nd,3rd Dose Monovalent 02/12/2021, 03/12/2021, 12/03/2021    COVID-19 (MODERNA) BIVALENT 12+YRS 10/11/2022    Fluad Quad 65+ 08/12/2022    Influenza Injectable Mdck Pf Quad 10/14/2021    Influenza, Unspecified 10/06/2020, 10/14/2021    Pneumococcal Conjugate 13-Valent (PCV13) 11/20/2014, 10/16/2018    Tdap 11/20/2014, 08/03/2023       Tobacco Use: Low Risk  (8/22/2024)    Patient History     Smoking Tobacco Use: Never     Smokeless Tobacco Use: Never     Passive Exposure: Past       Objective     Physical Exam  Vitals and nursing note reviewed.   Constitutional:       Appearance: Normal appearance.   HENT:      Head: Normocephalic.      Mouth/Throat:      Mouth: Mucous membranes are moist.   Eyes:      Pupils: Pupils are equal, round, and reactive to light.   Cardiovascular:      Rate and Rhythm: Normal rate and regular rhythm.      Pulses: Normal pulses.      Heart sounds: Normal heart sounds. No murmur heard.  Pulmonary:      Effort: Pulmonary effort is normal. No respiratory distress.      Breath sounds: Normal breath sounds.   Chest:       Abdominal:      Palpations: Abdomen is soft.   Musculoskeletal:  "        General: Normal range of motion.      Cervical back: Normal range of motion and neck supple.   Skin:     General: Skin is warm and dry.      Capillary Refill: Capillary refill takes less than 2 seconds.   Neurological:      General: No focal deficit present.      Mental Status: She is alert and oriented to person, place, and time.   Psychiatric:         Mood and Affect: Mood normal.         Behavior: Behavior normal.         Thought Content: Thought content normal.         Judgment: Judgment normal.         Vitals:    08/22/24 1109   BP: 152/68   Pulse: 68   Resp: 16   Temp: 97 °F (36.1 °C)   TempSrc: Temporal   SpO2: 100%   Weight: 65.5 kg (144 lb 6.4 oz)   Height: 157.5 cm (62\")   PainSc: 0-No pain       Wt Readings from Last 3 Encounters:   08/22/24 65.5 kg (144 lb 6.4 oz)   04/12/24 62 kg (136 lb 11 oz)   03/14/24 62.7 kg (138 lb 3.7 oz)        BMI is within normal parameters. No other follow-up for BMI required.       ECOG score: 0         ECOG: (0) Fully Active - Able to Carry On All Pre-disease Performance Without Restriction  Fall Risk Assessment was completed, and patient is at low risk for falls.  PHQ-9 Total Score:         The patient is  experiencing fatigue. Fatigue score: 1    PT/OT Functional Screening: PT fx screen : No needs identified  Speech Functional Screening:   Rehab to be ordered:         Result Review :  The following data was reviewed by: JAREN Jaramillo on 08/22/2024:  Lab Results   Component Value Date    HGB 12.2 04/13/2024    HCT 36.4 04/13/2024    MCV 89.4 04/13/2024     04/13/2024    WBC 8.80 04/13/2024    NEUTROABS 5.71 04/13/2024    LYMPHSABS 2.09 04/13/2024    MONOSABS 0.69 04/13/2024    EOSABS 0.25 04/13/2024    BASOSABS 0.04 04/13/2024     Lab Results   Component Value Date    GLUCOSE 113 (H) 04/13/2024    BUN 10 04/13/2024    CREATININE 0.71 04/13/2024     04/13/2024    K 3.9 04/13/2024     (H) 04/13/2024    CO2 21.6 (L) 04/13/2024    " "CALCIUM 8.7 04/13/2024    PROTEINTOT 6.2 04/13/2024    ALBUMIN 3.6 04/13/2024    BILITOT 0.3 04/13/2024    ALKPHOS 62 04/13/2024    AST 20 04/13/2024    ALT 26 04/13/2024     Lab Results   Component Value Date    FERRITIN 103.90 04/13/2024    [id number] 601 04/13/2024    FOLATE 13.10 04/13/2024     Lab Results   Component Value Date    IRON 102 04/13/2024    LABIRON 29 04/13/2024    TRANSFERRIN 239 04/13/2024    TIBC 356 04/13/2024     Lab Results   Component Value Date    FERRITIN 103.90 04/13/2024    [id number] 601 04/13/2024    FOLATE 13.10 04/13/2024     No results found for: \"PSA\", \"CEA\", \"AFP\", \"\", \"\"    Mammo Diagnostic Digital Tomosynthesis Left With CAD    Result Date: 8/21/2024   IMPRESSION: Probably benign left mammogram.  Patient should've a bilateral diagnostic mammogram in 2/25, which will be 1 year from the previous study.    TISSUE DENSITY: There are scattered areas of fibroglandular density.  BI-RADS ASSESSMENT: BI-RADS 3. Probably Benign.  Short interval followup recommended.   Note: It has been reported that there is approximately a 15% false negative in mammography. Therefore, management of a palpable abnormality should not be deferred because of a negative mammogram.    Electronically Signed By-VANDANA HSIEH MD On:8/21/2024 12:31 PM      XR Hand 3+ View Left    Result Date: 8/13/2024  Impression: Degenerative arthrosis and osteopenia. Electronically Signed: Maulik Valencia MD  8/13/2024 1:02 PM EDT  Workstation ID: [id number]    XR Hand 3+ View Right    Result Date: 8/13/2024  Impression: Degenerative arthrosis and osteopenia. Electronically Signed: Maulik Valencia MD  8/13/2024 1:02 PM EDT  Workstation ID: [id number]           Assessment and Plan:  Diagnoses and all orders for this visit:    1. Abnormal mammogram of left breast (Primary)  -     Mammo Diagnostic Digital Tomosynthesis Left With CAD; Future    2. History of right breast cancer  -     Mammo Diagnostic Digital Tomosynthesis Left " With CAD; Future    Right sided breast cancer in 1996 and status post chemo, XRT and right mastectomy and completed 5 years of endocrine therapy.     Follows yearly for left breast mammogram. In February of 2022, tiny nodule LN in the 2:00 position. Repeat mammogram diagnostic done on 8/21/2024 and is stable but still present. Recommendation is for short term follow up in 6 months by radiologist.     Left sided diagnostic mammogram ordered for February. Follow up with MD 1-2 days after mammogram completed.     I spent 25 minutes caring for Alina on this date of service. This time includes time spent by me in the following activities:preparing for the visit, reviewing tests, obtaining and/or reviewing a separately obtained history, performing a medically appropriate examination and/or evaluation , counseling and educating the patient/family/caregiver, ordering medications, tests, or procedures, referring and communicating with other health care professionals , documenting information in the medical record, and independently interpreting results and communicating that information with the patient/family/caregiver    Patient Follow Up: 6 months MD   Patient was given instructions and counseling regarding her condition or for health maintenance advice. Please see specific information pulled into the AVS if appropriate.     Bella Long, APRN    8/22/2024    .tob

## 2024-09-11 NOTE — PROGRESS NOTES
Chief Complaint        fatty liver (Referred by Claudia Oliva PA-C, for fatty liver, patient does not have any symptoms or issues. )    History of Present Illness      Alina Velasquez is a 79 y.o. female who presents to [facility] as a new patient with a history of fatty liver and elevated liver enzymes.  Patient reports back in February she was told that her liver enzymes were elevated.  She had a FibroSure completed which displayed F 1-2 on fibrosis and was told that she had fatty liver.  She continues with mildly elevated liver enzymes.  Patient reports that she has changed her diet and has lost about 7 pounds.  Patient denies family history of liver disease or liver cancer.  Patient denies alcohol use.  Patient denies history of excessive Tylenol use, IV drug abuse, blood transfusions, exposure to hepatitis.  Patient denies abdominal swelling, leg swelling, jaundice, fever, nausea, vomiting, weight loss, night sweats, melena, hematochezia, hematemesis.    Most recent labs- 4/13/24    CT Abdomen/Pelvis- 2/27/24  1. Subpleural mass with a few coarse calcifications in the lateral aspect of the right pulmonary   apex measuring 2.4 x 1.2 cm.  This is similar to the previous CT of the cervical spine from a most   1 year ago.  This favors an area of chronic parenchymal scarring.   2. There is mild bronchiectasis in the right upper lobe and the medial segment right middle lobe   with intervening areas of scarring.   3. Status post right mastectomy.  There is scarring and surgical clips in the right axilla.   4. Additional findings as noted above.     Colonoscopy: Review of the patient's most recent colonoscopy performed by Dr. Almonte on 2/8/21 Diverticulosis sigmoid colon grade 1 internal hemorrhoids and a polyp in the hepatic flexure repeat 5 years.    Results       Result Review :   The following data was reviewed by: JAREN Bowden on 09/12/2024     CMP          3/29/2024     12:21 4/12/2024    11:32 4/12/2024    21:27 4/13/2024    05:39   CMP   Glucose  170  98  113    BUN  16  11  10    Creatinine 0.90  0.84  0.71  0.71    EGFR 65.2  70.8  86.6  86.6    Sodium  140  143  140    Potassium  3.7  3.8  3.9    Chloride  104  109  109    Calcium  9.9  8.7  8.7    Total Protein  7.5  6.6  6.2    Albumin  4.3  3.9  3.6    Globulin  3.2  2.7  2.6    Total Bilirubin  0.5  0.3  0.3    Alkaline Phosphatase  77  66  62    AST (SGOT)  27  24  20    ALT (SGPT)  32  29  26    Albumin/Globulin Ratio  1.3  1.4  1.4    BUN/Creatinine Ratio  19.0  15.5  14.1    Anion Gap  15.5  11.3  9.4      CBC          4/12/2024    11:32 4/12/2024    21:27 4/13/2024    05:39   CBC   WBC 8.37  9.49  8.80    RBC 4.85  4.36  4.07    Hemoglobin 14.5  13.0  12.2    Hematocrit 42.0  38.8  36.4    MCV 86.6  89.0  89.4    MCH 29.9  29.8  30.0    MCHC 34.5  33.5  33.5    RDW 12.9  12.9  13.1    Platelets 309  266  258        Iron Profile   Iron   Date Value Ref Range Status   04/13/2024 102 37 - 145 mcg/dL Final     TIBC   Date Value Ref Range Status   04/13/2024 356 298 - 536 mcg/dL Final     Iron Saturation (TSAT)   Date Value Ref Range Status   04/13/2024 29 20 - 50 % Final     Transferrin   Date Value Ref Range Status   04/13/2024 239 200 - 360 mg/dL Final     Ferritin   Ferritin   Date Value Ref Range Status   04/13/2024 103.90 13.00 - 150.00 ng/mL Final            Past Medical History       Past Medical History:   Diagnosis Date    Bell's palsy     2008 RIGHT SIDE OF FACE    Bloating 06/17/2016    Essential hypertension 03/17/2017    Generalized anxiety disorder 03/04/2019    Hematuria 10/30/2017    2024    Herpes simplex 04/16/2018    History of right breast cancer 02/08/2022    Hyperlipemia 06/17/2016    Neoplasm of breast, female, malignant 1996    right breast NO CP OR NS R ARM    Osteoarthritis, multiple sites 1994    throughout whole body    Polyarthritis of multiple sites 07/13/2017    PONV (postoperative nausea  and vomiting)     Subclinical hypothyroidism 06/17/2016    Tinnitus 07/13/2017    Vertigo 07/13/2017       Past Surgical History:   Procedure Laterality Date    ANKLE SURGERY Left 1995    Lt ankle d/t broken ankle injury    BREAST SURGERY Right     REVISION OF INCISION/SCAR    CATARACT EXTRACTION Bilateral 2008    with lens implant    COLONOSCOPY      2013- 2016- [id number]    CYSTOSCOPY RETROGRADE PYELOGRAM Bilateral 3/14/2024    Procedure: CYSTOSCOPY RETROGRADE PYELOGRAM, CYSTOSCOPY BLADDER BIOPSY AND FULGURATION;  Surgeon: Luciana Dukes MD;  Location: [facility] MAIN OR;  Service: Urology;  Laterality: Bilateral;    ENDOSCOPY      2013- 2020    HYSTERECTOMY      partial     KNEE SURGERY Right 2012    Knee replacement- Dr. Gupta-Adenike    LAPAROSCOPIC CHOLECYSTECTOMY  2015    MASTECTOMY Right 1996    Dr. Trent Sun         Current Outpatient Medications:     amLODIPine (NORVASC) 5 MG tablet, Take 1 tablet by mouth Daily With Lunch., Disp: , Rfl:     atenolol (TENORMIN) 50 MG tablet, Take 1 tablet by mouth Every Night., Disp: , Rfl:     Ergocalciferol (Vitamin D2) 50 MCG (2000 UT) tablet, Take 1 tablet by mouth Daily., Disp: , Rfl:     Loratadine (CLARITIN PO), Take 1 tablet by mouth Daily., Disp: , Rfl:     omeprazole (priLOSEC) 20 MG capsule, Take 1 capsule by mouth Every Other Day., Disp: , Rfl:     pravastatin (PRAVACHOL) 20 MG tablet, Take 1 tablet by mouth Every Night., Disp: , Rfl:      Allergies   Allergen Reactions    Contrast Dye (Echo Or Unknown Ct/Mr) Shortness Of Breath       Family History   Problem Relation Age of Onset    Hypertension Mother     Arthritis Mother     Osteoporosis Mother     Arthritis Sister     Osteoporosis Sister     Heart disease Brother     Hypertension Brother     Diabetes Brother     Malig Hyperthermia Neg Hx     Colon cancer Neg Hx         Social History     Social History Narrative    Not on file       Objective       Objective     Vital Signs:   /62 (BP Location: Left  "arm, Patient Position: Sitting, Cuff Size: Adult)   Pulse 58   Ht 157.5 cm (62\")   Wt 62.8 kg (138 lb 6.4 oz)   SpO2 100%   BMI 25.31 kg/m²     Body mass index is 25.31 kg/m².    Physical Exam  Constitutional:       Appearance: Normal appearance.   Pulmonary:      Effort: Pulmonary effort is normal.   Neurological:      General: No focal deficit present.      Mental Status: She is alert and oriented to person, place, and time.   Psychiatric:         Mood and Affect: Mood normal.         Behavior: Behavior normal.              Assessment & Plan          Assessment and Plan    Diagnoses and all orders for this visit:    1. Elevated liver enzymes (Primary)  -     US Liver; Future  -     Comprehensive Metabolic Panel; Future    2. Hepatic steatosis        79-year-old female presenting to the office today as a new patient with a history of fatty liver and elevated liver enzymes.  Patient reports back in February she was told that her liver enzymes were elevated.  She had a FibroSure completed which displayed F 1-2 on fibrosis and was told that she had fatty liver.  She continues with mildly elevated liver enzymes.  I have ordered a right upper quadrant ultrasound and repeat liver enzymes at this time.  We have discussed healthy lifestyle including the Mediterranean diet.  Patient will continue with 1 to 2 cups of black coffee per day.  Will follow-up in 6 months.  Patient agreeable to this plan will call with any questions or concerns.        Follow Up       Follow Up   Return in about 6 months (around 3/12/2025).  Patient was given instructions and counseling regarding her condition or for health maintenance advice. Please see specific information pulled into the AVS if appropriate.     "

## 2024-09-12 ENCOUNTER — OFFICE VISIT (OUTPATIENT)
Dept: GASTROENTEROLOGY | Facility: CLINIC | Age: 79
End: 2024-09-12
Payer: MEDICARE

## 2024-09-12 VITALS
SYSTOLIC BLOOD PRESSURE: 159 MMHG | BODY MASS INDEX: 25.47 KG/M2 | HEIGHT: 62 IN | HEART RATE: 58 BPM | DIASTOLIC BLOOD PRESSURE: 62 MMHG | OXYGEN SATURATION: 100 % | WEIGHT: 138.4 LBS

## 2024-09-12 DIAGNOSIS — K76.0 HEPATIC STEATOSIS: ICD-10-CM

## 2024-09-12 DIAGNOSIS — R74.8 ELEVATED LIVER ENZYMES: Primary | ICD-10-CM

## 2024-09-13 ENCOUNTER — PATIENT ROUNDING (BHMG ONLY) (OUTPATIENT)
Dept: GASTROENTEROLOGY | Facility: CLINIC | Age: 79
End: 2024-09-13
Payer: MEDICARE

## 2024-09-13 NOTE — PROGRESS NOTES
"9/13/2024      Hello, may I speak with Alina Velasquez     My name is Leydi. I am calling from [facility] [place]. I show that you had a recent visit with JAREN Bowden.    Before we get started may I verify your date of birth? 1945    I am calling to officially welcome you to our practice and ask about your recent visit. Is this a good time to talk?  Yes     Tell me about your visit with us. What things went well? \"My visit with Maci went really well, she answered all of my questions, she was very thorough, everyone in the office was really nice\"    We strive to ensure that we protect your safety and privacy. Is there anything we could have done to improve this during your visit?    No     We're always looking for ways to make our patients' experiences even better. Do you have recommendations on ways we may improve?   No     Overall were you satisfied with your first visit to our practice?  Yes, I had a good visit with Maci     I appreciate you taking the time to speak with me today. Is there anything else I can do for you? No     I am glad to hear that you had a very good visit and I appreciate you taking the time to provide feedback on this call. We would greatly appreciate you filling out a survey if you receive one in the mail, email or text. This is a great opportunity to provide any additional feedback that you may think of after this call as well.       Thank you, and have a great day.   "

## 2024-10-17 ENCOUNTER — HOSPITAL ENCOUNTER (OUTPATIENT)
Dept: ULTRASOUND IMAGING | Facility: HOSPITAL | Age: 79
Discharge: HOME | End: 2024-10-17
Admitting: NURSE PRACTITIONER
Payer: MEDICARE

## 2024-10-17 DIAGNOSIS — R74.8 ELEVATED LIVER ENZYMES: ICD-10-CM

## 2024-10-17 PROCEDURE — 76705 ECHO EXAM OF ABDOMEN: CPT

## 2024-10-18 ENCOUNTER — TELEPHONE (OUTPATIENT)
Dept: GASTROENTEROLOGY | Facility: CLINIC | Age: 79
End: 2024-10-18
Payer: MEDICARE

## 2024-10-18 NOTE — TELEPHONE ENCOUNTER
----- Message from Maci Wilkins sent at 10/18/2024  8:54 AM EDT -----  Hepatic steatosis which is fatty liver noted on right upper quadrant ultrasound.  Cholecystectomy.

## 2024-12-26 ENCOUNTER — TELEPHONE (OUTPATIENT)
Dept: GASTROENTEROLOGY | Facility: CLINIC | Age: 79
End: 2024-12-26
Payer: MEDICARE

## 2024-12-26 NOTE — TELEPHONE ENCOUNTER
Spoke with the patient, informed her that she has an over due lab order. Patient stated she will have it completed.

## 2024-12-31 ENCOUNTER — TELEPHONE (OUTPATIENT)
Dept: GASTROENTEROLOGY | Facility: CLINIC | Age: 79
End: 2024-12-31
Payer: MEDICARE

## 2024-12-31 NOTE — TELEPHONE ENCOUNTER
Spoke with the patient and informed her of the results from Maci EASTMAN. Informed the patient to maintain her f/u with Maci in March. Patient expressed clear understanding.

## 2024-12-31 NOTE — TELEPHONE ENCOUNTER
Maci Wilkins APRN Fryrear, Rachel, MA  Alpha gal negative.  CMP normal with normal glucose kidney function electrolytes and liver enzymes.  Iron levels normal.  B12 folate normal.  Hemoglobin normal with no anemia.  Food allergy panel normal with no food allergies noted.  FibroSure displays a fibrosis stage of 1-2 which is mild to moderate.  Steatosis score was mildly elevated displaying fatty liver.  Continue with healthy lifestyle and weight loss.

## 2025-01-29 ENCOUNTER — OFFICE VISIT (OUTPATIENT)
Dept: UROLOGY | Age: 80
End: 2025-01-29
Payer: MEDICARE

## 2025-01-29 VITALS — WEIGHT: 138 LBS | HEIGHT: 62 IN | BODY MASS INDEX: 25.4 KG/M2

## 2025-01-29 DIAGNOSIS — R31.9 HEMATURIA, UNSPECIFIED TYPE: Primary | ICD-10-CM

## 2025-01-29 LAB
BILIRUB BLD-MCNC: NEGATIVE MG/DL
CLARITY, POC: CLEAR
COLOR UR: YELLOW
EXPIRATION DATE: ABNORMAL
GLUCOSE UR STRIP-MCNC: NEGATIVE MG/DL
KETONES UR QL: NEGATIVE
LEUKOCYTE EST, POC: NEGATIVE
Lab: ABNORMAL
NITRITE UR-MCNC: NEGATIVE MG/ML
PH UR: 5.5 [PH] (ref 5–8)
PROT UR STRIP-MCNC: NEGATIVE MG/DL
RBC # UR STRIP: ABNORMAL /UL
SP GR UR: 1 (ref 1–1.03)
UROBILINOGEN UR QL: ABNORMAL

## 2025-01-29 NOTE — PROGRESS NOTES
"Chief Complaint  Hematuria, unspecified type    Subjective          Alina Velasquez presents to [facility] UROLOG[facility]    History of Present Illness  Patient has no new complaints.  She denies any gross hematuria over the last year.  No issues with infections.  She has trace blood in her urine today on urinalysis.      Objective   Vital Signs:   Ht 157.5 cm (62\")   Wt 62.6 kg (138 lb)   BMI 25.24 kg/m²       Physical Exam  Vitals and nursing note reviewed.   Constitutional:       Appearance: Normal appearance. She is well-developed.   Pulmonary:      Effort: Pulmonary effort is normal.      Breath sounds: Normal air entry.   Neurological:      Mental Status: She is alert and oriented to person, place, and time.      Motor: Motor function is intact.   Psychiatric:         Mood and Affect: Mood normal.         Behavior: Behavior normal.          Result Review :   The following data was reviewed by: Luciana Dukes MD on 01/29/2025:    Results for orders placed or performed in visit on 01/29/25   POC Urinalysis Dipstick, Automated    Collection Time: 01/29/25  1:00 PM    Specimen: Urine   Result Value Ref Range    Color Yellow Yellow, Straw, Dark Yellow, Aimee    Clarity, UA Clear Clear    Specific Gravity  1.005 1.005 - 1.030    pH, Urine 5.5 5.0 - 8.0    Leukocytes Negative Negative    Nitrite, UA Negative Negative    Protein, POC Negative Negative mg/dL    Glucose, UA Negative Negative mg/dL    Ketones, UA Negative Negative    Urobilinogen, UA 0.2 E.U./dL Normal, 0.2 E.U./dL    Bilirubin Negative Negative    Blood, UA Trace (A) Negative    Lot Number [id number]     Expiration Date 102,025             Assessment and Plan    Diagnoses and all orders for this visit:    1. Hematuria, unspecified type (Primary)  -     POC Urinalysis Dipstick, Automated    Will see her back in 1 year or sooner if needed.  She will call if she has any gross hematuria or any other issues.        Follow Up       Return in " about 1 year (around 1/29/2026) for Annual Visit.  Patient was given instructions and counseling regarding her condition or for health maintenance advice. Please see specific information pulled into the AVS if appropriate.

## 2025-02-20 ENCOUNTER — HOSPITAL ENCOUNTER (OUTPATIENT)
Dept: MAMMOGRAPHY | Facility: HOSPITAL | Age: 80
Discharge: HOME | End: 2025-02-20
Admitting: NURSE PRACTITIONER
Payer: MEDICARE

## 2025-02-20 ENCOUNTER — HOSPITAL ENCOUNTER (OUTPATIENT)
Dept: ULTRASOUND IMAGING | Facility: HOSPITAL | Age: 80
End: 2025-02-20
Payer: MEDICARE

## 2025-02-20 DIAGNOSIS — R92.8 ABNORMAL MAMMOGRAM OF LEFT BREAST: ICD-10-CM

## 2025-02-20 DIAGNOSIS — Z85.3 HISTORY OF RIGHT BREAST CANCER: ICD-10-CM

## 2025-02-20 PROCEDURE — G0279 TOMOSYNTHESIS, MAMMO: HCPCS

## 2025-02-20 PROCEDURE — 77065 DX MAMMO INCL CAD UNI: CPT

## 2025-02-26 ENCOUNTER — OFFICE VISIT (OUTPATIENT)
Dept: ONCOLOGY | Facility: HOSPITAL | Age: 80
End: 2025-02-26
Payer: MEDICARE

## 2025-02-26 VITALS
DIASTOLIC BLOOD PRESSURE: 73 MMHG | RESPIRATION RATE: 20 BRPM | TEMPERATURE: 98.8 F | SYSTOLIC BLOOD PRESSURE: 159 MMHG | BODY MASS INDEX: 25.36 KG/M2 | OXYGEN SATURATION: 95 % | WEIGHT: 138.67 LBS | HEART RATE: 76 BPM

## 2025-02-26 DIAGNOSIS — Z12.31 ENCOUNTER FOR SCREENING MAMMOGRAM FOR BREAST CANCER: ICD-10-CM

## 2025-02-26 DIAGNOSIS — Z85.3 HISTORY OF RIGHT BREAST CANCER: Primary | ICD-10-CM

## 2025-02-26 PROCEDURE — G0463 HOSPITAL OUTPT CLINIC VISIT: HCPCS | Performed by: INTERNAL MEDICINE

## 2025-02-26 NOTE — ASSESSMENT & PLAN NOTE
Patient right breast cancer 1996.  She returns for yearly surveillance.  Prior left mammogram showed a questionable nodule in the left breast and radiology recommended 6-month interval diagnostic mammogram which was reviewed today.  No evidence of the previously noted lesion identified.  Her examination is benign.  Continue with the other surveillance and mammogram prior.

## 2025-02-26 NOTE — PROGRESS NOTES
Chief Complaint  Follow-up    Claudia Patel PA-C Morrow, Beth E, PA-C    Subjective          Alina Velasquez presents to [facility] for yearly follow-up.  She is status post right mastectomy and breast cancer treatment in 1996.  She states she has been feeling well.  Last year she had a questionable area noted on left mammogram and radiology recommended 6-month follow-up.  She had that study recently and presents today for review.  She states she has been feeling well.  She denies new masses, adenopathy, unusual aches or pains.  She is able to perform all of her ADLs.  She has no specific complaints today.    Oncology/Hematology History    No history exists.         Current Outpatient Medications on File Prior to Visit   Medication Sig Dispense Refill    amLODIPine (NORVASC) 5 MG tablet Take 1 tablet by mouth Daily With Lunch.      atenolol (TENORMIN) 50 MG tablet Take 1 tablet by mouth Every Night.      Ergocalciferol (Vitamin D2) 50 MCG (2000 UT) tablet Take 1 tablet by mouth Daily.      Loratadine (CLARITIN PO) Take 1 tablet by mouth Daily.      omeprazole (priLOSEC) 20 MG capsule Take 1 capsule by mouth Every Other Day.      pravastatin (PRAVACHOL) 20 MG tablet Take 1 tablet by mouth Every Night.       No current facility-administered medications on file prior to visit.       Allergies   Allergen Reactions    Contrast Dye (Echo Or Unknown Ct/Mr) Shortness Of Breath     Past Medical History:   Diagnosis Date    Bell's palsy     2008 RIGHT SIDE OF FACE    Bloating 06/17/2016    Essential hypertension 03/17/2017    Generalized anxiety disorder 03/04/2019    Hematuria 10/30/2017    2024    Herpes simplex 04/16/2018    History of right breast cancer 02/08/2022    Hyperlipemia 06/17/2016    Neoplasm of breast, female, malignant 1996    right breast NO CP OR NS R ARM    Osteoarthritis, multiple sites 1994    throughout whole body    Polyarthritis of multiple sites  07/13/2017    PONV (postoperative nausea and vomiting)     Subclinical hypothyroidism 06/17/2016    Tinnitus 07/13/2017    Vertigo 07/13/2017     Past Surgical History:   Procedure Laterality Date    ANKLE SURGERY Left 1995    Lt ankle d/t broken ankle injury    BREAST SURGERY Right     REVISION OF INCISION/SCAR    CATARACT EXTRACTION Bilateral 2008    with lens implant    COLONOSCOPY      2013- 2016- [id number]    CYSTOSCOPY RETROGRADE PYELOGRAM Bilateral 3/14/2024    Procedure: CYSTOSCOPY RETROGRADE PYELOGRAM, CYSTOSCOPY BLADDER BIOPSY AND FULGURATION;  Surgeon: Luciana Dukes MD;  Location: [facility] MAIN OR;  Service: Urology;  Laterality: Bilateral;    ENDOSCOPY      2013- 2020    HYSTERECTOMY      partial     KNEE SURGERY Right 2012    Knee replacement- Dr. Gupta-Adenike    LAPAROSCOPIC CHOLECYSTECTOMY  2015    MASTECTOMY Right 1996    Dr. Trent Sun     Social History     Socioeconomic History    Marital status:    Tobacco Use    Smoking status: Never     Passive exposure: Past    Smokeless tobacco: Never   Vaping Use    Vaping status: Never Used   Substance and Sexual Activity    Alcohol use: Never     Comment: does not drink 3/4/2019- 1/29/2019- 1/21/2019- 10/16/2018    Drug use: Never    Sexual activity: Defer     Family History   Problem Relation Age of Onset    Hypertension Mother     Arthritis Mother     Osteoporosis Mother     Arthritis Sister     Osteoporosis Sister     Heart disease Brother     Hypertension Brother     Diabetes Brother     Malig Hyperthermia Neg Hx     Colon cancer Neg Hx        Objective   Physical Exam  Vitals reviewed. Exam conducted with a chaperone present.   Cardiovascular:      Rate and Rhythm: Normal rate and regular rhythm.      Heart sounds: Normal heart sounds. No murmur heard.     No gallop.   Pulmonary:      Effort: Pulmonary effort is normal.      Breath sounds: Normal breath sounds.   Chest:   Breasts:     Right: Absent.      Left: Normal.       Abdominal:       "General: Bowel sounds are normal.   Lymphadenopathy:      Cervical: No cervical adenopathy.      Upper Body:      Right upper body: No supraclavicular or axillary adenopathy.      Left upper body: No supraclavicular or axillary adenopathy.   Psychiatric:         Mood and Affect: Mood normal.         Behavior: Behavior normal.         Vitals:    02/26/25 1111   BP: 159/73   Pulse: 76   Resp: 20   Temp: 98.8 °F (37.1 °C)   TempSrc: Temporal   SpO2: 95%   Weight: 62.9 kg (138 lb 10.7 oz)   PainSc: 0-No pain     ECOG score: 0         PHQ-9 Total Score:                    Result Review :   The following data was reviewed by: Goran Yepez MD on 02/26/2025:  Lab Results   Component Value Date    HGB 12.2 04/13/2024    HCT 36.4 04/13/2024    MCV 89.4 04/13/2024     04/13/2024    WBC 8.80 04/13/2024    NEUTROABS 5.71 04/13/2024    LYMPHSABS 2.09 04/13/2024    MONOSABS 0.69 04/13/2024    EOSABS 0.25 04/13/2024    BASOSABS 0.04 04/13/2024     Lab Results   Component Value Date    GLUCOSE 113 (H) 04/13/2024    BUN 10 04/13/2024    CREATININE 0.71 04/13/2024     04/13/2024    K 3.9 04/13/2024     (H) 04/13/2024    CO2 21.6 (L) 04/13/2024    CALCIUM 8.7 04/13/2024    PROTEINTOT 6.2 04/13/2024    ALBUMIN 3.6 04/13/2024    BILITOT 0.3 04/13/2024    ALKPHOS 62 04/13/2024    AST 20 04/13/2024    ALT 26 04/13/2024     Lab Results   Component Value Date    MG 1.9 04/12/2024    FREET4 1.1 10/06/2020    TSH 2.060 04/12/2024     Lab Results   Component Value Date    IRON 102 04/13/2024    LABIRON 29 04/13/2024    TRANSFERRIN 239 04/13/2024    TIBC 356 04/13/2024     Lab Results   Component Value Date    FERRITIN 103.90 04/13/2024    DZLASZCV07 601 04/13/2024    FOLATE 13.10 04/13/2024     No results found for: \"PSA\", \"CEA\", \"AFP\", \"\", \"\"    Data reviewed : Radiologic studies left diagnostic mammogram reviewed       Assessment and Plan    Diagnoses and all orders for this visit:    1. History of right " breast cancer (Primary)  Assessment & Plan:  Patient right breast cancer 1996.  She returns for yearly surveillance.  Prior left mammogram showed a questionable nodule in the left breast and radiology recommended 6-month interval diagnostic mammogram which was reviewed today.  No evidence of the previously noted lesion identified.  Her examination is benign.  Continue with the other surveillance and mammogram prior.      2. Encounter for screening mammogram for breast cancer  -     Mammo Screening Modified With Tomosynthesis Left With CAD; Future            Patient Follow Up: 1 year with mammogram prior  Patient was given instructions and counseling regarding her condition or for health maintenance advice. Please see specific information pulled into the AVS if appropriate.     Goran Yepez MD    2/26/2025

## 2025-03-11 NOTE — PROGRESS NOTES
Chief Complaint   Follow-up (Patient is following up for elevated liver enzymes, she has changed her diet and has lost weight. She doesn't feel as hungry. )    Patient or patient representative verbalized consent for the use of Ambient Listening during the visit with  JAREN Bowden for chart documentation. 3/13/2025  12:37 EDT      History of Present Illness       Alina Velasquez is a 79 y.o. female who presents to [facility] for follow-up   History of Present Illness  The patient is an 79-year-old female presenting to the office today for follow-up with a history of fatty liver and elevated liver enzymes.    She has changed her diet and lost 10 pounds since her last visit in September, with a current weight of 128 pounds. She reports a general sense of well-being and has made dietary modifications, including a reduction in carbohydrate intake. Her primary form of exercise is walking. She has not undergone any recent blood work. She expresses concern about her elevated liver enzyme levels, which were first identified in February 2024 and have remained high since then.    She also mentions that she has been prescribed clonazepam 0.5 mg, but she only takes half of the prescribed dose due to fear of potential side effects. She is curious about the potential impact of her sleep issues on her weight.    She has discontinued the use of Prilosec, which she previously took regularly, and now only experiences occasional reflux symptoms, typically triggered by certain foods like pizza. She manages these symptoms with Gaviscon as needed.    She saw her oncologist and had a mammogram in February 2024, which showed something suspicious, so she had to repeat it and have an ultrasound. It turned out to be a lymph node. She repeated it again in September 2024, and it was still a lymph node. When she had it done in February 2025, everything was okay. She saw Dr. Yepez the next week, and he  said everything is okay.    Supplemental Information  She is currently on an antibiotic for a sinus infection. She is taking pravastatin and half of a timolol at bedtime. She is taking vitamin D 50,000 units.    MEDICATIONS  Current: pravastatin, timolol, vitamin D, Gaviscon  Past: Prilosec    Most recent labs- 12/18/24    US Liver- 10/17/24  1. Hepatic steatosis.  2. Cholecystectomy without biliary obstruction  3. Right renal cortical thinning. No hydronephrosis.    Last office visit- 9/12/24  Alina Velasquez is a 79 y.o. female who presents to [facility] as a new patient with a history of fatty liver and elevated liver enzymes.  Patient reports back in February she was told that her liver enzymes were elevated.  She had a FibroSure completed which displayed F 1-2 on fibrosis and was told that she had fatty liver.  She continues with mildly elevated liver enzymes.  Patient reports that she has changed her diet and has lost about 7 pounds.  Patient denies family history of liver disease or liver cancer.  Patient denies alcohol use.  Patient denies history of excessive Tylenol use, IV drug abuse, blood transfusions, exposure to hepatitis.  Patient denies abdominal swelling, leg swelling, jaundice, fever, nausea, vomiting, weight loss, night sweats, melena, hematochezia, hematemesis.     Most recent labs- 4/13/24     CT Abdomen/Pelvis- 2/27/24  1. Subpleural mass with a few coarse calcifications in the lateral aspect of the right pulmonary   apex measuring 2.4 x 1.2 cm.  This is similar to the previous CT of the cervical spine from a most   1 year ago.  This favors an area of chronic parenchymal scarring.   2. There is mild bronchiectasis in the right upper lobe and the medial segment right middle lobe   with intervening areas of scarring.   3. Status post right mastectomy.  There is scarring and surgical clips in the right axilla.   4. Additional findings as noted above.       Colonoscopy: Review of the patient's most recent colonoscopy performed by Dr. Almonte on 2/8/21 Diverticulosis sigmoid colon grade 1 internal hemorrhoids and a polyp in the hepatic flexure repeat 5 years.        Results       Result Review :   The following data was reviewed by: JAREN Bowden on 03/13/2025:    CMP          3/29/2024    12:21 4/12/2024    11:32 4/12/2024    21:27 4/13/2024    05:39   CMP   Glucose  170  98  113    BUN  16  11  10    Creatinine 0.90  0.84  0.71  0.71    EGFR 65.2  70.8  86.6  86.6    Sodium  140  143  140    Potassium  3.7  3.8  3.9    Chloride  104  109  109    Calcium  9.9  8.7  8.7    Total Protein  7.5  6.6  6.2    Albumin  4.3  3.9  3.6    Globulin  3.2  2.7  2.6    Total Bilirubin  0.5  0.3  0.3    Alkaline Phosphatase  77  66  62    AST (SGOT)  27  24  20    ALT (SGPT)  32  29  26    Albumin/Globulin Ratio  1.3  1.4  1.4    BUN/Creatinine Ratio  19.0  15.5  14.1    Anion Gap  15.5  11.3  9.4      CBC          4/12/2024    11:32 4/12/2024    21:27 4/13/2024    05:39   CBC   WBC 8.37  9.49  8.80    RBC 4.85  4.36  4.07    Hemoglobin 14.5  13.0  12.2    Hematocrit 42.0  38.8  36.4    MCV 86.6  89.0  89.4    MCH 29.9  29.8  30.0    MCHC 34.5  33.5  33.5    RDW 12.9  12.9  13.1    Platelets 309  266  258        Liver Workup   Ferritin   Date Value Ref Range Status   04/13/2024 103.90 13.00 - 150.00 ng/mL Final     Immunofixation Result, Serum   Date Value Ref Range Status   04/13/2024 Comment  Final     Comment:     No monoclonality detected.     IgG   Date Value Ref Range Status   04/13/2024 852 586 - 1602 mg/dL Final     IgA   Date Value Ref Range Status   04/13/2024 351 64 - 422 mg/dL Final     IgM   Date Value Ref Range Status   04/13/2024 92 26 - 217 mg/dL Final     Iron   Date Value Ref Range Status   04/13/2024 102 37 - 145 mcg/dL Final     TIBC   Date Value Ref Range Status   04/13/2024 356 298 - 536 mcg/dL Final     Iron Saturation (TSAT)   Date Value Ref Range Status    04/13/2024 29 20 - 50 % Final     Transferrin   Date Value Ref Range Status   04/13/2024 239 200 - 360 mg/dL Final     Protime   Date Value Ref Range Status   04/13/2024 14.3 11.8 - 14.9 Seconds Final     INR   Date Value Ref Range Status   04/13/2024 1.09 0.86 - 1.15 Final               Results  Imaging  Mammogram showed a lymph node, which was not inflamed in the follow-up mammogram.      Past Medical History       Past Medical History:   Diagnosis Date    Bell's palsy     2008 RIGHT SIDE OF FACE    Bloating 06/17/2016    Essential hypertension 03/17/2017    Generalized anxiety disorder 03/04/2019    Hematuria 10/30/2017    2024    Herpes simplex 04/16/2018    Hip arthrosis 2022    History of right breast cancer 02/08/2022    Hyperlipemia 06/17/2016    Neck strain 2022    Neoplasm of breast, female, malignant 1996    right breast NO CP OR NS R ARM    Osteoarthritis, multiple sites 1994    throughout whole body    Periarthritis of shoulder 2022    Polyarthritis of multiple sites 07/13/2017    PONV (postoperative nausea and vomiting)     Rotator cuff syndrome 2018    Subclinical hypothyroidism 06/17/2016    Tinnitus 07/13/2017    Vertigo 07/13/2017       Past Surgical History:   Procedure Laterality Date    ANKLE SURGERY Left 1995    Lt ankle d/t broken ankle injury    BREAST SURGERY Right     REVISION OF INCISION/SCAR    CATARACT EXTRACTION Bilateral 2008    with lens implant    COLONOSCOPY      2013- 2016- [id number]    CYSTOSCOPY RETROGRADE PYELOGRAM Bilateral 03/14/2024    Procedure: CYSTOSCOPY RETROGRADE PYELOGRAM, CYSTOSCOPY BLADDER BIOPSY AND FULGURATION;  Surgeon: Luciana Dukes MD;  Location: [facility] MAIN OR;  Service: Urology;  Laterality: Bilateral;    ENDOSCOPY      2013- 2020    HYSTERECTOMY      partial     JOINT REPLACEMENT  2012    KNEE SURGERY Right 2012    Knee replacement- Dr. Gupta-[facility]th[facility]    LAPAROSCOPIC CHOLECYSTECTOMY  2015    MASTECTOMY Right 1996    Dr. Trent Sun         Current  "Outpatient Medications:     amLODIPine (NORVASC) 5 MG tablet, Take 1 tablet by mouth Daily With Lunch., Disp: , Rfl:     atenolol (TENORMIN) 50 MG tablet, Take 1 tablet by mouth Every Night., Disp: , Rfl:     Ergocalciferol (Vitamin D2) 50 MCG (2000 UT) tablet, Take 1 tablet by mouth Daily., Disp: , Rfl:     Loratadine (CLARITIN PO), Take 1 tablet by mouth Daily., Disp: , Rfl:     omeprazole (priLOSEC) 20 MG capsule, Take 1 capsule by mouth Every Other Day., Disp: , Rfl:     pravastatin (PRAVACHOL) 20 MG tablet, Take 1 tablet by mouth Every Night., Disp: , Rfl:     busPIRone (BUSPAR) 5 MG tablet, Take 1 tablet by mouth 3 (Three) Times a Day As Needed (anxiety)., Disp: 90 tablet, Rfl: 1     Allergies   Allergen Reactions    Contrast Dye (Echo Or Unknown Ct/Mr) Shortness Of Breath       Family History   Problem Relation Age of Onset    Hypertension Mother     Arthritis Mother     Osteoporosis Mother     Arthritis Sister     Osteoporosis Sister     Heart disease Brother     Hypertension Brother     Diabetes Brother     Malig Hyperthermia Neg Hx     Colon cancer Neg Hx         Social History     Social History Narrative    Not on file       Objective       Vital Signs:   /65 (BP Location: Left arm, Patient Position: Sitting, Cuff Size: Adult)   Pulse 64   Ht 157.5 cm (62\")   Wt 58.1 kg (128 lb)   SpO2 100%   BMI 23.41 kg/m²       Physical Exam  Constitutional:       Appearance: Normal appearance.   Pulmonary:      Effort: Pulmonary effort is normal.   Neurological:      General: No focal deficit present.      Mental Status: She is alert and oriented to person, place, and time.   Psychiatric:         Mood and Affect: Mood normal.         Behavior: Behavior normal.         Physical Exam  Vital Signs  Weight is 128 pounds.        Assessment & Plan          Assessment and Plan    Diagnoses and all orders for this visit:    1. Elevated liver enzymes (Primary)  -     CBC & Differential; Future  -     Comprehensive " Metabolic Panel; Future  -     Protime-INR; Future  -     Magnesium    2. Hepatic steatosis  -     CBC & Differential; Future  -     Comprehensive Metabolic Panel; Future  -     Protime-INR; Future  -     Magnesium    3. Anxiety    Other orders  -     busPIRone (BUSPAR) 5 MG tablet; Take 1 tablet by mouth 3 (Three) Times a Day As Needed (anxiety).  Dispense: 90 tablet; Refill: 1        Assessment & Plan  1. Fatty liver and elevated liver enzymes.  She has successfully lost 10 pounds since her last visit in September 2024, which is commendable given her current weight of 128 pounds. She is advised to maintain a daily protein intake of at least 90 grams, which can be achieved through dietary modifications or the inclusion of protein shakes. She is encouraged to continue her walking regimen for exercise. A comprehensive blood work panel has been ordered to monitor her liver enzymes and blood cell counts.    2. Anxiety.  She is advised to consider the use of magnesium supplements to aid in sleep regulation. A prescription for BuSpar 5 mg has been provided, with instructions to take it up to three times daily as needed. She is also encouraged to continue her walking regimen for exercise. 2-3 month follow up regarding anxiety.     3. Reflux.  Her reflux symptoms are well-managed at present. She is advised to avoid the use of PPIs if possible.    4. Lymphadenopathy.  She had a lymph node identified in her breast area, which was not inflamed during the last mammogram in February 2025. She is reassured that lymph nodes can become reactive and inflamed due to various conditions but typically return to normal size.Continue breast cancer surveillance.    Follow-up  The patient will follow up in 2 to 3 months.            Follow Up       Follow Up   Return in about 3 months (around 6/13/2025).  Patient was given instructions and counseling regarding her condition or for health maintenance advice. Please see specific information  pulled into the AVS if appropriate.

## 2025-03-13 ENCOUNTER — OFFICE VISIT (OUTPATIENT)
Dept: GASTROENTEROLOGY | Facility: CLINIC | Age: 80
End: 2025-03-13
Payer: MEDICARE

## 2025-03-13 ENCOUNTER — LAB (OUTPATIENT)
Dept: LAB | Facility: HOSPITAL | Age: 80
End: 2025-03-13
Payer: MEDICARE

## 2025-03-13 VITALS
DIASTOLIC BLOOD PRESSURE: 65 MMHG | HEIGHT: 62 IN | HEART RATE: 64 BPM | SYSTOLIC BLOOD PRESSURE: 156 MMHG | OXYGEN SATURATION: 100 % | WEIGHT: 128 LBS | BODY MASS INDEX: 23.55 KG/M2

## 2025-03-13 DIAGNOSIS — R74.8 ELEVATED LIVER ENZYMES: ICD-10-CM

## 2025-03-13 DIAGNOSIS — K76.0 HEPATIC STEATOSIS: ICD-10-CM

## 2025-03-13 DIAGNOSIS — F41.9 ANXIETY: ICD-10-CM

## 2025-03-13 DIAGNOSIS — R74.8 ELEVATED LIVER ENZYMES: Primary | ICD-10-CM

## 2025-03-13 LAB
ALBUMIN SERPL-MCNC: 3.8 G/DL (ref 3.5–5.2)
ALBUMIN/GLOB SERPL: 1.1 G/DL
ALP SERPL-CCNC: 100 U/L (ref 39–117)
ALT SERPL W P-5'-P-CCNC: 18 U/L (ref 1–33)
ANION GAP SERPL CALCULATED.3IONS-SCNC: 10.2 MMOL/L (ref 5–15)
AST SERPL-CCNC: 17 U/L (ref 1–32)
BASOPHILS # BLD AUTO: 0.04 10*3/MM3 (ref 0–0.2)
BASOPHILS NFR BLD AUTO: 0.6 % (ref 0–1.5)
BILIRUB SERPL-MCNC: 0.3 MG/DL (ref 0–1.2)
BUN SERPL-MCNC: 11 MG/DL (ref 8–23)
BUN/CREAT SERPL: 12.9 (ref 7–25)
CALCIUM SPEC-SCNC: 10 MG/DL (ref 8.6–10.5)
CHLORIDE SERPL-SCNC: 105 MMOL/L (ref 98–107)
CO2 SERPL-SCNC: 25.8 MMOL/L (ref 22–29)
CREAT SERPL-MCNC: 0.85 MG/DL (ref 0.57–1)
DEPRECATED RDW RBC AUTO: 40.5 FL (ref 37–54)
EGFRCR SERPLBLD CKD-EPI 2021: 69.4 ML/MIN/1.73
EOSINOPHIL # BLD AUTO: 0.26 10*3/MM3 (ref 0–0.4)
EOSINOPHIL NFR BLD AUTO: 3.8 % (ref 0.3–6.2)
ERYTHROCYTE [DISTWIDTH] IN BLOOD BY AUTOMATED COUNT: 12.1 % (ref 12.3–15.4)
GLOBULIN UR ELPH-MCNC: 3.6 GM/DL
GLUCOSE SERPL-MCNC: 100 MG/DL (ref 65–99)
HCT VFR BLD AUTO: 40.8 % (ref 34–46.6)
HGB BLD-MCNC: 13.7 G/DL (ref 12–15.9)
IMM GRANULOCYTES # BLD AUTO: 0.02 10*3/MM3 (ref 0–0.05)
IMM GRANULOCYTES NFR BLD AUTO: 0.3 % (ref 0–0.5)
INR PPP: 0.97 (ref 0.86–1.15)
LYMPHOCYTES # BLD AUTO: 1.53 10*3/MM3 (ref 0.7–3.1)
LYMPHOCYTES NFR BLD AUTO: 22.5 % (ref 19.6–45.3)
MAGNESIUM SERPL-MCNC: 2.3 MG/DL (ref 1.6–2.4)
MCH RBC QN AUTO: 30.2 PG (ref 26.6–33)
MCHC RBC AUTO-ENTMCNC: 33.6 G/DL (ref 31.5–35.7)
MCV RBC AUTO: 90.1 FL (ref 79–97)
MONOCYTES # BLD AUTO: 0.51 10*3/MM3 (ref 0.1–0.9)
MONOCYTES NFR BLD AUTO: 7.5 % (ref 5–12)
NEUTROPHILS NFR BLD AUTO: 4.43 10*3/MM3 (ref 1.7–7)
NEUTROPHILS NFR BLD AUTO: 65.3 % (ref 42.7–76)
NRBC BLD AUTO-RTO: 0 /100 WBC (ref 0–0.2)
PLATELET # BLD AUTO: 276 10*3/MM3 (ref 140–450)
PMV BLD AUTO: 10.4 FL (ref 6–12)
POTASSIUM SERPL-SCNC: 4.4 MMOL/L (ref 3.5–5.2)
PROT SERPL-MCNC: 7.4 G/DL (ref 6–8.5)
PROTHROMBIN TIME: 13.3 SECONDS (ref 11.8–14.9)
RBC # BLD AUTO: 4.53 10*6/MM3 (ref 3.77–5.28)
SODIUM SERPL-SCNC: 141 MMOL/L (ref 136–145)
WBC NRBC COR # BLD AUTO: 6.79 10*3/MM3 (ref 3.4–10.8)

## 2025-03-13 PROCEDURE — 3078F DIAST BP <80 MM HG: CPT | Performed by: NURSE PRACTITIONER

## 2025-03-13 PROCEDURE — 80053 COMPREHEN METABOLIC PANEL: CPT

## 2025-03-13 PROCEDURE — 3077F SYST BP >= 140 MM HG: CPT | Performed by: NURSE PRACTITIONER

## 2025-03-13 PROCEDURE — 1159F MED LIST DOCD IN RCRD: CPT | Performed by: NURSE PRACTITIONER

## 2025-03-13 PROCEDURE — 85025 COMPLETE CBC W/AUTO DIFF WBC: CPT

## 2025-03-13 PROCEDURE — 99214 OFFICE O/P EST MOD 30 MIN: CPT | Performed by: NURSE PRACTITIONER

## 2025-03-13 PROCEDURE — 85610 PROTHROMBIN TIME: CPT

## 2025-03-13 PROCEDURE — 1160F RVW MEDS BY RX/DR IN RCRD: CPT | Performed by: NURSE PRACTITIONER

## 2025-03-13 PROCEDURE — 83735 ASSAY OF MAGNESIUM: CPT | Performed by: NURSE PRACTITIONER

## 2025-03-13 PROCEDURE — 36415 COLL VENOUS BLD VENIPUNCTURE: CPT

## 2025-03-13 RX ORDER — BUSPIRONE HYDROCHLORIDE 5 MG/1
5 TABLET ORAL 3 TIMES DAILY PRN
Qty: 90 TABLET | Refills: 1 | Status: SHIPPED | OUTPATIENT
Start: 2025-03-13

## 2025-03-14 ENCOUNTER — RESULTS FOLLOW-UP (OUTPATIENT)
Dept: GASTROENTEROLOGY | Facility: CLINIC | Age: 80
End: 2025-03-14
Payer: MEDICARE

## 2025-03-17 NOTE — TELEPHONE ENCOUNTER
Glucose 100 when labs were drawn which is normal. Electrolytes normal liver enzymes normal kidney function normal CBC with no anemia platelets normal PT/INR normal.       Normal magnesium               Left vm to call office. luis

## 2025-05-21 ENCOUNTER — TRANSCRIBE ORDERS (OUTPATIENT)
Dept: ADMINISTRATIVE | Facility: HOSPITAL | Age: 80
End: 2025-05-21
Payer: MEDICARE

## 2025-05-21 DIAGNOSIS — Z78.0 ASYMPTOMATIC MENOPAUSAL STATE: Primary | ICD-10-CM

## 2025-05-29 ENCOUNTER — HOSPITAL ENCOUNTER (OUTPATIENT)
Dept: BONE DENSITY | Facility: HOSPITAL | Age: 80
Discharge: HOME | End: 2025-05-29
Admitting: PHYSICIAN ASSISTANT
Payer: MEDICARE

## 2025-05-29 DIAGNOSIS — Z78.0 ASYMPTOMATIC MENOPAUSAL STATE: ICD-10-CM

## 2025-05-29 PROCEDURE — 77080 DXA BONE DENSITY AXIAL: CPT

## 2025-05-31 ENCOUNTER — HOSPITAL ENCOUNTER (EMERGENCY)
Facility: HOSPITAL | Age: 80
Discharge: HOME | End: 2025-05-31
Attending: EMERGENCY MEDICINE
Payer: MEDICARE

## 2025-05-31 VITALS
BODY MASS INDEX: 22.31 KG/M2 | HEART RATE: 67 BPM | OXYGEN SATURATION: 98 % | WEIGHT: 121.25 LBS | DIASTOLIC BLOOD PRESSURE: 63 MMHG | RESPIRATION RATE: 18 BRPM | HEIGHT: 62 IN | SYSTOLIC BLOOD PRESSURE: 153 MMHG | TEMPERATURE: 97.9 F

## 2025-05-31 DIAGNOSIS — R42 DIZZINESS: Primary | ICD-10-CM

## 2025-05-31 DIAGNOSIS — M54.2 NECK PAIN ON RIGHT SIDE: ICD-10-CM

## 2025-05-31 LAB
ALBUMIN SERPL-MCNC: 4.2 G/DL (ref 3.5–5.2)
ALBUMIN/GLOB SERPL: 1.2 G/DL
ALP SERPL-CCNC: 95 U/L (ref 39–117)
ALT SERPL W P-5'-P-CCNC: 16 U/L (ref 1–33)
AMORPH URATE CRY URNS QL MICRO: ABNORMAL /HPF
ANION GAP SERPL CALCULATED.3IONS-SCNC: 14.4 MMOL/L (ref 5–15)
AST SERPL-CCNC: 20 U/L (ref 1–32)
BACTERIA UR QL AUTO: ABNORMAL /HPF
BASOPHILS # BLD AUTO: 0.06 10*3/MM3 (ref 0–0.2)
BASOPHILS NFR BLD AUTO: 0.7 % (ref 0–1.5)
BILIRUB SERPL-MCNC: 0.5 MG/DL (ref 0–1.2)
BILIRUB UR QL STRIP: NEGATIVE
BUN SERPL-MCNC: 16.8 MG/DL (ref 8–23)
BUN/CREAT SERPL: 20.7 (ref 7–25)
CALCIUM SPEC-SCNC: 9.5 MG/DL (ref 8.6–10.5)
CHLORIDE SERPL-SCNC: 102 MMOL/L (ref 98–107)
CLARITY UR: CLEAR
CO2 SERPL-SCNC: 19.6 MMOL/L (ref 22–29)
COLOR UR: YELLOW
CREAT SERPL-MCNC: 0.81 MG/DL (ref 0.57–1)
D-LACTATE SERPL-SCNC: 1.9 MMOL/L (ref 0.5–2)
D-LACTATE SERPL-SCNC: 3.2 MMOL/L (ref 0.5–2)
DEPRECATED RDW RBC AUTO: 41.1 FL (ref 37–54)
EGFRCR SERPLBLD CKD-EPI 2021: 73.5 ML/MIN/1.73
EOSINOPHIL # BLD AUTO: 0.14 10*3/MM3 (ref 0–0.4)
EOSINOPHIL NFR BLD AUTO: 1.6 % (ref 0.3–6.2)
ERYTHROCYTE [DISTWIDTH] IN BLOOD BY AUTOMATED COUNT: 12.5 % (ref 12.3–15.4)
GLOBULIN UR ELPH-MCNC: 3.4 GM/DL
GLUCOSE SERPL-MCNC: 172 MG/DL (ref 65–99)
GLUCOSE UR STRIP-MCNC: NEGATIVE MG/DL
HCT VFR BLD AUTO: 42.4 % (ref 34–46.6)
HGB BLD-MCNC: 14.7 G/DL (ref 12–15.9)
HGB UR QL STRIP.AUTO: ABNORMAL
HOLD SPECIMEN: NORMAL
HOLD SPECIMEN: NORMAL
HYALINE CASTS UR QL AUTO: ABNORMAL /LPF
IMM GRANULOCYTES # BLD AUTO: 0.04 10*3/MM3 (ref 0–0.05)
IMM GRANULOCYTES NFR BLD AUTO: 0.5 % (ref 0–0.5)
KETONES UR QL STRIP: ABNORMAL
LEUKOCYTE ESTERASE UR QL STRIP.AUTO: ABNORMAL
LIPASE SERPL-CCNC: 21 U/L (ref 13–60)
LYMPHOCYTES # BLD AUTO: 1.66 10*3/MM3 (ref 0.7–3.1)
LYMPHOCYTES NFR BLD AUTO: 19 % (ref 19.6–45.3)
MCH RBC QN AUTO: 30.8 PG (ref 26.6–33)
MCHC RBC AUTO-ENTMCNC: 34.7 G/DL (ref 31.5–35.7)
MCV RBC AUTO: 88.7 FL (ref 79–97)
MONOCYTES # BLD AUTO: 0.4 10*3/MM3 (ref 0.1–0.9)
MONOCYTES NFR BLD AUTO: 4.6 % (ref 5–12)
NEUTROPHILS NFR BLD AUTO: 6.45 10*3/MM3 (ref 1.7–7)
NEUTROPHILS NFR BLD AUTO: 73.6 % (ref 42.7–76)
NITRITE UR QL STRIP: NEGATIVE
NRBC BLD AUTO-RTO: 0 /100 WBC (ref 0–0.2)
PH UR STRIP.AUTO: 7.5 [PH] (ref 5–8)
PLAT MORPH BLD: NORMAL
PLATELET # BLD AUTO: 221 10*3/MM3 (ref 140–450)
PMV BLD AUTO: 10.6 FL (ref 6–12)
POTASSIUM SERPL-SCNC: 3.6 MMOL/L (ref 3.5–5.2)
PROT SERPL-MCNC: 7.6 G/DL (ref 6–8.5)
PROT UR QL STRIP: NEGATIVE
RBC # BLD AUTO: 4.78 10*6/MM3 (ref 3.77–5.28)
RBC # UR STRIP: ABNORMAL /HPF
RBC MORPH BLD: NORMAL
REF LAB TEST METHOD: ABNORMAL
SODIUM SERPL-SCNC: 136 MMOL/L (ref 136–145)
SP GR UR STRIP: 1.01 (ref 1–1.03)
SQUAMOUS #/AREA URNS HPF: ABNORMAL /HPF
UROBILINOGEN UR QL STRIP: ABNORMAL
WBC # UR STRIP: ABNORMAL /HPF
WBC MORPH BLD: NORMAL
WBC NRBC COR # BLD AUTO: 8.75 10*3/MM3 (ref 3.4–10.8)
WHOLE BLOOD HOLD COAG: NORMAL
WHOLE BLOOD HOLD SPECIMEN: NORMAL
YEAST URNS QL MICRO: ABNORMAL /HPF

## 2025-05-31 PROCEDURE — 85007 BL SMEAR W/DIFF WBC COUNT: CPT | Performed by: EMERGENCY MEDICINE

## 2025-05-31 PROCEDURE — 96374 THER/PROPH/DIAG INJ IV PUSH: CPT

## 2025-05-31 PROCEDURE — 99283 EMERGENCY DEPT VISIT LOW MDM: CPT

## 2025-05-31 PROCEDURE — 96375 TX/PRO/DX INJ NEW DRUG ADDON: CPT

## 2025-05-31 PROCEDURE — 83605 ASSAY OF LACTIC ACID: CPT | Performed by: EMERGENCY MEDICINE

## 2025-05-31 PROCEDURE — 25010000002 ONDANSETRON PER 1 MG: Performed by: EMERGENCY MEDICINE

## 2025-05-31 PROCEDURE — 83690 ASSAY OF LIPASE: CPT | Performed by: EMERGENCY MEDICINE

## 2025-05-31 PROCEDURE — 25810000003 LACTATED RINGERS SOLUTION: Performed by: EMERGENCY MEDICINE

## 2025-05-31 PROCEDURE — 81001 URINALYSIS AUTO W/SCOPE: CPT | Performed by: EMERGENCY MEDICINE

## 2025-05-31 PROCEDURE — 36415 COLL VENOUS BLD VENIPUNCTURE: CPT

## 2025-05-31 PROCEDURE — 25010000002 KETOROLAC TROMETHAMINE PER 15 MG: Performed by: EMERGENCY MEDICINE

## 2025-05-31 PROCEDURE — 80053 COMPREHEN METABOLIC PANEL: CPT | Performed by: EMERGENCY MEDICINE

## 2025-05-31 PROCEDURE — 85025 COMPLETE CBC W/AUTO DIFF WBC: CPT | Performed by: EMERGENCY MEDICINE

## 2025-05-31 RX ORDER — MECLIZINE HYDROCHLORIDE 25 MG/1
25 TABLET ORAL 4 TIMES DAILY PRN
Qty: 20 TABLET | Refills: 0 | Status: SHIPPED | OUTPATIENT
Start: 2025-05-31

## 2025-05-31 RX ORDER — ONDANSETRON 4 MG/1
4 TABLET, ORALLY DISINTEGRATING ORAL EVERY 6 HOURS PRN
Qty: 20 TABLET | Refills: 0 | Status: SHIPPED | OUTPATIENT
Start: 2025-05-31

## 2025-05-31 RX ORDER — MECLIZINE HYDROCHLORIDE 25 MG/1
25 TABLET ORAL ONCE
Status: COMPLETED | OUTPATIENT
Start: 2025-05-31 | End: 2025-05-31

## 2025-05-31 RX ORDER — SODIUM CHLORIDE 0.9 % (FLUSH) 0.9 %
10 SYRINGE (ML) INJECTION AS NEEDED
Status: DISCONTINUED | OUTPATIENT
Start: 2025-05-31 | End: 2025-05-31 | Stop reason: HOSPADM

## 2025-05-31 RX ORDER — MULTIVIT WITH MINERALS/LUTEIN
1 TABLET ORAL DAILY
COMMUNITY
Start: 2025-05-13

## 2025-05-31 RX ORDER — ONDANSETRON 2 MG/ML
4 INJECTION INTRAMUSCULAR; INTRAVENOUS ONCE
Status: COMPLETED | OUTPATIENT
Start: 2025-05-31 | End: 2025-05-31

## 2025-05-31 RX ORDER — KETOROLAC TROMETHAMINE 15 MG/ML
15 INJECTION, SOLUTION INTRAMUSCULAR; INTRAVENOUS ONCE
Status: COMPLETED | OUTPATIENT
Start: 2025-05-31 | End: 2025-05-31

## 2025-05-31 RX ADMIN — SODIUM CHLORIDE, POTASSIUM CHLORIDE, SODIUM LACTATE AND CALCIUM CHLORIDE 500 ML: 600; 310; 30; 20 INJECTION, SOLUTION INTRAVENOUS at 09:15

## 2025-05-31 RX ADMIN — KETOROLAC TROMETHAMINE 15 MG: 15 INJECTION, SOLUTION INTRAMUSCULAR; INTRAVENOUS at 09:15

## 2025-05-31 RX ADMIN — Medication 10 ML: at 07:58

## 2025-05-31 RX ADMIN — ONDANSETRON 4 MG: 2 INJECTION INTRAMUSCULAR; INTRAVENOUS at 07:58

## 2025-05-31 RX ADMIN — SODIUM CHLORIDE, POTASSIUM CHLORIDE, SODIUM LACTATE AND CALCIUM CHLORIDE 500 ML: 600; 310; 30; 20 INJECTION, SOLUTION INTRAVENOUS at 10:58

## 2025-05-31 RX ADMIN — MECLIZINE HYDROCHLORIDE 25 MG: 25 TABLET ORAL at 09:16

## 2025-05-31 NOTE — ED PROVIDER NOTES
Time: 8:25 AM EDT  Date of encounter:  5/31/2025  Independent Historian/Clinical History and Information was obtained by:   Patient, Family, and EMS  Chief Complaint: Dizziness and vomiting    History is limited by: N/A    History of Present Illness:  Patient is a 79 y.o. year old female who presents to the emergency department for evaluation of dizziness and vomiting.  Patient awoke this morning at approximately 6 AM.  Patient woke into severe dizziness.  Patient describes as the room is spinning.  Patient endorses subsequent nausea and vomiting to the point where she feels she is just now dry heaving.  Patient also states that she is extremely weak and has some trouble walking this morning because she was so dizzy.  Patient reports her symptoms are exacerbated by any kind of movement.  Patient denies a headache but she does have some neck discomfort.  Patient reports that she has a history of osteoarthritis of her neck.  Patient denies any chest pain, shortness of breath, or abdominal pain.  Patient also reports she had a similar episode approximately a year ago that lasted for a day.    Patient Care Team  Primary Care Provider: Claudia Patel PA-C    Past Medical History:     Allergies   Allergen Reactions    Contrast Dye (Echo Or Unknown Ct/Mr) Shortness Of Breath     Past Medical History:   Diagnosis Date    Bell's palsy     2008 RIGHT SIDE OF FACE    Bloating 06/17/2016    Essential hypertension 03/17/2017    Generalized anxiety disorder 03/04/2019    Hematuria 10/30/2017    2024    Herpes simplex 04/16/2018    Hip arthrosis 2022    History of right breast cancer 02/08/2022    Hyperlipemia 06/17/2016    Neck strain 2022    Neoplasm of breast, female, malignant 1996    right breast NO CP OR NS R ARM    Osteoarthritis, multiple sites 1994    throughout whole body    Periarthritis of shoulder 2022    Polyarthritis of multiple sites 07/13/2017    PONV (postoperative nausea and vomiting)     Rotator cuff syndrome  2018    Subclinical hypothyroidism 06/17/2016    Tinnitus 07/13/2017    Vertigo 07/13/2017     Past Surgical History:   Procedure Laterality Date    ANKLE SURGERY Left 1995    Lt ankle d/t broken ankle injury    BREAST SURGERY Right     REVISION OF INCISION/SCAR    CATARACT EXTRACTION Bilateral 2008    with lens implant    COLONOSCOPY      2013- 2016- [id number]    CYSTOSCOPY RETROGRADE PYELOGRAM Bilateral 03/14/2024    Procedure: CYSTOSCOPY RETROGRADE PYELOGRAM, CYSTOSCOPY BLADDER BIOPSY AND FULGURATION;  Surgeon: Luciana Dukes MD;  Location: [facility] MAIN OR;  Service: Urology;  Laterality: Bilateral;    ENDOSCOPY      2013- 2020    HYSTERECTOMY      partial     JOINT REPLACEMENT  2012    KNEE SURGERY Right 2012    Knee replacement- Dr. Gupta-Adenike    LAPAROSCOPIC CHOLECYSTECTOMY  2015    MASTECTOMY Right 1996    Dr. Trent Sun     Family History   Problem Relation Age of Onset    Hypertension Mother     Arthritis Mother     Osteoporosis Mother     Arthritis Sister     Osteoporosis Sister     Heart disease Brother     Hypertension Brother     Diabetes Brother     Malig Hyperthermia Neg Hx     Colon cancer Neg Hx        Home Medications:  Prior to Admission medications    Medication Sig Start Date End Date Taking? Authorizing Provider   ascorbic acid (VITAMIN C) 1000 MG tablet Take 1 tablet by mouth Daily. 5/13/25  Yes Be Peña MD   amLODIPine (NORVASC) 5 MG tablet Take 1 tablet by mouth Daily With Lunch. 1/31/23   Be Peña MD   atenolol (TENORMIN) 50 MG tablet Take 1 tablet by mouth Every Night. 7/19/21   Emergency, Nurse Taty, RN   busPIRone (BUSPAR) 5 MG tablet Take 1 tablet by mouth 3 (Three) Times a Day As Needed (anxiety). 3/13/25   Maci Wilkins APRN   Ergocalciferol (Vitamin D2) 50 MCG (2000 UT) tablet Take 1 tablet by mouth Daily.    Be Peña MD   Loratadine (CLARITIN PO) Take 1 tablet by mouth Daily.    Be Peña MD   omeprazole (priLOSEC) 20 MG capsule  "Take 1 capsule by mouth Every Other Day.    Provider, MD Be   pravastatin (PRAVACHOL) 20 MG tablet Take 1 tablet by mouth Every Night. 8/7/21   Emergency, Nurse Taty, RN        Social History:   Social History     Tobacco Use    Smoking status: Never     Passive exposure: Past    Smokeless tobacco: Never   Vaping Use    Vaping status: Never Used   Substance Use Topics    Alcohol use: Never     Comment: does not drink 3/4/2019- 1/29/2019- 1/21/2019- 10/16/2018    Drug use: Never         Review of Systems:  Review of Systems   Constitutional:  Negative for chills and fever.   HENT:  Negative for congestion, ear pain and sore throat.    Eyes:  Negative for pain.   Respiratory:  Negative for cough, chest tightness and shortness of breath.    Cardiovascular:  Negative for chest pain.   Gastrointestinal:  Positive for nausea and vomiting. Negative for abdominal pain and diarrhea.   Genitourinary:  Negative for flank pain and hematuria.   Musculoskeletal:  Positive for neck pain. Negative for joint swelling.   Skin:  Negative for pallor.   Neurological:  Positive for dizziness. Negative for seizures and headaches.   All other systems reviewed and are negative.       Physical Exam:  /63 (BP Location: Left arm, Patient Position: Lying)   Pulse 67   Temp 97.9 °F (36.6 °C) (Oral)   Resp 18   Ht 157.5 cm (62\")   Wt 55 kg (121 lb 4.1 oz)   SpO2 98%   BMI 22.18 kg/m²     Physical Exam    Vital signs were reviewed under triage note.  General appearance - Patient appears well-developed and well-nourished.  Patient is in no acute distress.  Head - Normocephalic, atraumatic.  Pupils - Equal, round, reactive to light.  Extraocular muscles are intact.  Conjunctiva is clear.  Nasal - Normal inspection.  No evidence of trauma or epistaxis.  Tympanic membranes - Gray, intact without erythema or retractions.  Oral mucosa - Pink and moist without lesions or erythema.  Uvula is midline.  Chest wall - Atraumatic.  Chest " wall is nontender.  There are no vesicular rashes noted.  Neck - Supple.  Trachea was midline.  There is no palpable lymphadenopathy or thyromegaly.  There are no meningeal signs  Lungs - Clear to auscultation and percussion bilaterally.  Heart - Regular rate and rhythm without any murmurs, clicks, or gallops.  Abdomen - Soft.  Bowel sounds are present.  There is no palpable tenderness.  There is no rebound, guarding, or rigidity.  There are no palpable masses.  There are no pulsatile masses.  Back - Spine is straight and midline.  There is no CVA tenderness.  Extremities - Intact x4 with full range of motion.  There is no palpable edema.  Pulses are intact x4 and equal.  Neurologic - Patient is awake, alert, and oriented x3.  Cranial nerves II through XII are grossly intact.  The patient's symptoms were exacerbated by extraocular muscle testing as well as movement in bed.  Motor and sensory functions grossly intact.  Cerebellar function was normal.  Integument - There are no rashes.  There are no petechia or purpura lesions noted.  There are no vesicular lesions noted.           Procedures:  Procedures      Medical Decision Making:      Comorbidities that affect care:    Hypertension, breast cancer, generalized anxiety disorder, hyperlipidemia, osteoarthritis, vertigo    External Notes reviewed:    Previous Clinic Note: Office visit with JAREN Bowden on 3/13/2025 was reviewed by me.      The following orders were placed and all results were independently analyzed by me:  Orders Placed This Encounter   Procedures    [place] Draw    Comprehensive Metabolic Panel    Lipase    Urinalysis With Microscopic If Indicated (No Culture) - Urine, Clean Catch    Lactic Acid, Plasma    CBC Auto Differential    Scan Slide    STAT Lactic Acid, Reflex    Urinalysis, Microscopic Only - Urine, Clean Catch    NPO Diet NPO Type: Strict NPO    Undress & Gown    Insert Peripheral IV    CBC & Differential    Green Top (Gel)     Lavender Top    Gold Top - SST    Light Blue Top       Medications Given in the Emergency Department:  Medications   sodium chloride 0.9 % flush 10 mL (10 mL Intravenous Given 5/31/25 0758)   ondansetron (ZOFRAN) injection 4 mg (4 mg Intravenous Given 5/31/25 0758)   ketorolac (TORADOL) injection 15 mg (15 mg Intravenous Given 5/31/25 0915)   meclizine (ANTIVERT) tablet 25 mg (25 mg Oral Given 5/31/25 0916)   lactated ringers bolus 500 mL (0 mL Intravenous Stopped 5/31/25 0956)   lactated ringers bolus 500 mL (500 mL Intravenous New Bag 5/31/25 1058)        ED Course:     The patient was seen and evaluated in the ED by me.  The above history and physical examination was performed as documented.  Diagnostic data was obtained.  Results reviewed.  Upon repeat evaluation the patient's dizziness has resolved.  Patient is able to sit up and move around bed without being dizzy or nauseous.  Patient also reports improvement of her neck pain.  Chemistries were unremarkable.  Patient's lactic acid resolved after IV fluids and no further vomiting.  Patient be discharged home with outpatient symptomatic treatment and follow-up.  Patient is advised return to the ER for any recurrent or worsening symptoms.    Labs:    Lab Results (last 24 hours)       Procedure Component Value Units Date/Time    CBC & Differential [[id number]]  (Abnormal) Collected: 05/31/25 0815    Specimen: Blood Updated: 05/31/25 0851    Narrative:      The following orders were created for panel order CBC & Differential.  Procedure                               Abnormality         Status                     ---------                               -----------         ------                     CBC Auto Differential[[id number]]        Abnormal            Final result               Scan Slide[[id number]]                   Normal              Final result                 Please view results for these tests on the individual orders.    Comprehensive Metabolic Panel  [[id number]]  (Abnormal) Collected: 05/31/25 0815    Specimen: Blood Updated: 05/31/25 0839     Glucose 172 mg/dL      BUN 16.8 mg/dL      Creatinine 0.81 mg/dL      Sodium 136 mmol/L      Potassium 3.6 mmol/L      Comment: Slight hemolysis detected by analyzer. Result may be falsely elevated.        Chloride 102 mmol/L      CO2 19.6 mmol/L      Calcium 9.5 mg/dL      Total Protein 7.6 g/dL      Albumin 4.2 g/dL      ALT (SGPT) 16 U/L      AST (SGOT) 20 U/L      Alkaline Phosphatase 95 U/L      Total Bilirubin 0.5 mg/dL      Globulin 3.4 gm/dL      A/G Ratio 1.2 g/dL      BUN/Creatinine Ratio 20.7     Anion Gap 14.4 mmol/L      eGFR 73.5 mL/min/1.73     Narrative:      GFR Categories in Chronic Kidney Disease (CKD)              GFR Category          GFR (mL/min/1.73)    Interpretation  G1                    90 or greater        Normal or high (1)  G2                    60-89                Mild decrease (1)  G3a                   45-59                Mild to moderate decrease  G3b                   30-44                Moderate to severe decrease  G4                    15-29                Severe decrease  G5                    14 or less           Kidney failure    (1)In the absence of evidence of kidney disease, neither GFR category G1 or G2 fulfill the criteria for CKD.    eGFR calculation 2021 CKD-EPI creatinine equation, which does not include race as a factor    Lipase [[id number]]  (Normal) Collected: 05/31/25 0815    Specimen: Blood Updated: 05/31/25 0839     Lipase 21 U/L     Lactic Acid, Plasma [[id number]]  (Abnormal) Collected: 05/31/25 0815    Specimen: Blood Updated: 05/31/25 0852     Lactate 3.2 mmol/L     CBC Auto Differential [[id number]]  (Abnormal) Collected: 05/31/25 0815    Specimen: Blood Updated: 05/31/25 0851     WBC 8.75 10*3/mm3      RBC 4.78 10*6/mm3      Hemoglobin 14.7 g/dL      Hematocrit 42.4 %      MCV 88.7 fL      MCH 30.8 pg      MCHC 34.7 g/dL      RDW 12.5 %      RDW-SD 41.1 fl       MPV 10.6 fL      Platelets 221 10*3/mm3      Neutrophil % 73.6 %      Lymphocyte % 19.0 %      Monocyte % 4.6 %      Eosinophil % 1.6 %      Basophil % 0.7 %      Immature Grans % 0.5 %      Neutrophils, Absolute 6.45 10*3/mm3      Lymphocytes, Absolute 1.66 10*3/mm3      Monocytes, Absolute 0.40 10*3/mm3      Eosinophils, Absolute 0.14 10*3/mm3      Basophils, Absolute 0.06 10*3/mm3      Immature Grans, Absolute 0.04 10*3/mm3      nRBC 0.0 /100 WBC     Scan Slide [[id number]]  (Normal) Collected: 05/31/25 0815    Specimen: Blood Updated: 05/31/25 0851     RBC Morphology Normal     WBC Morphology Normal     Platelet Morphology Normal    Urinalysis With Microscopic If Indicated (No Culture) - Urine, Clean Catch [[id number]]  (Abnormal) Collected: 05/31/25 0933    Specimen: Urine, Clean Catch Updated: 05/31/25 0959     Color, UA Yellow     Appearance, UA Clear     pH, UA 7.5     Specific Gravity, UA 1.013     Glucose, UA Negative     Ketones, UA 15 mg/dL (1+)     Bilirubin, UA Negative     Blood, UA Small (1+)     Protein, UA Negative     Leuk Esterase, UA Trace     Nitrite, UA Negative     Urobilinogen, UA 0.2 E.U./dL    Urinalysis, Microscopic Only - Urine, Clean Catch [[id number]]  (Abnormal) Collected: 05/31/25 0933    Specimen: Urine, Clean Catch Updated: 05/31/25 0959     RBC, UA 0-2 /HPF      WBC, UA 0-2 /HPF      Bacteria, UA Trace /HPF      Squamous Epithelial Cells, UA 0-2 /HPF      Yeast, UA Small/1+ Yeast /HPF      Hyaline Casts, UA None Seen /LPF      Amorphous Crystals, UA Small/1+ /HPF      Methodology Manual Light Microscopy    STAT Lactic Acid, Reflex [[id number]]  (Normal) Collected: 05/31/25 1157    Specimen: Blood Updated: 05/31/25 1228     Lactate 1.9 mmol/L              Imaging:    No Radiology Exams Resulted Within Past 24 Hours      Differential Diagnosis and Discussion:    Dizziness: Based on the patient's history, signs, and symptoms, the diffential diagnosis includes but is not limited to  meningitis, stroke, sepsis, subarachnoid hemorrhage, intracranial bleeding, encephalitis, vertigo, electrolyte imbalance, and metabolic disorders.    Labs were collected in the emergency department and all labs were reviewed and interpreted by me.    MDM     Amount and/or Complexity of Data Reviewed  Decide to obtain previous medical records or to obtain history from someone other than the patient: yes             Patient Care Considerations:    CT HEAD: I considered ordering a noncontrast CT of the head, however the patient has no focal neurological deficits identified.      Consultants/Shared Management Plan:    None    Social Determinants of Health:    Patient has presented with family members who are responsible, reliable and will ensure follow up care.      Disposition and Care Coordination:    Discharged: I considered escalation of care by admitting this patient to the hospital, however there were no acute focal findings to warrant inpatient admission.    I have explained the patient´s condition, diagnoses and treatment plan based on the information available to me at this time. I have answered questions and addressed any concerns. The patient has a good  understanding of the patient´s diagnosis, condition, and treatment plan as can be expected at this point. The vital signs have been stable. The patient´s condition is stable and appropriate for discharge from the emergency department.      The patient will pursue further outpatient evaluation with the primary care physician or other designated or consulting physician as outlined in the discharge instructions. They are agreeable to this plan of care and follow-up instructions have been explained in detail. The patient has received these instructions in written format and has expressed an understanding of the discharge instructions. The patient is aware that any significant change in condition or worsening of symptoms should prompt an immediate return to this  or the closest emergency department or call to 911.  I have explained discharge medications and the need for follow up with the patient/caretakers. This was also printed in the discharge instructions. Patient was discharged with the following medications and follow up:      Medication List        New Prescriptions      Diclofenac Sodium 1 % gel gel  Commonly known as: VOLTAREN  Apply 4 g topically to the appropriate area as directed 3 (Three) Times a Day As Needed (Pain).     meclizine 25 MG tablet  Commonly known as: ANTIVERT  Take 1 tablet by mouth 4 (Four) Times a Day As Needed for Dizziness.     ondansetron ODT 4 MG disintegrating tablet  Commonly known as: ZOFRAN-ODT  Take 1 tablet by mouth Every 6 (Six) Hours As Needed for Nausea or Vomiting.               Where to Get Your Medications        These medications were sent to [facility] [address] [place] [address] [phone]  - [phone] [address] [address]      Phone: [phone]   Diclofenac Sodium 1 % gel gel  meclizine 25 MG tablet  ondansetron ODT 4 MG disintegrating tablet      Claudia Patel, GLEN  [address] [facility] [id number] [phone]    In 1 week        Final diagnoses:   Dizziness   Neck pain on right side        ED Disposition       ED Disposition   Discharge    Condition   Stable    Comment   --               This medical record created using voice recognition software.             Ye Quinones DO  05/31/25 2020

## 2025-05-31 NOTE — DISCHARGE INSTRUCTIONS
Activity as tolerated.  Drink plenty of fluids.  Take the prescriptions as prescribed as needed.  Follow with your primary care provider in 1 week.  Return to the ER for uncontrollable dizziness, nonstop vomiting, or any other concerns issues that may arise.

## 2025-06-19 ENCOUNTER — OFFICE VISIT (OUTPATIENT)
Dept: GASTROENTEROLOGY | Facility: CLINIC | Age: 80
End: 2025-06-19
Payer: MEDICARE

## 2025-06-19 VITALS
DIASTOLIC BLOOD PRESSURE: 56 MMHG | SYSTOLIC BLOOD PRESSURE: 144 MMHG | HEIGHT: 62 IN | WEIGHT: 130.6 LBS | HEART RATE: 66 BPM | BODY MASS INDEX: 24.03 KG/M2

## 2025-06-19 DIAGNOSIS — K21.9 GASTROESOPHAGEAL REFLUX DISEASE, UNSPECIFIED WHETHER ESOPHAGITIS PRESENT: ICD-10-CM

## 2025-06-19 DIAGNOSIS — R74.8 ELEVATED LIVER ENZYMES: ICD-10-CM

## 2025-06-19 DIAGNOSIS — K76.0 FATTY LIVER: Primary | ICD-10-CM

## 2025-06-19 DIAGNOSIS — Z86.0100 HISTORY OF COLON POLYPS: ICD-10-CM

## 2025-06-19 PROCEDURE — 3078F DIAST BP <80 MM HG: CPT | Performed by: NURSE PRACTITIONER

## 2025-06-19 PROCEDURE — 3077F SYST BP >= 140 MM HG: CPT | Performed by: NURSE PRACTITIONER

## 2025-06-19 PROCEDURE — 1159F MED LIST DOCD IN RCRD: CPT | Performed by: NURSE PRACTITIONER

## 2025-06-19 PROCEDURE — 1160F RVW MEDS BY RX/DR IN RCRD: CPT | Performed by: NURSE PRACTITIONER

## 2025-06-19 PROCEDURE — 99214 OFFICE O/P EST MOD 30 MIN: CPT | Performed by: NURSE PRACTITIONER

## 2025-06-19 RX ORDER — FERROUS SULFATE 325(65) MG
325 TABLET ORAL
COMMUNITY

## 2025-06-19 NOTE — PROGRESS NOTES
Chief Complaint   Fatty Liver  and Elevated Hepatic Enzymes    History of Present Illness       Alina Velasquez is a 80 y.o. female who presents to [facility] for follow-up         History of Present Illness  The patient is an 80-year-old female who presents today for an office follow-up.  She is new to me today.  She has a history of fatty liver disease and was last seen in the office by nurse practitioner Maci Wilkins on 03/13/2025. During her last visit, she reported changing her diet and losing 10 pounds since her previous visit in September. She was feeling overall much better and was trying to reduce her carbohydrate intake. Additionally, she has been working on her exercise routine.    She has a history of anxiety and uses clonazepam as needed. She also has a history of GERD and usually manages it well with Prilosec, though she now takes it only as needed and uses Gaviscon as needed. She saw her oncologist and had a mammogram in February 2024, which showed something suspicious. A subsequent ultrasound revealed it was a lymph node. She repeated the mammogram and ultrasound in February 2025, and according to Dr. Frost, everything was normal. She follows up with Dr. Frost regularly. The most recent liver ultrasound was done in October 2024, showing hepatic steatosis, cholecystectomy, and right renal cortical thinning with no hydronephrosis. Her last colonoscopy was performed by Dr. Peralta on 02/08/2021, showing diverticulosis with some colon polyps and hemorrhoids. A recall colonoscopy is scheduled for 2026. Most recent liver enzymes were checked on 05/31/2025, and the LFTs were normal.    She reports regular bowel movements and is currently on iron supplementation due to low iron levels detected a month ago. She has discontinued the use of Prilosec, opting instead for a teaspoon of Gaviscon at night if necessary.    PAST SURGICAL HISTORY:  Cholecystectomy    SOCIAL  HISTORY  Diet: The patient has been trying to reduce her carb intake.    FAMILY HISTORY  - Negative for family history of colon cancer, rectal cancer, stomach cancer, throat cancer, liver cancer, and pancreatic cancer.        Results       Result Review :       CMP          3/13/2025    14:03 5/31/2025    08:15   CMP   Glucose 100  172    BUN 11  16.8    Creatinine 0.85  0.81    EGFR 69.4  73.5    Sodium 141  136    Potassium 4.4  3.6    Chloride 105  102    Calcium 10.0  9.5    Total Protein 7.4  7.6    Albumin 3.8  4.2    Globulin 3.6  3.4    Total Bilirubin 0.3  0.5    Alkaline Phosphatase 100  95    AST (SGOT) 17  20    ALT (SGPT) 18  16    Albumin/Globulin Ratio 1.1  1.2    BUN/Creatinine Ratio 12.9  20.7    Anion Gap 10.2  14.4      CBC          3/13/2025    14:03 5/31/2025    08:15   CBC   WBC 6.79  8.75    RBC 4.53  4.78    Hemoglobin 13.7  14.7    Hematocrit 40.8  42.4    MCV 90.1  88.7    MCH 30.2  30.8    MCHC 33.6  34.7    RDW 12.1  12.5    Platelets 276  221      CBC w/diff          3/13/2025    14:03 5/31/2025    08:15   CBC w/Diff   WBC 6.79  8.75    RBC 4.53  4.78    Hemoglobin 13.7  14.7    Hematocrit 40.8  42.4    MCV 90.1  88.7    MCH 30.2  30.8    MCHC 33.6  34.7    RDW 12.1  12.5    Platelets 276  221    Neutrophil Rel % 65.3  73.6    Immature Granulocyte Rel % 0.3  0.5    Lymphocyte Rel % 22.5  19.0    Monocyte Rel % 7.5  4.6    Eosinophil Rel % 3.8  1.6    Basophil Rel % 0.6  0.7          Electrolytes          3/13/2025    14:03 5/31/2025    08:15   Electrolytes   Sodium 141  136    Potassium 4.4  3.6    Chloride 105  102    Calcium 10.0  9.5      Renal Profile          3/13/2025    14:03 5/31/2025    08:15   Renal Profile   BUN 11  16.8    Creatinine 0.85  0.81        Lipase   Lipase   Date Value Ref Range Status   05/31/2025 21 13 - 60 U/L Final     Amylase   Amylase   Date Value Ref Range Status   06/03/2022 38 28 - 100 U/L Final     Iron Profile   Iron   Date Value Ref Range Status  "  04/13/2024 102 37 - 145 mcg/dL Final     TIBC   Date Value Ref Range Status   04/13/2024 356 298 - 536 mcg/dL Final     Iron Saturation (TSAT)   Date Value Ref Range Status   04/13/2024 29 20 - 50 % Final     Transferrin   Date Value Ref Range Status   04/13/2024 239 200 - 360 mg/dL Final     Ferritin   Ferritin   Date Value Ref Range Status   04/13/2024 103.90 13.00 - 150.00 ng/mL Final     ESR (Sed Rate) No results found for: \"SEDRATE\"  CRP (C-Reactive) No results found for: \"CRP\"  Liver Workup   Ferritin   Date Value Ref Range Status   04/13/2024 103.90 13.00 - 150.00 ng/mL Final     Immunofixation Result, Serum   Date Value Ref Range Status   04/13/2024 Comment  Final     Comment:     No monoclonality detected.     IgG   Date Value Ref Range Status   04/13/2024 852 586 - 1602 mg/dL Final     IgA   Date Value Ref Range Status   04/13/2024 351 64 - 422 mg/dL Final     IgM   Date Value Ref Range Status   04/13/2024 92 26 - 217 mg/dL Final     Iron   Date Value Ref Range Status   04/13/2024 102 37 - 145 mcg/dL Final     TIBC   Date Value Ref Range Status   04/13/2024 356 298 - 536 mcg/dL Final     Iron Saturation (TSAT)   Date Value Ref Range Status   04/13/2024 29 20 - 50 % Final     Transferrin   Date Value Ref Range Status   04/13/2024 239 200 - 360 mg/dL Final     Protime   Date Value Ref Range Status   03/13/2025 13.3 11.8 - 14.9 Seconds Final     INR   Date Value Ref Range Status   03/13/2025 0.97 0.86 - 1.15 Final     Acute HEP Panel No results found for: \"HEPBSAG\", \"HEPAIGM\", \"HEPBIGMCORE\", \"HEPCVIRUSABY\"            Results  Labs   - Liver enzymes (ALT, AST, alk phos, and total bilirubin): 05/31/2025, Normal    Imaging   - Liver ultrasound: 10/2024, Hepatic steatosis, cholecystectomy, and right renal cortical thinning. No hydronephrosis.      Past Medical History       Past Medical History:   Diagnosis Date    Bell's palsy     2008 RIGHT SIDE OF FACE    Bloating 06/17/2016    Essential hypertension " 03/17/2017    Generalized anxiety disorder 03/04/2019    Hematuria 10/30/2017    2024    Herpes simplex 04/16/2018    Hip arthrosis 2022    History of right breast cancer 02/08/2022    Hyperlipemia 06/17/2016    Neck strain 2022    Neoplasm of breast, female, malignant 1996    right breast NO CP OR NS R ARM    Osteoarthritis, multiple sites 1994    throughout whole body    Periarthritis of shoulder 2022    Polyarthritis of multiple sites 07/13/2017    PONV (postoperative nausea and vomiting)     Rotator cuff syndrome 2018    Subclinical hypothyroidism 06/17/2016    Tinnitus 07/13/2017    Vertigo 07/13/2017       Past Surgical History:   Procedure Laterality Date    ANKLE SURGERY Left 1995    Lt ankle d/t broken ankle injury    BREAST SURGERY Right     REVISION OF INCISION/SCAR    CATARACT EXTRACTION Bilateral 2008    with lens implant    COLONOSCOPY      2013- 2016- [id number]    CYSTOSCOPY RETROGRADE PYELOGRAM Bilateral 03/14/2024    Procedure: CYSTOSCOPY RETROGRADE PYELOGRAM, CYSTOSCOPY BLADDER BIOPSY AND FULGURATION;  Surgeon: Luciana Dukes MD;  Location: [facility] MAIN OR;  Service: Urology;  Laterality: Bilateral;    ENDOSCOPY      2013- 2020    HYSTERECTOMY      partial     JOINT REPLACEMENT  2012    KNEE SURGERY Right 2012    Knee replacement- Dr. Gupta-Orth[facility]    LAPAROSCOPIC CHOLECYSTECTOMY  2015    MASTECTOMY Right 1996    Dr. Trent Sun         Current Outpatient Medications:     amLODIPine (NORVASC) 5 MG tablet, Take 1 tablet by mouth Daily With Lunch., Disp: , Rfl:     ascorbic acid (VITAMIN C) 1000 MG tablet, Take 1 tablet by mouth Daily., Disp: , Rfl:     atenolol (TENORMIN) 50 MG tablet, Take 1 tablet by mouth Every Night., Disp: , Rfl:     busPIRone (BUSPAR) 5 MG tablet, Take 1 tablet by mouth 3 (Three) Times a Day As Needed (anxiety)., Disp: 90 tablet, Rfl: 1    Diclofenac Sodium (VOLTAREN) 1 % gel gel, Apply 4 g topically to the appropriate area as directed 3 (Three) Times a Day As Needed (Pain).,  "Disp: 50 g, Rfl: 0    Ergocalciferol (Vitamin D2) 50 MCG (2000 UT) tablet, Take 1 tablet by mouth Daily., Disp: , Rfl:     ferrous sulfate 325 (65 FE) MG tablet, Take 1 tablet by mouth Daily With Breakfast., Disp: , Rfl:     Loratadine (CLARITIN PO), Take 1 tablet by mouth Daily., Disp: , Rfl:     meclizine (ANTIVERT) 25 MG tablet, Take 1 tablet by mouth 4 (Four) Times a Day As Needed for Dizziness., Disp: 20 tablet, Rfl: 0    ondansetron ODT (ZOFRAN-ODT) 4 MG disintegrating tablet, Take 1 tablet by mouth Every 6 (Six) Hours As Needed for Nausea or Vomiting., Disp: 20 tablet, Rfl: 0    pravastatin (PRAVACHOL) 20 MG tablet, Take 1 tablet by mouth Every Night., Disp: , Rfl:      Allergies   Allergen Reactions    Contrast Dye (Echo Or Unknown Ct/Mr) Shortness Of Breath       Family History   Problem Relation Age of Onset    Hypertension Mother     Arthritis Mother     Osteoporosis Mother     Arthritis Sister     Osteoporosis Sister     Heart disease Brother     Hypertension Brother     Diabetes Brother     Malig Hyperthermia Neg Hx     Colon cancer Neg Hx         Social History     Social History Narrative    Not on file       Objective       Review of Systems   Constitutional:  Negative for appetite change, fatigue, fever, unexpected weight gain and unexpected weight loss.   HENT:  Negative for trouble swallowing.    Respiratory:  Negative for cough, choking, chest tightness, shortness of breath, wheezing and stridor.    Cardiovascular:  Negative for chest pain, palpitations and leg swelling.   Gastrointestinal:  Negative for abdominal distention, abdominal pain, anal bleeding, blood in stool, constipation, diarrhea, nausea, rectal pain, vomiting, GERD and indigestion.        Vital Signs:   /56 (BP Location: Left arm, Patient Position: Sitting, Cuff Size: Adult)   Pulse 66   Ht 157.5 cm (62\")   Wt 59.2 kg (130 lb 9.6 oz)   BMI 23.89 kg/m²       Physical Exam  Constitutional:       General: She is not in " acute distress.     Appearance: She is well-developed. She is not ill-appearing.   HENT:      Head: Normocephalic.   Eyes:      Pupils: Pupils are equal, round, and reactive to light.   Cardiovascular:      Rate and Rhythm: Normal rate and regular rhythm.      Heart sounds: Normal heart sounds.   Pulmonary:      Effort: Pulmonary effort is normal.      Breath sounds: Normal breath sounds.   Abdominal:      General: Bowel sounds are normal. There is no distension.      Palpations: Abdomen is soft. There is no mass.      Tenderness: There is no abdominal tenderness. There is no guarding or rebound.      Hernia: No hernia is present.   Musculoskeletal:         General: Normal range of motion.   Skin:     General: Skin is warm and dry.   Neurological:      Mental Status: She is alert and oriented to person, place, and time.   Psychiatric:         Speech: Speech normal.         Behavior: Behavior normal.         Judgment: Judgment normal.         Physical Exam          Assessment & Plan          Assessment and Plan    Diagnoses and all orders for this visit:    1. Fatty liver (Primary)  -     US Liver; Future    2. Elevated liver enzymes    3. Gastroesophageal reflux disease, unspecified whether esophagitis present    4. History of colon polyps        Assessment & Plan  1. Fatty liver disease:  - Liver enzymes (ALT, AST, alkaline phosphatase, and total bilirubin) were within normal limits as of 05/31/2025.  - Most recent liver ultrasound in 10/2024 showed hepatic steatosis, cholecystectomy, and right renal cortical thinning with no hydronephrosis.  - Order liver ultrasound to monitor for any new lesions due to increased risk of liver cancer.  - Hospital will contact to schedule the procedure.    2. Gastroesophageal reflux disease (GERD):  - No longer takes Prilosec (omeprazole) regularly.  - Uses Gaviscon as needed for occasional heartburn, primarily at night.  - Continue using Gaviscon as long as it remains  effective.    3. Anxiety:  - Uses clonazepam as needed.    4. Iron deficiency:  - Started on iron supplements about a month ago due to low iron levels.  - Reports no issues with the supplementation.    5. Health Maintenance:  - Up to date with colonoscopies; last performed on 02/08/2021, showing diverticulosis, colon polyps, and hemorrhoids.  - Recall colonoscopy suggested for 2026, to be discussed given age and current health status.    Follow-up: Follow up in 6 months.          Follow Up       Follow Up   Return in about 6 months (around 12/19/2025) for FATTY LIVER, ELEVATED LIVER ENZYMES, GERD.  Patient was given instructions and counseling regarding her condition or for health maintenance advice. Please see specific information pulled into the AVS if appropriate.       Patient or patient representative verbalized consent for the use of Ambient Listening during the visit with  JAREN Ramirez for chart documentation. 6/19/2025  14:17 EDT

## 2025-07-15 ENCOUNTER — HOSPITAL ENCOUNTER (OUTPATIENT)
Dept: ULTRASOUND IMAGING | Facility: HOSPITAL | Age: 80
Discharge: HOME | End: 2025-07-15
Admitting: NURSE PRACTITIONER
Payer: MEDICARE

## 2025-07-15 DIAGNOSIS — K76.0 FATTY LIVER: ICD-10-CM

## 2025-07-15 PROCEDURE — 76705 ECHO EXAM OF ABDOMEN: CPT

## (undated) DEVICE — MEDICINE CUP, GRADUATED, STER: Brand: MEDLINE

## (undated) DEVICE — CYSTO/BLADDER IRRIGATION SET, REGULATING CLAMP

## (undated) DEVICE — SKIN PREP TRAY W/CHG: Brand: MEDLINE INDUSTRIES, INC.

## (undated) DEVICE — CYSTO PACK: Brand: MEDLINE INDUSTRIES, INC.

## (undated) DEVICE — GOWN,REINFORCE,POLY,SIRUS,BREATH SLV,XLG: Brand: MEDLINE

## (undated) DEVICE — SOL IRRG H2O BG 3000ML STRL

## (undated) DEVICE — TOWEL,OR,DSP,ST,BLUE,STD,4/PK,20PK/CS: Brand: MEDLINE

## (undated) DEVICE — DRSNG TELFA PAD NONADH STR 1S 3X4IN

## (undated) DEVICE — GLOVE,SURG,SENSICARE SLT,LF,PF,6.5: Brand: MEDLINE

## (undated) DEVICE — CATH URETRL PA CONE TP 8F

## (undated) DEVICE — Device